# Patient Record
Sex: FEMALE | Race: WHITE | NOT HISPANIC OR LATINO | Employment: OTHER | URBAN - METROPOLITAN AREA
[De-identification: names, ages, dates, MRNs, and addresses within clinical notes are randomized per-mention and may not be internally consistent; named-entity substitution may affect disease eponyms.]

---

## 2017-06-23 ENCOUNTER — OFFICE VISIT (OUTPATIENT)
Dept: UROGYNECOLOGY | Facility: CLINIC | Age: 72
End: 2017-06-23
Payer: MEDICARE

## 2017-06-23 VITALS
HEIGHT: 66 IN | BODY MASS INDEX: 31.5 KG/M2 | WEIGHT: 196 LBS | HEART RATE: 74 BPM | TEMPERATURE: 99 F | DIASTOLIC BLOOD PRESSURE: 79 MMHG | SYSTOLIC BLOOD PRESSURE: 129 MMHG

## 2017-06-23 DIAGNOSIS — N99.3 VAGINAL VAULT PROLAPSE, POSTHYSTERECTOMY: ICD-10-CM

## 2017-06-23 DIAGNOSIS — N81.10 PROLAPSE OF ANTERIOR VAGINAL WALL: Primary | ICD-10-CM

## 2017-06-23 PROCEDURE — 99203 OFFICE O/P NEW LOW 30 MIN: CPT | Mod: PBBFAC,PO | Performed by: OBSTETRICS & GYNECOLOGY

## 2017-06-23 PROCEDURE — 99204 OFFICE O/P NEW MOD 45 MIN: CPT | Mod: S$PBB,,, | Performed by: OBSTETRICS & GYNECOLOGY

## 2017-06-23 PROCEDURE — 1159F MED LIST DOCD IN RCRD: CPT | Mod: ,,, | Performed by: OBSTETRICS & GYNECOLOGY

## 2017-06-23 PROCEDURE — 1126F AMNT PAIN NOTED NONE PRSNT: CPT | Mod: ,,, | Performed by: OBSTETRICS & GYNECOLOGY

## 2017-06-23 PROCEDURE — 99999 PR PBB SHADOW E&M-NEW PATIENT-LVL III: CPT | Mod: PBBFAC,,, | Performed by: OBSTETRICS & GYNECOLOGY

## 2017-06-23 RX ORDER — CONJUGATED ESTROGENS 0.62 MG/G
CREAM VAGINAL
COMMUNITY
Start: 2017-06-06 | End: 2019-09-05 | Stop reason: SDUPTHER

## 2017-06-23 RX ORDER — SIMVASTATIN 20 MG/1
TABLET, FILM COATED ORAL
COMMUNITY
Start: 2017-05-23 | End: 2017-07-28 | Stop reason: DRUGHIGH

## 2017-06-23 NOTE — LETTER
July 5, 2017      Woody Perrin III, MD  [address] [place] [address]  2nd Floor, [facility]  [place] [address]           [facility] - Urogynecology  [address], [address]  [facility] [address]  Phone: [phone]  Fax: [phone]          Patient: Josie Allen   MR Number: [id number]   YOB: 1945   Date of Visit: 6/23/2017       Dear Dr. Woody Perrin III:    Thank you for referring Josie Allen to me for evaluation. Attached you will find relevant portions of my assessment and plan of care.    If you have questions, please do not hesitate to call me. I look forward to following Josie Allen along with you.    Sincerely,    Bradly Perez  CC:  No Recipients    If you would like to receive this communication electronically, please contact [email or web address] or [phone] to request more information on [facility] access.    For providers and/or their staff who would like to refer a patient to [facility], please contact us through our one-stop-shop provider referral line, [facility], at [phone].    If you feel you have received this communication in error or would no longer like to receive these types of communications, please e-mail [email or web address]

## 2017-06-23 NOTE — PROGRESS NOTES
Subjective:       Patient ID: Josie Allen is a 71 y.o. female.    Chief Complaint:  Consult (Cystocele )      History of Present Illness: 71 Y O F with P referred by Dr. Perrin in [place], [place] for evaluation of cystocele.  She has a history of uterine prolpase which was repaired in the 1980's.  She reports bothersome  vaginal pressure and bulge for the past several years worsening since April.   HPI   [facility] Peq Urogyn Hpi     Question 6/23/2017  1:33 PM CDT   General Urogynecology: Are you experiencing the following?    Dysuria (painful urination) No   Nocturia:  waking up at night to empty your bladder  Yes   If you answered yes to the previous question, how many times does this happen per night? 1-2   Enuresis (urine loss during sleep) No   Dribbling urine after you urinate No   Hematuria (urine appears red) No   Type of stream Strong   Urinary Incontinence (General): Are you experiencing the following?    Past consultation for incontinence: Have you ever seen someone for the evaluation of incontinence? No   If you answered yes to the previous question, please select all the therapies you have tried.  Kegals   Please note the effectiveness of the therapies. Somewhat effective   Need to wear protection to keep clothes dry  No   If you answered yes to the previous question, please debbie the protection you use.  N/a- I answered no to the previous question    Panty liner   If you wear protection, how much wetness is typically on each pad? N/a- I do not wear protection   If you wear protection, how often do you have to change per day, if applicable?  0   Stress Symptoms: Are you experiencing the following?    Leakage of urine with cough, laugh and/or sneeze Yes   If you answered yes to the previous question, what is the frequency in days, weeks and/or months? Monthly   Leakage of urine with sex No   Leakage of urine with bending/ lifting No   Leakage of urine with briskly walking or jogging No   If you lose urine  "for any other reason not previously mentioned, please note it below, if applicable.     Urge Symptoms: Are you experiencing the following?    Urgency ("got to go" feeling) Yes   Urge: How frequently do you feel an urge to urinate (feeling like you "gotta go" to the bathroom and can't wait) Several times a day   Do you experience a leakage of urine when you have a feeling of urgency?  Yes   Leakage of urine when unaware Yes   Past use of anticholinergics (medications used to treat overactive bladder) No   If you answered yes to the previous question, please debbie the anticholinergics you have used:     Have you ever used Mirbetriq (aka Mirabegron)?  No   Prolapse Symptoms: Are you experiencing any of the following?     Falling out/ Bulging/ Heaviness in the vagina No   Vaginal/ Abdominal Pain/ Pressure No   Need to strain/ Push to void No   Need to wait on the toilet before you void Yes   Unusual position to urinate (using your hands to push back the vaginal bulge) No   Sensation of incomplete emptying Yes   Past use of pessary device No   If you answered yes to the previous question, please list the devices you have used below.     Bowel Symptoms: Are you experiencing any of the following?    Constipation Yes   Diarrhea  No   Hematochezia (bloody stool) No   Incomplete evacuation of stool No   Involuntary loss of formed stool No   Fecal smearing/urgency No   Involuntary loss of gas No   Vaginal Symptoms: Are you experiencing any of the following?     Abnormal vaginal bleeding  No   Vaginal dryness Yes   Sexually active  No   Dyspareunia (painful intercourse) No   Estrogen use  Yes             History          GYN & OB History  No LMP recorded.   Date of Last Pap: No result found    OB History   No data available     Past Medical History:   Diagnosis Date    Hyperlipidemia     IBS (irritable bowel syndrome)      Past Surgical History:   Procedure Laterality Date    HYSTERECTOMY      TONSILLECTOMY       Review of " patient's allergies indicates:   Allergen Reactions    Penicillins     Sulfasalazine        Current Outpatient Prescriptions:     azithromycin (Z-DANTE) 250 MG tablet, , Disp: , Rfl:     PREMARIN vaginal cream, , Disp: , Rfl:     simvastatin (ZOCOR) 20 MG tablet, , Disp: , Rfl:     Review of Systems  Review of Systems   Constitutional: Negative.  Negative for activity change, appetite change, chills, diaphoresis, fatigue, fever and unexpected weight change.   HENT: Negative.    Eyes: Negative.    Respiratory: Negative.  Negative for cough.    Cardiovascular: Negative.    Gastrointestinal: Positive for constipation. Negative for abdominal distention, abdominal pain, anal bleeding, blood in stool, diarrhea, nausea, rectal pain and vomiting.   Endocrine: Negative.    Genitourinary: Positive for difficulty urinating. Negative for decreased urine volume, dyspareunia, dysuria, enuresis, flank pain, frequency, genital sores, hematuria, menstrual problem, pelvic pain, urgency, vaginal bleeding, vaginal discharge and vaginal pain.        Prolapse  + vaginal bulge   +vaginal pressure    Musculoskeletal: Negative for back pain.   Skin: Negative for color change, pallor, rash and wound.   Allergic/Immunologic: Negative for environmental allergies, food allergies and immunocompromised state.   Hematological: Negative for adenopathy. Does not bruise/bleed easily.   Psychiatric/Behavioral: Negative for agitation, behavioral problems, confusion and sleep disturbance.           Objective:     Physical Exam   Constitutional: She is oriented to person, place, and time. She appears well-developed.   HENT:   Head: Normocephalic and atraumatic.   Eyes: Conjunctivae and EOM are normal.   Neck: Normal range of motion. Neck supple.   Cardiovascular: Normal rate, regular rhythm, S1 normal, S2 normal, normal heart sounds and intact distal pulses.    Pulmonary/Chest: Effort normal and breath sounds normal. She exhibits no tenderness.    Abdominal: Soft. Bowel sounds are normal. She exhibits no distension and no mass. There is no hepatosplenomegaly, splenomegaly or hepatomegaly. There is no tenderness. There is no rigidity, no rebound, no guarding and no CVA tenderness. Hernia confirmed negative in the right inguinal area and confirmed negative in the left inguinal area.   Genitourinary: Pelvic exam was performed with patient supine. Rectum normal, vagina normal, skenes normal and bartholins normal. Right labia normal and left labia normal. Urethra exhibits hypermobility. Urethra exhibits no urethral caruncle, no urethral diverticulum and no urethral mass. Right bartholin is not enlarged and not tender. Left bartholin is not enlarged and not tender. Rectal exam shows resting tone normal and active tone normal. Rectal exam shows no external hemorrhoid, no fissure, no tenderness, anal tone normal and no dovetailing. Guaiac negative stool. There is a cystocele and atrophy in the vagina. No foreign body, tenderness, bleeding, unspecified prolapse of vaginal walls, fistula, mesh exposure or lavator tenderness in the vagina. No vaginal discharge found. Right adnexum displays no tenderness. Left adnexum displays no tenderness. Cervix exhibits absence. Uterus is absent.   PVR: 175 ML  Empty cough stress test: Negative.  Kegel: 1/5    POP-Q  Aa: 2 Ba: 2 C: -3   GH: 4 PB: 2 TVL: 8   Ap: -2 Bp: -2 D:                      Musculoskeletal: Normal range of motion.   Lymphadenopathy:     She has no axillary adenopathy.        Right: No inguinal adenopathy present.        Left: No inguinal adenopathy present.   Neurological: She is alert and oriented to person, place, and time. She has normal strength and normal reflexes. Cranial nerves II through XII intact. No cranial nerve deficit.   Skin: Skin is warm, dry and intact.   Psychiatric: She has a normal mood and affect. Her speech is normal and behavior is normal. Judgment normal. Cognition and memory are normal.           Assessment:        1. Prolapse of anterior vaginal wall    2. Vaginal vault prolapse, posthysterectomy               Plan:           1. Prolapse:  Stage 2 anterior  vaginal wall prolapse   --Pessary  Discussed, will proceed with a pessary placement with NP   --Daily pelvic floor exercises as assigned, consider Pelvic floor PT in future  --Non surgical options discussed with patient    --Surgical options discussed such as anterior/ posterior colporrhaphy, SSF and  USLS. Risks/ benefits/ alternatives/ succuss and failure rates were reviewed. For now will continue with pessary placement      2.   Incomplete bladder emptying:  --Double void and Crede maneuvers discussed  --Repeat PVR at the next visit      Approximately  50 min were spent in consult, 90 % in discussion.     Torey Chavez DO  Female Pelvic Medicine and Reconstructive Surgery  [facility] [place], [place]

## 2017-06-28 ENCOUNTER — OFFICE VISIT (OUTPATIENT)
Dept: UROGYNECOLOGY | Facility: CLINIC | Age: 72
End: 2017-06-28
Payer: MEDICARE

## 2017-06-28 VITALS
WEIGHT: 199.5 LBS | RESPIRATION RATE: 18 BRPM | TEMPERATURE: 98 F | SYSTOLIC BLOOD PRESSURE: 137 MMHG | HEART RATE: 84 BPM | BODY MASS INDEX: 32.06 KG/M2 | HEIGHT: 66 IN | DIASTOLIC BLOOD PRESSURE: 84 MMHG

## 2017-06-28 DIAGNOSIS — N81.11 MIDLINE CYSTOCELE: Primary | ICD-10-CM

## 2017-06-28 DIAGNOSIS — N95.2 ATROPHIC VAGINITIS: ICD-10-CM

## 2017-06-28 DIAGNOSIS — N39.8 VOIDING DYSFUNCTION: ICD-10-CM

## 2017-06-28 PROCEDURE — 99214 OFFICE O/P EST MOD 30 MIN: CPT | Mod: S$PBB,25,, | Performed by: NURSE PRACTITIONER

## 2017-06-28 PROCEDURE — 1126F AMNT PAIN NOTED NONE PRSNT: CPT | Mod: ,,, | Performed by: NURSE PRACTITIONER

## 2017-06-28 PROCEDURE — 57160 INSERT PESSARY/OTHER DEVICE: CPT | Mod: S$PBB,,, | Performed by: NURSE PRACTITIONER

## 2017-06-28 PROCEDURE — 57160 INSERT PESSARY/OTHER DEVICE: CPT | Mod: PBBFAC,PO | Performed by: NURSE PRACTITIONER

## 2017-06-28 PROCEDURE — 99213 OFFICE O/P EST LOW 20 MIN: CPT | Mod: PBBFAC,PO,25 | Performed by: NURSE PRACTITIONER

## 2017-06-28 PROCEDURE — 99999 PR PBB SHADOW E&M-EST. PATIENT-LVL III: CPT | Mod: PBBFAC,,, | Performed by: NURSE PRACTITIONER

## 2017-06-28 PROCEDURE — 1159F MED LIST DOCD IN RCRD: CPT | Mod: ,,, | Performed by: NURSE PRACTITIONER

## 2017-06-28 RX ORDER — AZITHROMYCIN 250 MG/1
TABLET, FILM COATED ORAL
COMMUNITY
Start: 2017-06-26 | End: 2017-07-28 | Stop reason: ALTCHOICE

## 2017-06-28 NOTE — PROGRESS NOTES
Subjective:       Patient ID: Josie Allen is a 71 y.o. female.    Chief Complaint: Pessary Fitting    HPI  Josie Allen is a 71 y.o. female who presents today for pessary fitting.  She saw Dr. Jim 06/23/17 and would like to try the pessary prior to discussing surgical measures.  She denies any new changes since she was seen by Dr. Jim.  She denies any vaginal bleeding or discharge, but is bothered by the mass effect from the prolapse.  She states she has difficulty voiding at times.   She has problems with constipation.  She has started doing the exercises prescribed by Dr. Jim as well as using the premarin cream.  She denies any other complaints/concerns at this time    Review of Systems   Constitutional: Negative for activity change, fever and unexpected weight change.   HENT: Negative for hearing loss.    Eyes: Negative for visual disturbance.   Respiratory: Negative for shortness of breath and wheezing.    Cardiovascular: Negative for chest pain, palpitations and leg swelling.   Gastrointestinal: Positive for constipation. Negative for abdominal pain and diarrhea.   Genitourinary: Negative for dyspareunia, dysuria, frequency, urgency, vaginal bleeding and vaginal discharge.        Vaginal bulge   Musculoskeletal: Negative for gait problem and neck pain.   Skin: Negative for rash and wound.   Allergic/Immunologic: Negative for immunocompromised state.   Neurological: Negative for tremors, speech difficulty and weakness.   Hematological: Does not bruise/bleed easily.   Psychiatric/Behavioral: Negative for agitation and confusion.       Objective:      Physical Exam   Constitutional: She is oriented to person, place, and time. She appears well-developed and well-nourished. No distress.   HENT:   Head: Normocephalic and atraumatic.   Neck: Neck supple. No thyromegaly present.   Pulmonary/Chest: Effort normal. No respiratory distress.   Abdominal: Soft. There is no tenderness. No  hernia.   Musculoskeletal: Normal range of motion.   Neurological: She is alert and oriented to person, place, and time.   Skin: Skin is warm and dry. No rash noted.   Psychiatric: She has a normal mood and affect. Her behavior is normal.     Pelvic Exam:  V: No lesions. No palpable nodes.   Va: no discharge or bleeding.  Good length.  Dominant midline cystocele Ba=0  Meatus:No caruncle or stenosis  Urethra: Non tender. No suburethral masses. hypermobility  Cx/Cuff: Normal   Uterus: surgically absent  Ad: No mass or tenderness.  Levators :Symmetrical. Normal tone. Non tender.  BL: Non tender  RV: No hemorrhoids.      Assessment:       1. Midline cystocele    2. Atrophic vaginitis    3. Voiding dysfunction          Procedure note-  After betadine irrigation of the vagina, #4 sizer ring pessary was inserted into the vagina.  Pt ambulated in the room and was  Slightly uncomfortable.  The #4 sizer ring pessary was removed and a #3 sizer ring pessary was placed.  Pt again ambulated in the room and fel that the #3 ring pessary was more comfortable.    NP reminded pt that the first 24-48 hours are the most likely time for the pessary to fall out and to monitor the toilet before flushing.  Pt verbalized understanding.      Plan:       Midline cystocele- trial of #3 sizer ring pessary    Atrophic vaginitis- continue with premarin    Voiding dysfunction- see if the pessary helps with he voiding difficulties    RTC 1 day

## 2017-06-29 ENCOUNTER — OFFICE VISIT (OUTPATIENT)
Dept: UROGYNECOLOGY | Facility: CLINIC | Age: 72
End: 2017-06-29
Payer: MEDICARE

## 2017-06-29 VITALS
WEIGHT: 200.38 LBS | TEMPERATURE: 98 F | BODY MASS INDEX: 32.2 KG/M2 | SYSTOLIC BLOOD PRESSURE: 166 MMHG | HEIGHT: 66 IN | HEART RATE: 75 BPM | DIASTOLIC BLOOD PRESSURE: 96 MMHG | RESPIRATION RATE: 18 BRPM

## 2017-06-29 DIAGNOSIS — N95.2 ATROPHIC VAGINITIS: ICD-10-CM

## 2017-06-29 DIAGNOSIS — N39.8 VOIDING DYSFUNCTION: ICD-10-CM

## 2017-06-29 DIAGNOSIS — N81.11 MIDLINE CYSTOCELE: Primary | ICD-10-CM

## 2017-06-29 PROCEDURE — 99999 PR PBB SHADOW E&M-EST. PATIENT-LVL III: CPT | Mod: PBBFAC,,, | Performed by: NURSE PRACTITIONER

## 2017-06-29 PROCEDURE — 99499 UNLISTED E&M SERVICE: CPT | Mod: S$PBB,,, | Performed by: NURSE PRACTITIONER

## 2017-06-29 PROCEDURE — A4562 PESSARY, NON RUBBER,ANY TYPE: HCPCS | Mod: PBBFAC,PO | Performed by: NURSE PRACTITIONER

## 2017-06-29 PROCEDURE — 99213 OFFICE O/P EST LOW 20 MIN: CPT | Mod: PBBFAC,PO | Performed by: NURSE PRACTITIONER

## 2017-06-30 NOTE — PROGRESS NOTES
Subjective:       Patient ID: Josie Allen is a 71 y.o. female.    Chief Complaint: Follow-up pessary    HPI  Josie Allen is a 71 y.o. female who presents today for evaluation of her pessary.  We tried a #3 sizer ring pessary and the pt felt that it worked well for her.  She thought that is was slipping down slightly with a BM, but she was able to push it back up so that it stayed in place.  She feels that it is helping her urination slightly.  She would like to try the permanent pessary.    Review of Systems   Constitutional: Negative for activity change, fever and unexpected weight change.   HENT: Negative for hearing loss.    Eyes: Negative for visual disturbance.   Respiratory: Negative for shortness of breath and wheezing.    Cardiovascular: Negative for chest pain, palpitations and leg swelling.   Gastrointestinal: Negative for abdominal pain, constipation and diarrhea.   Genitourinary: Positive for frequency and urgency. Negative for dyspareunia, dysuria, vaginal bleeding and vaginal discharge.   Musculoskeletal: Negative for gait problem and neck pain.   Skin: Negative for rash and wound.   Allergic/Immunologic: Negative for immunocompromised state.   Neurological: Negative for tremors, speech difficulty and weakness.   Hematological: Does not bruise/bleed easily.   Psychiatric/Behavioral: Negative for agitation and confusion.       Objective:      Physical Exam   Constitutional: She is oriented to person, place, and time. She appears well-developed and well-nourished. No distress.   HENT:   Head: Normocephalic and atraumatic.   Neck: Neck supple. No thyromegaly present.   Pulmonary/Chest: Effort normal. No respiratory distress.   Abdominal: Soft. There is no tenderness. No hernia.   Musculoskeletal: Normal range of motion.   Neurological: She is alert and oriented to person, place, and time.   Skin: Skin is warm and dry. No rash noted.   Psychiatric: She has a normal mood and affect. Her  behavior is normal.     Pelvic Exam:  V: No lesions. No palpable nodes.  Va: no discharge or bleeding.  Good length.  Dominance midline cystocele Ba=0  Meatus:No caruncle or stenosis  Urethra: Non tender. No suburethral masses. hypermobility  Cx/Cuff: Normal   Uterus: surgically absent  Ad: No mass or tenderness.  Levators :Symmetrical. Normal tone. Non tender.  BL: Non tender  RV: No hemorrhoids.      Assessment:       1. Midline cystocele    2. Atrophic vaginitis    3. Voiding dysfunction          Procedure note- #3 sizer ring pessary removed and sent to sterilization.  After betadine irrigation of the vagina, #3 permanent ring pessary was reinserted into the vagina.  Pt ambulated in the room and denies any discomfort.  NP reminded pt that the first 24-48 hours are the most likely time for the pessary to fall out and to monitor the toilet before flushing.  Pt verbalized understanding.      Plan:       Midline cystocele- continue with ring pessary    Atrophic vaginitis- continue with premarin    Voiding dysfunction- we will see if the pessary impacts her urination at all    RTC 4 weeks

## 2017-07-28 ENCOUNTER — OFFICE VISIT (OUTPATIENT)
Dept: UROGYNECOLOGY | Facility: CLINIC | Age: 72
End: 2017-07-28
Payer: MEDICARE

## 2017-07-28 VITALS
BODY MASS INDEX: 32.1 KG/M2 | SYSTOLIC BLOOD PRESSURE: 144 MMHG | WEIGHT: 199.75 LBS | HEIGHT: 66 IN | DIASTOLIC BLOOD PRESSURE: 86 MMHG

## 2017-07-28 DIAGNOSIS — N81.10 PROLAPSE OF ANTERIOR VAGINAL WALL: ICD-10-CM

## 2017-07-28 DIAGNOSIS — N99.3 VAGINAL VAULT PROLAPSE, POSTHYSTERECTOMY: Primary | ICD-10-CM

## 2017-07-28 PROCEDURE — 1159F MED LIST DOCD IN RCRD: CPT | Mod: ,,, | Performed by: OBSTETRICS & GYNECOLOGY

## 2017-07-28 PROCEDURE — 99999 PR PBB SHADOW E&M-EST. PATIENT-LVL III: CPT | Mod: PBBFAC,,, | Performed by: OBSTETRICS & GYNECOLOGY

## 2017-07-28 PROCEDURE — 99213 OFFICE O/P EST LOW 20 MIN: CPT | Mod: S$PBB,,, | Performed by: OBSTETRICS & GYNECOLOGY

## 2017-07-28 PROCEDURE — 99213 OFFICE O/P EST LOW 20 MIN: CPT | Mod: PBBFAC,PO | Performed by: OBSTETRICS & GYNECOLOGY

## 2017-07-28 PROCEDURE — 1126F AMNT PAIN NOTED NONE PRSNT: CPT | Mod: ,,, | Performed by: OBSTETRICS & GYNECOLOGY

## 2017-07-28 RX ORDER — SIMVASTATIN 10 MG/1
TABLET, FILM COATED ORAL
COMMUNITY
Start: 2017-07-06 | End: 2018-04-25 | Stop reason: ALTCHOICE

## 2017-07-28 NOTE — PROGRESS NOTES
Subjective:       Patient ID: Josie Allen is a 71 y.o. female.    Chief Complaint:  Pessary Check      History of Present Illness: 71 Y O F with P referred by Dr. Perrin in [place], [place] for evaluation of cystocele.  She has a history of uterine prolpase which was repaired in the 1980's.  She reports bothersome  vaginal pressure and bulge for the past several years worsening since April.   HPI   [facility] Urogyn [facility]     Question 6/23/2017  1:33 PM CDT   General Urogynecology: Are you experiencing the following?    Dysuria (painful urination) No   Nocturia:  waking up at night to empty your bladder  Yes   If you answered yes to the previous question, how many times does this happen per night? 1-2   Enuresis (urine loss during sleep) No   Dribbling urine after you urinate No   Hematuria (urine appears red) No   Type of stream Strong   Urinary Incontinence (General): Are you experiencing the following?    Past consultation for incontinence: Have you ever seen someone for the evaluation of incontinence? No   If you answered yes to the previous question, please select all the therapies you have tried.  Kegals   Please note the effectiveness of the therapies. Somewhat effective   Need to wear protection to keep clothes dry  No   If you answered yes to the previous question, please debbie the protection you use.  N/a- I answered no to the previous question    Panty liner   If you wear protection, how much wetness is typically on each pad? N/a- I do not wear protection   If you wear protection, how often do you have to change per day, if applicable?  0   Stress Symptoms: Are you experiencing the following?    Leakage of urine with cough, laugh and/or sneeze Yes   If you answered yes to the previous question, what is the frequency in days, weeks and/or months? Monthly   Leakage of urine with sex No   Leakage of urine with bending/ lifting No   Leakage of urine with briskly walking or jogging No   If you lose urine for  "any other reason not previously mentioned, please note it below, if applicable.     Urge Symptoms: Are you experiencing the following?    Urgency ("got to go" feeling) Yes   Urge: How frequently do you feel an urge to urinate (feeling like you "gotta go" to the bathroom and can't wait) Several times a day   Do you experience a leakage of urine when you have a feeling of urgency?  Yes   Leakage of urine when unaware Yes   Past use of anticholinergics (medications used to treat overactive bladder) No   If you answered yes to the previous question, please debbie the anticholinergics you have used:     Have you ever used Mirbetriq (aka Mirabegron)?  No   Prolapse Symptoms: Are you experiencing any of the following?     Falling out/ Bulging/ Heaviness in the vagina No   Vaginal/ Abdominal Pain/ Pressure No   Need to strain/ Push to void No   Need to wait on the toilet before you void Yes   Unusual position to urinate (using your hands to push back the vaginal bulge) No   Sensation of incomplete emptying Yes   Past use of pessary device No   If you answered yes to the previous question, please list the devices you have used below.     Bowel Symptoms: Are you experiencing any of the following?    Constipation Yes   Diarrhea  No   Hematochezia (bloody stool) No   Incomplete evacuation of stool No   Involuntary loss of formed stool No   Fecal smearing/urgency No   Involuntary loss of gas No   Vaginal Symptoms: Are you experiencing any of the following?     Abnormal vaginal bleeding  No   Vaginal dryness Yes   Sexually active  No   Dyspareunia (painful intercourse) No   Estrogen use  Yes             History          GYN & OB History  No LMP recorded. Patient has had a hysterectomy.   Date of Last Pap: No result found    OB History   No data available     Past Medical History:   Diagnosis Date    Hyperlipidemia     IBS (irritable bowel syndrome)      Past Surgical History:   Procedure Laterality Date    HYSTERECTOMY      " TONSILLECTOMY       Review of patient's allergies indicates:   Allergen Reactions    Penicillins     Sulfasalazine        Current Outpatient Prescriptions:     PREMARIN vaginal cream, , Disp: , Rfl:     simvastatin (ZOCOR) 10 MG tablet, , Disp: , Rfl:     Review of Systems  Review of Systems   Constitutional: Negative.  Negative for activity change, appetite change, chills, diaphoresis, fatigue, fever and unexpected weight change.   HENT: Negative.    Eyes: Negative.    Respiratory: Negative.  Negative for cough.    Cardiovascular: Negative.    Gastrointestinal: Positive for constipation. Negative for abdominal distention, abdominal pain, anal bleeding, blood in stool, diarrhea, nausea, rectal pain and vomiting.   Endocrine: Negative.    Genitourinary: Positive for difficulty urinating. Negative for decreased urine volume, dyspareunia, dysuria, enuresis, flank pain, frequency, genital sores, hematuria, menstrual problem, pelvic pain, urgency, vaginal bleeding, vaginal discharge and vaginal pain.        Prolapse  + vaginal bulge   +vaginal pressure    Musculoskeletal: Negative for back pain.   Skin: Negative for color change, pallor, rash and wound.   Allergic/Immunologic: Negative for environmental allergies, food allergies and immunocompromised state.   Hematological: Negative for adenopathy. Does not bruise/bleed easily.   Psychiatric/Behavioral: Negative for agitation, behavioral problems, confusion and sleep disturbance.           Objective:     Physical Exam   Constitutional: She is oriented to person, place, and time. She appears well-developed.   HENT:   Head: Normocephalic and atraumatic.   Eyes: Conjunctivae and EOM are normal.   Neck: Normal range of motion. Neck supple.   Cardiovascular: Normal rate, regular rhythm, S1 normal, S2 normal, normal heart sounds and intact distal pulses.    Pulmonary/Chest: Effort normal and breath sounds normal. She exhibits no tenderness.   Abdominal: Soft. Bowel sounds  are normal. She exhibits no distension and no mass. There is no hepatosplenomegaly, splenomegaly or hepatomegaly. There is no tenderness. There is no rigidity, no rebound, no guarding and no CVA tenderness. Hernia confirmed negative in the right inguinal area and confirmed negative in the left inguinal area.   Genitourinary: Pelvic exam was performed with patient supine. Rectum normal, vagina normal, skenes normal and bartholins normal. Right labia normal and left labia normal. Urethra exhibits hypermobility. Urethra exhibits no urethral caruncle, no urethral diverticulum and no urethral mass. Right bartholin is not enlarged and not tender. Left bartholin is not enlarged and not tender. Rectal exam shows resting tone normal and active tone normal. Rectal exam shows no external hemorrhoid, no fissure, no tenderness, anal tone normal and no dovetailing. Guaiac negative stool. There is a cystocele and atrophy in the vagina. No foreign body, tenderness, bleeding, unspecified prolapse of vaginal walls, fistula, mesh exposure or lavator tenderness in the vagina. No vaginal discharge found. Right adnexum displays no tenderness. Left adnexum displays no tenderness. Cervix exhibits absence. Uterus is absent.   PVR: 100 ML  Empty cough stress test: Negative.  Kegel: 1/5    POP-Q  Aa: 2 Ba: 2 C: -3   GH: 4 PB: 2 TVL: 8   Ap: -2 Bp: -2 D:                      Musculoskeletal: Normal range of motion.   Lymphadenopathy:     She has no axillary adenopathy.        Right: No inguinal adenopathy present.        Left: No inguinal adenopathy present.   Neurological: She is alert and oriented to person, place, and time. She has normal strength and normal reflexes. Cranial nerves II through XII intact. No cranial nerve deficit.   Skin: Skin is warm, dry and intact.   Psychiatric: She has a normal mood and affect. Her speech is normal and behavior is normal. Judgment normal. Cognition and memory are normal.          Assessment:        No  diagnosis found.           Plan:           1. Prolapse:  Stage 2 anterior  vaginal wall prolapse   --Pessary  # 3 ring with support, removed and cleaned and placed again, pessary checks in the office Q 3 months   PESSARY CARE: Remove pessary as frequently as nightly and as infrequently as every 2-3 weeks.  Remove before intercourse.  May need to remove before bowel movements if straining dislodges.   Wash with soap and water (no ).  Replace using small amount of water-based lubricant (like KY jelly) at end being introduced into vagina. Patient uncomfortable with self removal for now.   --Daily pelvic floor exercises as assigned   --Non surgical options discussed with patient    --Surgical options discussed such as anterior/ posterior colporrhaphy, SSF and  USLS. Risks/ benefits/ alternatives/ succuss and failure rates were reviewed. For now will continue with pessary management.      2.   Incomplete bladder emptying: improved   -- continue Double void and Crede maneuvers     Approximately  30 min were spent in consult, 90 % in discussion.     Torey Chavez DO  Female Pelvic Medicine and Reconstructive Surgery  [facility] [place], [place]

## 2017-10-25 ENCOUNTER — OFFICE VISIT (OUTPATIENT)
Dept: UROGYNECOLOGY | Facility: CLINIC | Age: 72
End: 2017-10-25
Payer: MEDICARE

## 2017-10-25 VITALS
WEIGHT: 201.94 LBS | TEMPERATURE: 98 F | DIASTOLIC BLOOD PRESSURE: 96 MMHG | HEIGHT: 66 IN | SYSTOLIC BLOOD PRESSURE: 151 MMHG | BODY MASS INDEX: 32.45 KG/M2 | HEART RATE: 88 BPM

## 2017-10-25 DIAGNOSIS — Z46.89 PESSARY MAINTENANCE: ICD-10-CM

## 2017-10-25 DIAGNOSIS — N81.11 CYSTOCELE, MIDLINE: Primary | ICD-10-CM

## 2017-10-25 DIAGNOSIS — N81.9 VAGINAL VAULT PROLAPSE: ICD-10-CM

## 2017-10-25 DIAGNOSIS — N39.8 VOIDING DYSFUNCTION: ICD-10-CM

## 2017-10-25 LAB
BILIRUB SERPL-MCNC: NORMAL MG/DL
BLOOD URINE, POC: NORMAL
COLOR, POC UA: YELLOW
GLUCOSE UR QL STRIP: NORMAL
KETONES UR QL STRIP: NORMAL
LEUKOCYTE ESTERASE URINE, POC: NORMAL
NITRITE, POC UA: NORMAL
PH, POC UA: 5
PROTEIN, POC: NORMAL
SPECIFIC GRAVITY, POC UA: 1
UROBILINOGEN, POC UA: NORMAL

## 2017-10-25 PROCEDURE — 99213 OFFICE O/P EST LOW 20 MIN: CPT | Mod: PBBFAC,PO | Performed by: NURSE PRACTITIONER

## 2017-10-25 PROCEDURE — 99999 PR PBB SHADOW E&M-EST. PATIENT-LVL III: CPT | Mod: PBBFAC,,, | Performed by: NURSE PRACTITIONER

## 2017-10-25 PROCEDURE — 99213 OFFICE O/P EST LOW 20 MIN: CPT | Mod: S$PBB,,, | Performed by: NURSE PRACTITIONER

## 2017-10-25 PROCEDURE — 81002 URINALYSIS NONAUTO W/O SCOPE: CPT | Mod: PBBFAC,PO | Performed by: NURSE PRACTITIONER

## 2017-10-25 NOTE — PROGRESS NOTES
Subjective:       Patient ID: Josie Allen is a 72 y.o. female.    Chief Complaint: Follow-up pessary    HPI  Josie Allen is a 72 y.o. female who presents today for routine pessary maintenance.  She states that physically she is doing well, but there was a death in the family that has her feeling down.  Her daughter in-law recently passed from breast cancer.  The pt denies any vaginal discharge/ bleeding or bulging around the pessary.  She denies any vaginal complaints.  She has had a few episodes of urgency lately, but has been drinking a lot of water to make sure her system is staying flushed.  She denies any real UUI, but does have some dribbling at times at night.  She also has noticed a slight spraying pattern to her urinary stream.  It is not something that is very bothersome for her she just wanted to make note of it.      Review of Systems   Constitutional: Negative for activity change, fever and unexpected weight change.   HENT: Negative for hearing loss.    Eyes: Negative for visual disturbance.   Respiratory: Negative for shortness of breath and wheezing.    Cardiovascular: Negative for chest pain, palpitations and leg swelling.   Gastrointestinal: Negative for abdominal pain, constipation and diarrhea.   Genitourinary: Positive for frequency and urgency. Negative for dyspareunia, dysuria, vaginal bleeding and vaginal discharge.   Musculoskeletal: Negative for gait problem and neck pain.   Skin: Negative for rash and wound.   Allergic/Immunologic: Negative for immunocompromised state.   Neurological: Negative for tremors, speech difficulty and weakness.   Hematological: Does not bruise/bleed easily.   Psychiatric/Behavioral: Negative for agitation and confusion.       Objective:      Physical Exam   Constitutional: She is oriented to person, place, and time. She appears well-developed and well-nourished. No distress.   HENT:   Head: Normocephalic and atraumatic.   Neck: Neck supple. No  thyromegaly present.   Pulmonary/Chest: Effort normal. No respiratory distress.   Abdominal: Soft. There is no tenderness. No hernia.   Musculoskeletal: Normal range of motion.   Neurological: She is alert and oriented to person, place, and time.   Skin: Skin is warm and dry. No rash noted.   Psychiatric: She has a normal mood and affect. Her behavior is normal.     Pelvic Exam:  V: No lesions. No palpable nodes.   Va: no discharge or bleeding.  Good length dominant cystocele   Meatus:No caruncle or stenosis  Urethra: Non tender. No suburethral masses.  Cx/Cuff: Normal   Uterus: surgically absent  Ad: No mass or tenderness.  Levators :Symmetrical. Normal tone. Non tender.  BL: Non tender  RV: No hemorrhoids.        POPQ  Aa: 2 Ba: 2 C: -3   GH: 4 PB: 2 TVL: 8   Ap: -2 Bp: -2 D:        Assessment:       1. Cystocele, midline    2. Vaginal vault prolapse    3. Voiding dysfunction    4. Pessary maintenance          Procedure note- #3 ring pessary removed and cleaned with soap and water.  After betadine irrigation of the vagina, #3 ring pessary was reinserted into the vagina.  Pt ambulated in the room and denies any discomfort.  NP reminded pt that the first 24-48 hours are the most likely time for the pessary to fall out and to monitor the toilet before flushing.  Pt verbalized understanding.      Plan:       Cystocele, midline- continue with ring pessary    Vaginal vault prolapse- continue with ring pessary    Voiding dysfunction- monitor  -     POCT urine dipstick without microscope    Pessary maintenance- continue with ring pessary    RTC 3 months

## 2018-01-25 ENCOUNTER — OFFICE VISIT (OUTPATIENT)
Dept: UROGYNECOLOGY | Facility: CLINIC | Age: 73
End: 2018-01-25
Payer: MEDICARE

## 2018-01-25 VITALS
DIASTOLIC BLOOD PRESSURE: 99 MMHG | SYSTOLIC BLOOD PRESSURE: 162 MMHG | BODY MASS INDEX: 32.1 KG/M2 | HEART RATE: 77 BPM | TEMPERATURE: 98 F | WEIGHT: 199.75 LBS | HEIGHT: 66 IN

## 2018-01-25 DIAGNOSIS — N39.8 VOIDING DYSFUNCTION: ICD-10-CM

## 2018-01-25 DIAGNOSIS — Z46.89 PESSARY MAINTENANCE: ICD-10-CM

## 2018-01-25 DIAGNOSIS — N81.9 VAGINAL VAULT PROLAPSE: ICD-10-CM

## 2018-01-25 DIAGNOSIS — N81.11 CYSTOCELE, MIDLINE: Primary | ICD-10-CM

## 2018-01-25 LAB
BILIRUB SERPL-MCNC: NEGATIVE MG/DL
BLOOD URINE, POC: NEGATIVE
COLOR, POC UA: NORMAL
GLUCOSE UR QL STRIP: NORMAL
KETONES UR QL STRIP: NEGATIVE
LEUKOCYTE ESTERASE URINE, POC: POSITIVE
NITRITE, POC UA: NEGATIVE
PH, POC UA: 7
PROTEIN, POC: NEGATIVE
SPECIFIC GRAVITY, POC UA: 1
UROBILINOGEN, POC UA: NORMAL

## 2018-01-25 PROCEDURE — 99213 OFFICE O/P EST LOW 20 MIN: CPT | Mod: PBBFAC,PO | Performed by: NURSE PRACTITIONER

## 2018-01-25 PROCEDURE — 99999 PR PBB SHADOW E&M-EST. PATIENT-LVL III: CPT | Mod: PBBFAC,,, | Performed by: NURSE PRACTITIONER

## 2018-01-25 PROCEDURE — 81002 URINALYSIS NONAUTO W/O SCOPE: CPT | Mod: PBBFAC,PO | Performed by: NURSE PRACTITIONER

## 2018-01-25 PROCEDURE — 99213 OFFICE O/P EST LOW 20 MIN: CPT | Mod: S$PBB,,, | Performed by: NURSE PRACTITIONER

## 2018-01-25 NOTE — PROGRESS NOTES
Subjective:       Patient ID: Josie Allen is a 72 y.o. female.    Chief Complaint: Follow-up (3mo Pessary)    HPI  Josie Allen is a 72 y.o. female who presents today for routine pessary maintenance.  She has been using the ring pessary for awhile and feels that it works very well for her.  She denies any vaginal discharge, bleeding or bulging around the pessary.  Towards the end of the 3 months she does feel that she develops a slight odor and is wondering if there is anything in particular for her to do about this.  She denies any urinary complaints at this time.  Overall, she is happy with her status.    Review of Systems   Constitutional: Negative for activity change, fever and unexpected weight change.   HENT: Negative for hearing loss.    Eyes: Negative for visual disturbance.   Respiratory: Negative for shortness of breath and wheezing.    Cardiovascular: Negative for chest pain, palpitations and leg swelling.   Gastrointestinal: Negative for abdominal pain, constipation and diarrhea.   Genitourinary: Negative for dyspareunia, dysuria, frequency, urgency, vaginal bleeding and vaginal discharge.   Musculoskeletal: Negative for gait problem and neck pain.   Skin: Negative for rash and wound.   Allergic/Immunologic: Negative for immunocompromised state.   Neurological: Negative for tremors, speech difficulty and weakness.   Hematological: Does not bruise/bleed easily.   Psychiatric/Behavioral: Negative for agitation and confusion.       Objective:      Physical Exam   Constitutional: She is oriented to person, place, and time. She appears well-developed and well-nourished. No distress.   HENT:   Head: Normocephalic and atraumatic.   Neck: Neck supple. No thyromegaly present.   Pulmonary/Chest: Effort normal. No respiratory distress.   Abdominal: Soft. There is no tenderness. No hernia.   Musculoskeletal: Normal range of motion.   Neurological: She is alert and oriented to person, place, and  time.   Skin: Skin is warm and dry. No rash noted.   Psychiatric: She has a normal mood and affect. Her behavior is normal.     Pelvic Exam:  V: No lesions. No palpable nodes.   Va: no discharge or bleeding.  Good length.  Dominant midline cystocele  Meatus:No caruncle or stenosis  Urethra: Non tender. No suburethral masses.  Cx/Cuff: Normal   Uterus: surgically absent  Ad: No mass or tenderness.  Levators :Symmetrical. Normal tone. Non tender.  BL: Non tender  RV: No hemorrhoids.      POPQ  Aa: 0 Ba: 0 C: -3   GH: 4 PB: 2 TVL: 8   Ap: -2 Bp: -2 D:          Assessment:       1. Cystocele, midline    2. Vaginal vault prolapse    3. Pessary maintenance    4. Voiding dysfunction          Procedure note- #3 ring  pessary removed and cleaned with soap and water.  After betadine irrigation of the vagina, #3 ring pessary was reinserted into the vagina.  Pt ambulated in the room and denies any discomfort.  NP reminded pt that the first 24-48 hours are the most likely time for the pessary to fall out and to monitor the toilet before flushing.  Pt verbalized understanding.      Plan:       Cystocele, midline- continue with ring pessary, she can try trimosan 1 applicatorful at bedtime PRN for the odor.    Vaginal vault prolapse- continue with ring pessary    Pessary maintenance- continue with ring pessary    Voiding dysfunction- monitor  -     POCT urine dipstick without microscope    RTC 3 months

## 2018-04-25 ENCOUNTER — OFFICE VISIT (OUTPATIENT)
Dept: UROGYNECOLOGY | Facility: CLINIC | Age: 73
End: 2018-04-25
Payer: MEDICARE

## 2018-04-25 VITALS
BODY MASS INDEX: 32.17 KG/M2 | HEIGHT: 66 IN | DIASTOLIC BLOOD PRESSURE: 97 MMHG | WEIGHT: 200.19 LBS | HEART RATE: 81 BPM | SYSTOLIC BLOOD PRESSURE: 147 MMHG

## 2018-04-25 DIAGNOSIS — Z46.89 PESSARY MAINTENANCE: ICD-10-CM

## 2018-04-25 DIAGNOSIS — N76.0 VAGINOSIS: ICD-10-CM

## 2018-04-25 DIAGNOSIS — N39.8 VOIDING DYSFUNCTION: Primary | ICD-10-CM

## 2018-04-25 DIAGNOSIS — N81.9 VAGINAL VAULT PROLAPSE: ICD-10-CM

## 2018-04-25 DIAGNOSIS — N81.11 CYSTOCELE, MIDLINE: ICD-10-CM

## 2018-04-25 DIAGNOSIS — B35.4 TINEA CORPORIS: ICD-10-CM

## 2018-04-25 LAB
BILIRUB SERPL-MCNC: ABNORMAL MG/DL
BLOOD URINE, POC: ABNORMAL
COLOR, POC UA: YELLOW
GLUCOSE UR QL STRIP: ABNORMAL
KETONES UR QL STRIP: ABNORMAL
LEUKOCYTE ESTERASE URINE, POC: ABNORMAL
NITRITE, POC UA: ABNORMAL
PH, POC UA: 6
PROTEIN, POC: ABNORMAL
SPECIFIC GRAVITY, POC UA: 1.01
UROBILINOGEN, POC UA: ABNORMAL

## 2018-04-25 PROCEDURE — 87510 GARDNER VAG DNA DIR PROBE: CPT

## 2018-04-25 PROCEDURE — 99213 OFFICE O/P EST LOW 20 MIN: CPT | Mod: PBBFAC,PO | Performed by: NURSE PRACTITIONER

## 2018-04-25 PROCEDURE — 87480 CANDIDA DNA DIR PROBE: CPT

## 2018-04-25 PROCEDURE — 81002 URINALYSIS NONAUTO W/O SCOPE: CPT | Mod: PBBFAC,PO | Performed by: NURSE PRACTITIONER

## 2018-04-25 PROCEDURE — 99999 PR PBB SHADOW E&M-EST. PATIENT-LVL III: CPT | Mod: PBBFAC,,, | Performed by: NURSE PRACTITIONER

## 2018-04-25 PROCEDURE — 99213 OFFICE O/P EST LOW 20 MIN: CPT | Mod: S$PBB,,, | Performed by: NURSE PRACTITIONER

## 2018-04-25 RX ORDER — NYSTATIN 100000 [USP'U]/G
POWDER TOPICAL 4 TIMES DAILY PRN
Qty: 60 G | Refills: 2 | Status: SHIPPED | OUTPATIENT
Start: 2018-04-25 | End: 2018-05-02

## 2018-04-25 RX ORDER — SIMVASTATIN 20 MG/1
TABLET, FILM COATED ORAL
COMMUNITY
Start: 2018-04-19 | End: 2018-10-03

## 2018-04-25 NOTE — PROGRESS NOTES
Subjective:       Patient ID: Josie Allen is a 72 y.o. female.    Chief Complaint: Vaginal Pain (more of a stinging) and Urinary Frequency    HPI  Josie Allen is a 72 y.o. female who presents today for routine pessary maintenance.  She has been having some irritation in the vaginal area for about a week.  She just feels as if there is some general discomfort.  She denies any bleeding or discharge.  She has switched to soap with no scents and ivory when bathing.  She is also having some irritation under her left breast.  She has some urinary frequency/urgency at times, but it is not enough that she want to take any medication for it.  She denies any other acute complaints/concerns at this time.    Review of Systems   Constitutional: Negative for activity change, fever and unexpected weight change.   HENT: Negative for hearing loss.    Eyes: Negative for visual disturbance.   Respiratory: Negative for shortness of breath and wheezing.    Cardiovascular: Negative for chest pain, palpitations and leg swelling.   Gastrointestinal: Negative for abdominal pain, constipation and diarrhea.   Genitourinary: Positive for frequency and urgency. Negative for dyspareunia, dysuria, vaginal bleeding and vaginal discharge.        Vaginal irritation   Musculoskeletal: Negative for gait problem and neck pain.   Skin: Negative for rash and wound.   Allergic/Immunologic: Negative for immunocompromised state.   Neurological: Negative for tremors, speech difficulty and weakness.   Hematological: Does not bruise/bleed easily.   Psychiatric/Behavioral: Negative for agitation and confusion.       Objective:      Physical Exam   Constitutional: She is oriented to person, place, and time. She appears well-developed and well-nourished. No distress.   HENT:   Head: Normocephalic and atraumatic.   Neck: Neck supple. No thyromegaly present.   Pulmonary/Chest: Effort normal. No respiratory distress.   Abdominal: Soft. There is no  tenderness. No hernia.   Musculoskeletal: Normal range of motion.   Neurological: She is alert and oriented to person, place, and time.   Skin: Skin is warm and dry. No rash noted.   Red irritated petechial area under left breast   Psychiatric: She has a normal mood and affect. Her behavior is normal.     Pelvic Exam:  V: No lesions. No palpable nodes.   Va: small amount of thick clumping white discharge.  No bleeding noted.  Mild irritation of all the vaginal tissue. Dominant midline cystocele Ba=-1  Meatus:No caruncle or stenosis  Urethra: Non tender. No suburethral masses.  Cx/Cuff: Normal   Uterus: surgically absent  Ad: No mass or tenderness.  Levators :Symmetrical. Normal tone. Non tender.  BL: Non tender  RV: No hemorrhoids.      Assessment:       1. Voiding dysfunction    2. Cystocele, midline    3. Vaginal vault prolapse    4. Pessary maintenance    5. Tinea corporis    6. Vaginosis          Procedure note- #3 ring pessary removed and cleaned with soap and water.  After betadine irrigation of the vagina, #3 ring pessary was reinserted into the vagina.  Pt ambulated in the room and denies any discomfort.  NP reminded pt that the first 24-48 hours are the most likely time for the pessary to fall out and to monitor the toilet before flushing.  Pt verbalized understanding.      Plan:       Voiding dysfunction- pt is having some frequency and urgency at this time, but does not wish to try any medication  -     POCT URINE DIPSTICK WITHOUT MICROSCOPE    Cystocele, midline- continue with ring pessary    Vaginal vault prolapse- continue with ring pessary    Pessary maintenance- continue with ring pessary    Tinea corporis- as noted below  -     nystatin (MYCOSTATIN) powder; Apply topically 4 (four) times daily as needed.  Dispense: 60 g; Refill: 2    Vaginosis - as noted below.  We will wait until the swab comes back before beginning mediation  -     Vaginosis Screen by DNA Probe    RTC 3 months PRN

## 2018-04-26 LAB
CANDIDA RRNA VAG QL PROBE: POSITIVE
G VAGINALIS RRNA GENITAL QL PROBE: POSITIVE
T VAGINALIS RRNA GENITAL QL PROBE: NEGATIVE

## 2018-04-26 RX ORDER — METRONIDAZOLE 500 MG/1
500 TABLET ORAL EVERY 12 HOURS
Qty: 14 TABLET | Refills: 0 | Status: SHIPPED | OUTPATIENT
Start: 2018-04-26 | End: 2018-05-03

## 2018-04-26 RX ORDER — FLUCONAZOLE 150 MG/1
150 TABLET ORAL DAILY
Qty: 2 TABLET | Refills: 1 | Status: SHIPPED | OUTPATIENT
Start: 2018-04-26 | End: 2018-04-27

## 2018-07-25 ENCOUNTER — OFFICE VISIT (OUTPATIENT)
Dept: UROGYNECOLOGY | Facility: CLINIC | Age: 73
End: 2018-07-25
Payer: MEDICARE

## 2018-07-25 VITALS
DIASTOLIC BLOOD PRESSURE: 86 MMHG | BODY MASS INDEX: 32.17 KG/M2 | HEIGHT: 66 IN | SYSTOLIC BLOOD PRESSURE: 128 MMHG | WEIGHT: 200.19 LBS | HEART RATE: 79 BPM

## 2018-07-25 DIAGNOSIS — N81.11 CYSTOCELE, MIDLINE: Primary | ICD-10-CM

## 2018-07-25 DIAGNOSIS — N39.8 VOIDING DYSFUNCTION: ICD-10-CM

## 2018-07-25 DIAGNOSIS — Z46.89 PESSARY MAINTENANCE: ICD-10-CM

## 2018-07-25 DIAGNOSIS — N81.9 VAGINAL VAULT PROLAPSE: ICD-10-CM

## 2018-07-25 LAB
BILIRUB SERPL-MCNC: ABNORMAL MG/DL
BLOOD URINE, POC: ABNORMAL
COLOR, POC UA: ABNORMAL
GLUCOSE UR QL STRIP: ABNORMAL
KETONES UR QL STRIP: ABNORMAL
LEUKOCYTE ESTERASE URINE, POC: ABNORMAL
NITRITE, POC UA: ABNORMAL
PH, POC UA: 6
PROTEIN, POC: ABNORMAL
SPECIFIC GRAVITY, POC UA: 1.01
UROBILINOGEN, POC UA: ABNORMAL

## 2018-07-25 PROCEDURE — 99213 OFFICE O/P EST LOW 20 MIN: CPT | Mod: S$PBB,,, | Performed by: NURSE PRACTITIONER

## 2018-07-25 PROCEDURE — 99999 PR PBB SHADOW E&M-EST. PATIENT-LVL III: CPT | Mod: PBBFAC,,, | Performed by: NURSE PRACTITIONER

## 2018-07-25 PROCEDURE — 81002 URINALYSIS NONAUTO W/O SCOPE: CPT | Mod: PBBFAC,PO | Performed by: NURSE PRACTITIONER

## 2018-07-25 PROCEDURE — 99213 OFFICE O/P EST LOW 20 MIN: CPT | Mod: PBBFAC,PO | Performed by: NURSE PRACTITIONER

## 2018-07-25 RX ORDER — CYCLOBENZAPRINE HCL 5 MG
TABLET ORAL
COMMUNITY
Start: 2018-06-11 | End: 2018-10-03

## 2018-07-25 NOTE — PROGRESS NOTES
Subjective:       Patient ID: Josie Allen is a 72 y.o. female.    Chief Complaint: Pessary Check    HPI  Jsoie Allen is a 72 y.o. female who presents today for routine pessary maintenance.  She has been using the ring pessary for awhile and feels that it works well for her.  She does feel that she starts having some vaginal irritation when it gets close to time for the pessary to be cleaned.  She has been using the trimosan gel sporadically, but will attempt to use it more routinely.  She denies any other vaginal complaints/concerns.  She denies any real urinary changes.  She does have some frequency and urgency, but it is not enough that she wants to try any medication.  She denies any other acute complaints/concerns.    Review of Systems   Constitutional: Negative for activity change, fever and unexpected weight change.   HENT: Negative for hearing loss.    Eyes: Negative for visual disturbance.   Respiratory: Negative for shortness of breath and wheezing.    Cardiovascular: Negative for chest pain, palpitations and leg swelling.   Gastrointestinal: Negative for abdominal pain, constipation and diarrhea.   Genitourinary: Positive for frequency and urgency. Negative for dyspareunia, dysuria, vaginal bleeding and vaginal discharge.   Musculoskeletal: Negative for gait problem and neck pain.   Skin: Negative for rash and wound.   Allergic/Immunologic: Negative for immunocompromised state.   Neurological: Negative for tremors, speech difficulty and weakness.   Hematological: Does not bruise/bleed easily.   Psychiatric/Behavioral: Negative for agitation and confusion.       Objective:      Physical Exam   Constitutional: She is oriented to person, place, and time. She appears well-developed and well-nourished. No distress.   HENT:   Head: Normocephalic and atraumatic.   Neck: Neck supple. No thyromegaly present.   Pulmonary/Chest: Effort normal. No respiratory distress.   Abdominal: Soft. There is no  tenderness. No hernia.   Musculoskeletal: Normal range of motion.   Neurological: She is alert and oriented to person, place, and time.   Skin: Skin is warm and dry. No rash noted.   Psychiatric: She has a normal mood and affect. Her behavior is normal.     Pelvic Exam:  V: No lesions. No palpable nodes.   Va: no discharge or bleeding.  Mild generalized irritation noted.  Good length.  Dominant midline cystocele Ba=0, vaginal vault prolapse=-2  Meatus:No caruncle or stenosis  Urethra: Non tender. No suburethral masses.  Cx/Cuff: Normal   Uterus: surgically absent  Ad: No mass or tenderness.  Levators :Symmetrical. Normal tone. Non tender.  BL: Non tender  RV: No hemorrhoids.      Assessment:       1. Cystocele, midline    2. Voiding dysfunction    3. Vaginal vault prolapse    4. Pessary maintenance          Procedure note- #3 ring pessary removed and cleaned with soap and water.  After betadine irrigation of the vagina, #3 ring pessary was reinserted into the vagina.  Pt ambulated in the room and denies any discomfort.  NP reminded pt that the first 24-48 hours are the most likely time for the pessary to fall out and to monitor the toilet before flushing.  Pt verbalized understanding.      Plan:       Cystocele, midline- continue with ring pessary, consider using trimosan gel more routinely    Voiding dysfunction- monitor  -     POCT urine dipstick without microscope    Vaginal vault prolapse- continue with ring pessary    Pessary maintenance- continue with ring pessary    RTC 10 weeks

## 2018-10-03 ENCOUNTER — OFFICE VISIT (OUTPATIENT)
Dept: UROGYNECOLOGY | Facility: CLINIC | Age: 73
End: 2018-10-03
Payer: MEDICARE

## 2018-10-03 VITALS
SYSTOLIC BLOOD PRESSURE: 185 MMHG | BODY MASS INDEX: 32.38 KG/M2 | DIASTOLIC BLOOD PRESSURE: 98 MMHG | HEART RATE: 73 BPM | WEIGHT: 201.5 LBS | HEIGHT: 66 IN

## 2018-10-03 DIAGNOSIS — N81.9 VAGINAL VAULT PROLAPSE: ICD-10-CM

## 2018-10-03 DIAGNOSIS — N81.11 CYSTOCELE, MIDLINE: ICD-10-CM

## 2018-10-03 DIAGNOSIS — N39.8 VOIDING DYSFUNCTION: Primary | ICD-10-CM

## 2018-10-03 DIAGNOSIS — N95.2 ATROPHIC VAGINITIS: ICD-10-CM

## 2018-10-03 DIAGNOSIS — Z46.89 PESSARY MAINTENANCE: ICD-10-CM

## 2018-10-03 DIAGNOSIS — R51.9 RIGHT FACIAL PAIN: ICD-10-CM

## 2018-10-03 LAB
BILIRUB SERPL-MCNC: NEGATIVE MG/DL
BLOOD URINE, POC: NEGATIVE
COLOR, POC UA: ABNORMAL
GLUCOSE UR QL STRIP: NORMAL
KETONES UR QL STRIP: NEGATIVE
LEUKOCYTE ESTERASE URINE, POC: NEGATIVE
NITRITE, POC UA: NEGATIVE
PH, POC UA: 5
PROTEIN, POC: NEGATIVE
SPECIFIC GRAVITY, POC UA: 1
UROBILINOGEN, POC UA: NORMAL

## 2018-10-03 PROCEDURE — 99999 PR PBB SHADOW E&M-EST. PATIENT-LVL III: CPT | Mod: PBBFAC,,, | Performed by: NURSE PRACTITIONER

## 2018-10-03 PROCEDURE — 99213 OFFICE O/P EST LOW 20 MIN: CPT | Mod: S$PBB,,, | Performed by: NURSE PRACTITIONER

## 2018-10-03 PROCEDURE — 81002 URINALYSIS NONAUTO W/O SCOPE: CPT | Mod: PBBFAC,PO | Performed by: NURSE PRACTITIONER

## 2018-10-03 PROCEDURE — 99213 OFFICE O/P EST LOW 20 MIN: CPT | Mod: PBBFAC,PO | Performed by: NURSE PRACTITIONER

## 2018-10-03 NOTE — PROGRESS NOTES
Subjective:       Patient ID: Josie Allen is a 73 y.o. female.    Chief Complaint: pessary    HPI  Josie Allen is a 73 y.o. female who presents today for routine pessary maintenance.  She has been using the ring pessary for awhile and feels that it works well for her.  She denies any vaginal discharge or bleeding.  She has noticed some irritation on the right side lately.  She has not been using her premarin cream regularly and does feel that she is dry.  She has some mild urinary hesitancy, but denies any real frequency or urgency at this time.  She denies any incontinence or any feeling of PVF.  At this time, her biggest concern in her right cheek facial pain.  She has been having this for several months.  She has seen a dentist and had oral surgery and this did not ease the pain.  She has seen ENT and they have told her nothing is wrong with her sinuses.  She is changing pillows at night trying to get comfortable and feels that in the morning she is waking up with a slight droop on the right side.  She will go back to her PCP, but is wondering if she should see neurology.    Review of Systems   Constitutional: Negative for activity change, fever and unexpected weight change.   HENT: Negative for hearing loss.         Right facial pain radiating to the top of her scalp   Eyes: Negative for visual disturbance.   Respiratory: Negative for shortness of breath and wheezing.    Cardiovascular: Negative for chest pain, palpitations and leg swelling.   Gastrointestinal: Negative for abdominal pain, constipation and diarrhea.   Genitourinary: Negative for dyspareunia, dysuria, frequency, urgency, vaginal bleeding and vaginal discharge.        Vaginal irritation   Musculoskeletal: Negative for gait problem and neck pain.   Skin: Negative for rash and wound.   Allergic/Immunologic: Negative for immunocompromised state.   Neurological: Negative for tremors, speech difficulty and weakness.   Hematological:  Does not bruise/bleed easily.   Psychiatric/Behavioral: Negative for agitation and confusion.       Objective:      Physical Exam   Constitutional: She is oriented to person, place, and time. She appears well-developed and well-nourished. No distress.   HENT:   Head: Normocephalic and atraumatic.   Neck: Neck supple. No thyromegaly present.   Pulmonary/Chest: Effort normal. No respiratory distress.   Abdominal: Soft. There is no tenderness. No hernia.   Musculoskeletal: Normal range of motion.   Neurological: She is alert and oriented to person, place, and time.   No acute right sided droop noted at this time.   Skin: Skin is warm and dry. No rash noted.   Psychiatric: She has a normal mood and affect. Her behavior is normal.     Pelvic Exam:  V: No lesions. No palpable nodes.   Va: no discharge or bleeding.  Mild irritation noted mostly on the right side.  Good length.  Dominant midline cystocele Ba=-1  Meatus:No caruncle or stenosis  Urethra: Non tender. No suburethral masses.  Cx/Cuff: Normal   Uterus: surgically absent  Ad: No mass or tenderness.  Levators :Symmetrical. Normal tone. Non tender.  BL: Non tender  RV: No hemorrhoids.      Assessment:       1. Voiding dysfunction    2. Cystocele, midline    3. Vaginal vault prolapse    4. Pessary maintenance    5. Right facial pain    6. Atrophic vaginitis          Procedure note- #3 ring pessary removed with forceps and cleaned with soap and water.  After betadine irrigation of the vagina, #3 ring pessary was reinserted into the vagina.  Pt ambulated in the room and denies any discomfort.  NP reminded pt that the first 24-48 hours are the most likely time for the pessary to fall out and to monitor the toilet before flushing.  Pt verbalized understanding.      Plan:       Voiding dysfunction- monitor at this time.  -     POCT urine dipstick without microscope    Cystocele, midline- continue with ring pessary    Vaginal vault prolapse- continue with ring  pessary    Pessary maintenance- continue with ring pessary    Right facial pain- encouraged pt to follow up with PCP, but to consider neurology consult in regards to facial droop.    Atrophic vaginitis- continue with premarin cream but use this 3 times a week.    RTC 10 weeks for pessary

## 2018-12-13 ENCOUNTER — OFFICE VISIT (OUTPATIENT)
Dept: UROGYNECOLOGY | Facility: CLINIC | Age: 73
End: 2018-12-13
Payer: MEDICARE

## 2018-12-13 VITALS
WEIGHT: 198 LBS | HEIGHT: 66 IN | DIASTOLIC BLOOD PRESSURE: 85 MMHG | BODY MASS INDEX: 31.82 KG/M2 | TEMPERATURE: 98 F | SYSTOLIC BLOOD PRESSURE: 137 MMHG | HEART RATE: 81 BPM

## 2018-12-13 DIAGNOSIS — N81.11 CYSTOCELE, MIDLINE: ICD-10-CM

## 2018-12-13 DIAGNOSIS — N39.8 VOIDING DYSFUNCTION: Primary | ICD-10-CM

## 2018-12-13 DIAGNOSIS — Z46.89 PESSARY MAINTENANCE: ICD-10-CM

## 2018-12-13 DIAGNOSIS — N95.2 ATROPHIC VAGINITIS: ICD-10-CM

## 2018-12-13 DIAGNOSIS — N81.9 VAGINAL VAULT PROLAPSE: ICD-10-CM

## 2018-12-13 LAB
BILIRUB SERPL-MCNC: ABNORMAL MG/DL
BLOOD URINE, POC: ABNORMAL
COLOR, POC UA: ABNORMAL
GLUCOSE UR QL STRIP: ABNORMAL
KETONES UR QL STRIP: ABNORMAL
LEUKOCYTE ESTERASE URINE, POC: 5
NITRITE, POC UA: ABNORMAL
PH, POC UA: 1
PROTEIN, POC: ABNORMAL
SPECIFIC GRAVITY, POC UA: 1
UROBILINOGEN, POC UA: ABNORMAL

## 2018-12-13 PROCEDURE — 99213 OFFICE O/P EST LOW 20 MIN: CPT | Mod: PBBFAC,PO | Performed by: NURSE PRACTITIONER

## 2018-12-13 PROCEDURE — 81002 URINALYSIS NONAUTO W/O SCOPE: CPT | Mod: PBBFAC,PO | Performed by: NURSE PRACTITIONER

## 2018-12-13 PROCEDURE — 99213 OFFICE O/P EST LOW 20 MIN: CPT | Mod: S$PBB,,, | Performed by: NURSE PRACTITIONER

## 2018-12-13 PROCEDURE — 99999 PR PBB SHADOW E&M-EST. PATIENT-LVL III: CPT | Mod: PBBFAC,,, | Performed by: NURSE PRACTITIONER

## 2018-12-13 RX ORDER — SIMVASTATIN 10 MG/1
TABLET, FILM COATED ORAL
COMMUNITY
Start: 2017-07-06 | End: 2019-09-05

## 2018-12-13 RX ORDER — ROSUVASTATIN CALCIUM 5 MG/1
5 TABLET, COATED ORAL
COMMUNITY
Start: 2014-02-04 | End: 2019-03-06

## 2018-12-13 RX ORDER — NYSTATIN 100000 [USP'U]/G
POWDER TOPICAL
COMMUNITY
Start: 2018-10-19 | End: 2021-07-06 | Stop reason: SDUPTHER

## 2018-12-13 RX ORDER — CYCLOBENZAPRINE HCL 5 MG
5 TABLET ORAL
COMMUNITY
Start: 2018-10-30 | End: 2019-03-06 | Stop reason: SDUPTHER

## 2018-12-13 RX ORDER — CYCLOBENZAPRINE HCL 5 MG
TABLET ORAL
COMMUNITY
Start: 2018-12-04 | End: 2022-08-01

## 2018-12-13 NOTE — PROGRESS NOTES
Subjective:       Patient ID: Josie Allen is a 73 y.o. female.    Chief Complaint: Pessary Check    HPI  Josie Allen is a 73 y.o. female who presents today for routine pessary maintenance.  She has been using the ring pessary for quite awhile and feels that it works well for her.  She denies any vaginal discharge, bleeding or bulging.  She does feel that there is a slight odor at times, but otherwise denies any complaints.  She denies any urinary complaints at this time as well.  Overall, she is happy with her status and is ready to proceed.    Review of Systems   Constitutional: Negative for activity change, fever and unexpected weight change.   HENT: Negative for hearing loss.    Eyes: Negative for visual disturbance.   Respiratory: Negative for shortness of breath and wheezing.    Cardiovascular: Negative for chest pain, palpitations and leg swelling.   Gastrointestinal: Negative for abdominal pain, constipation and diarrhea.   Genitourinary: Negative for dyspareunia, dysuria, frequency, urgency, vaginal bleeding and vaginal discharge.   Musculoskeletal: Negative for gait problem and neck pain.   Skin: Negative for rash and wound.   Allergic/Immunologic: Negative for immunocompromised state.   Neurological: Negative for tremors, speech difficulty and weakness.   Hematological: Does not bruise/bleed easily.   Psychiatric/Behavioral: Negative for agitation and confusion.       Objective:      Physical Exam   Constitutional: She is oriented to person, place, and time. She appears well-developed and well-nourished. No distress.   HENT:   Head: Normocephalic and atraumatic.   Neck: Neck supple. No thyromegaly present.   Pulmonary/Chest: Effort normal. No respiratory distress.   Abdominal: Soft. There is no tenderness. No hernia.   Musculoskeletal: Normal range of motion.   Neurological: She is alert and oriented to person, place, and time.   Skin: Skin is warm and dry. No rash noted.   Psychiatric:  She has a normal mood and affect. Her behavior is normal.     Pelvic Exam:  V: No lesions. No palpable nodes.   Va: small amount of thin white discharge,  No bleeding noted.  Good length.  Midline cystocele Ba=-1  Meatus:No caruncle or stenosis  Urethra: Non tender. No suburethral masses.  Cx/Cuff: Normal   Uterus: surgically absent  Ad: No mass or tenderness.  Levators :Symmetrical. Normal tone. Non tender.  BL: Non tender  RV: No hemorrhoids.      Assessment:       1. Voiding dysfunction    2. Cystocele, midline    3. Vaginal vault prolapse    4. Pessary maintenance    5. Atrophic vaginitis          Procedure note- #3 ring pessary removed with forceps and cleaned with soap and water.  After betadine irrigation of the vagina, #3 ring pessary was reinserted into the vagina.  Pt ambulated in the room and denies any discomfort.  NP reminded pt that the first 24-48 hours are the most likely time for the pessary to fall out and to monitor the toilet before flushing.  Pt verbalized understanding.      Plan:       Voiding dysfunction- doing well, monitor  -     POCT URINE DIPSTICK WITHOUT MICROSCOPE    Cystocele, midline- doing well with ring pessary, monitor    Vaginal vault prolapse- as noted above    Pessary maintenance- as noted above    Atrophic vaginitis- continue with premarin    RTC 3 months

## 2019-03-06 ENCOUNTER — OFFICE VISIT (OUTPATIENT)
Dept: UROGYNECOLOGY | Facility: CLINIC | Age: 74
End: 2019-03-06
Payer: MEDICARE

## 2019-03-06 VITALS
HEART RATE: 82 BPM | SYSTOLIC BLOOD PRESSURE: 164 MMHG | WEIGHT: 199.06 LBS | DIASTOLIC BLOOD PRESSURE: 76 MMHG | BODY MASS INDEX: 31.99 KG/M2 | HEIGHT: 66 IN

## 2019-03-06 DIAGNOSIS — N95.2 ATROPHIC VAGINITIS: ICD-10-CM

## 2019-03-06 DIAGNOSIS — N39.8 VOIDING DYSFUNCTION: Primary | ICD-10-CM

## 2019-03-06 DIAGNOSIS — Z46.89 PESSARY MAINTENANCE: ICD-10-CM

## 2019-03-06 DIAGNOSIS — N81.9 VAGINAL VAULT PROLAPSE: ICD-10-CM

## 2019-03-06 DIAGNOSIS — N81.11 CYSTOCELE, MIDLINE: ICD-10-CM

## 2019-03-06 LAB
BILIRUB SERPL-MCNC: NORMAL MG/DL
BLOOD URINE, POC: NORMAL
COLOR, POC UA: NORMAL
GLUCOSE UR QL STRIP: NORMAL
KETONES UR QL STRIP: NORMAL
LEUKOCYTE ESTERASE URINE, POC: NORMAL
NITRITE, POC UA: NORMAL
PH, POC UA: 6
PROTEIN, POC: NORMAL
SPECIFIC GRAVITY, POC UA: 1
UROBILINOGEN, POC UA: NORMAL

## 2019-03-06 PROCEDURE — 81002 URINALYSIS NONAUTO W/O SCOPE: CPT | Mod: PBBFAC,PO | Performed by: NURSE PRACTITIONER

## 2019-03-06 PROCEDURE — 99213 PR OFFICE/OUTPT VISIT, EST, LEVL III, 20-29 MIN: ICD-10-PCS | Mod: S$PBB,,, | Performed by: NURSE PRACTITIONER

## 2019-03-06 PROCEDURE — 99213 OFFICE O/P EST LOW 20 MIN: CPT | Mod: PBBFAC,PO | Performed by: NURSE PRACTITIONER

## 2019-03-06 PROCEDURE — 99213 OFFICE O/P EST LOW 20 MIN: CPT | Mod: S$PBB,,, | Performed by: NURSE PRACTITIONER

## 2019-03-06 PROCEDURE — 99999 PR PBB SHADOW E&M-EST. PATIENT-LVL III: CPT | Mod: PBBFAC,,, | Performed by: NURSE PRACTITIONER

## 2019-03-06 PROCEDURE — 99999 PR PBB SHADOW E&M-EST. PATIENT-LVL III: ICD-10-PCS | Mod: PBBFAC,,, | Performed by: NURSE PRACTITIONER

## 2019-03-06 NOTE — PROGRESS NOTES
Subjective:       Patient ID: Josie Allen is a 73 y.o. female.    Chief Complaint: Pessary Check    HPI  Josie Allen is a 73 y.o. female who presents today for routine pessary maintenance.  She has been using the ring pessary for awhile and feels that it works well for her.  She denies any vaginal discharge, bleeding or bulging around the pessary.  She does feel that there is a little irritation, but otherwise is doing good.  Her urinary incontinence is sometimes bothersome, but not enough that she wants to do anything at this point.  She denies any other urinary complaints/concerns.  She is ready to proceed with the cleaning.    Review of Systems   Constitutional: Negative for activity change, fever and unexpected weight change.   HENT: Negative for hearing loss.    Eyes: Negative for visual disturbance.   Respiratory: Negative for shortness of breath and wheezing.    Cardiovascular: Negative for chest pain, palpitations and leg swelling.   Gastrointestinal: Negative for abdominal pain, constipation and diarrhea.   Genitourinary: Positive for frequency. Negative for dyspareunia, dysuria, urgency, vaginal bleeding and vaginal discharge.   Musculoskeletal: Negative for gait problem and neck pain.   Skin: Negative for rash and wound.   Allergic/Immunologic: Negative for immunocompromised state.   Neurological: Negative for tremors, speech difficulty and weakness.   Hematological: Does not bruise/bleed easily.   Psychiatric/Behavioral: Negative for agitation and confusion.       Objective:      Physical Exam   Constitutional: She is oriented to person, place, and time. She appears well-developed and well-nourished. No distress.   HENT:   Head: Normocephalic and atraumatic.   Neck: Neck supple. No thyromegaly present.   Pulmonary/Chest: Effort normal. No respiratory distress.   Abdominal: Soft. There is no tenderness. No hernia.   Musculoskeletal: Normal range of motion.   Neurological: She is alert  and oriented to person, place, and time.   Skin: Skin is warm and dry. No rash noted.   Psychiatric: She has a normal mood and affect. Her behavior is normal.     Pelvic Exam:  V: No lesions. No palpable nodes.   Va: no discharge or bleeding.  Good length.  Dominant midline cystocele Ba=-1,  Vaginal vault prolapse =-3  Meatus:No caruncle or stenosis  Urethra: Non tender. No suburethral masses.  Cx/Cuff: Normal   Uterus: surgically absent  Ad: No mass or tenderness.  Levators :Symmetrical. Normal tone. Non tender.  BL: Non tender  RV: No hemorrhoids.      Assessment:       1. Voiding dysfunction    2. Cystocele, midline    3. Vaginal vault prolapse    4. Pessary maintenance    5. Atrophic vaginitis          Procedure note- #3 ring pessary removed with forceps and cleaned with soap and water.  After betadine irrigation of the vagina, #3 ring pessary was reinserted into the vagina.  Pt ambulated in the room and denies any discomfort.  NP reminded pt that the first 24-48 hours are the most likely time for the pessary to fall out and to monitor the toilet before flushing.  Pt verbalized understanding.      Plan:       Voiding dysfunction- monitor  -     POCT URINE DIPSTICK WITHOUT MICROSCOPE    Cystocele, midline- continue with ring pessary    Vaginal vault prolapse- continue with ring pessary    Pessary maintenance- as noted above    Atrophic vaginitis- monitor    RTC 3 months

## 2019-03-11 ENCOUNTER — TELEPHONE (OUTPATIENT)
Dept: UROGYNECOLOGY | Facility: CLINIC | Age: 74
End: 2019-03-11

## 2019-03-11 NOTE — TELEPHONE ENCOUNTER
Was in last Wednesday, feeling pressure like the pessary is not there, is wanting to come in and be seen to make sure it is there.

## 2019-03-11 NOTE — TELEPHONE ENCOUNTER
----- Message from Jessi Hare sent at 3/11/2019 10:54 AM CDT -----  Contact: self [phone]  Please call her regarding the pessary.  Thank you!

## 2019-03-12 ENCOUNTER — OFFICE VISIT (OUTPATIENT)
Dept: UROGYNECOLOGY | Facility: CLINIC | Age: 74
End: 2019-03-12
Payer: MEDICARE

## 2019-03-12 VITALS
BODY MASS INDEX: 31.85 KG/M2 | WEIGHT: 198.19 LBS | DIASTOLIC BLOOD PRESSURE: 88 MMHG | HEIGHT: 66 IN | HEART RATE: 80 BPM | SYSTOLIC BLOOD PRESSURE: 155 MMHG

## 2019-03-12 DIAGNOSIS — N95.2 ATROPHIC VAGINITIS: ICD-10-CM

## 2019-03-12 DIAGNOSIS — K59.00 CONSTIPATION, UNSPECIFIED CONSTIPATION TYPE: ICD-10-CM

## 2019-03-12 DIAGNOSIS — Z46.89 PESSARY MAINTENANCE: ICD-10-CM

## 2019-03-12 DIAGNOSIS — R35.0 URINARY FREQUENCY: ICD-10-CM

## 2019-03-12 DIAGNOSIS — N81.11 CYSTOCELE, MIDLINE: ICD-10-CM

## 2019-03-12 DIAGNOSIS — N39.8 VOIDING DYSFUNCTION: Primary | ICD-10-CM

## 2019-03-12 DIAGNOSIS — N81.9 VAGINAL VAULT PROLAPSE: ICD-10-CM

## 2019-03-12 LAB
BILIRUB SERPL-MCNC: NORMAL MG/DL
BLOOD URINE, POC: NORMAL
COLOR, POC UA: CLEAR
GLUCOSE UR QL STRIP: NORMAL
KETONES UR QL STRIP: NORMAL
LEUKOCYTE ESTERASE URINE, POC: NORMAL
NITRITE, POC UA: NORMAL
PH, POC UA: 6
PROTEIN, POC: NORMAL
SPECIFIC GRAVITY, POC UA: 1
UROBILINOGEN, POC UA: NORMAL

## 2019-03-12 PROCEDURE — 99999 PR PBB SHADOW E&M-EST. PATIENT-LVL III: CPT | Mod: PBBFAC,,, | Performed by: NURSE PRACTITIONER

## 2019-03-12 PROCEDURE — 99499 NO LOS: ICD-10-PCS | Mod: S$PBB,,, | Performed by: NURSE PRACTITIONER

## 2019-03-12 PROCEDURE — 87086 URINE CULTURE/COLONY COUNT: CPT

## 2019-03-12 PROCEDURE — 99999 PR PBB SHADOW E&M-EST. PATIENT-LVL III: ICD-10-PCS | Mod: PBBFAC,,, | Performed by: NURSE PRACTITIONER

## 2019-03-12 PROCEDURE — 99499 UNLISTED E&M SERVICE: CPT | Mod: S$PBB,,, | Performed by: NURSE PRACTITIONER

## 2019-03-12 PROCEDURE — 99213 OFFICE O/P EST LOW 20 MIN: CPT | Mod: PBBFAC,PO | Performed by: NURSE PRACTITIONER

## 2019-03-12 PROCEDURE — 81002 URINALYSIS NONAUTO W/O SCOPE: CPT | Mod: PBBFAC,PO | Performed by: NURSE PRACTITIONER

## 2019-03-12 NOTE — PROGRESS NOTES
Subjective:       Patient ID: Josie Allen is a 73 y.o. female.    Chief Complaint: lost pessary    HPI  Josie Allen is a 73 y.o. female who presents today for possible lost pessary.  She was seen last week for routine pessary maintenance.  However, on Sat. evening she started to develop some pressure in the lower abdomen and feels as if the pessary might have come out.  She felt that her prolapse was more noticeable.  She also was feeling some dull ache sensation in the lower back.  She does have problems with constipation and has been trying to increase her water intake in the hopes of helping this.  She does eat a fair amount of fiber.  She takes miralax about every third day.  She is not sure if this was contributing to her problems.    Review of Systems   Constitutional: Negative for activity change, fever and unexpected weight change.   HENT: Negative for hearing loss.    Eyes: Negative for visual disturbance.   Respiratory: Negative for shortness of breath and wheezing.    Cardiovascular: Negative for chest pain, palpitations and leg swelling.   Gastrointestinal: Positive for constipation. Negative for abdominal pain and diarrhea.   Genitourinary: Positive for frequency. Negative for dyspareunia, dysuria, urgency, vaginal bleeding and vaginal discharge.        Vaginal bulge   Musculoskeletal: Negative for gait problem and neck pain.   Skin: Negative for rash and wound.   Allergic/Immunologic: Negative for immunocompromised state.   Neurological: Negative for tremors, speech difficulty and weakness.   Hematological: Does not bruise/bleed easily.   Psychiatric/Behavioral: Negative for agitation and confusion.       Objective:      Physical Exam   Constitutional: She is oriented to person, place, and time. She appears well-developed and well-nourished. No distress.   HENT:   Head: Normocephalic and atraumatic.   Neck: Neck supple. No thyromegaly present.   Pulmonary/Chest: Effort normal. No  respiratory distress.   Abdominal: Soft. There is no tenderness. There is no CVA tenderness. No hernia.   Musculoskeletal: Normal range of motion.   Neurological: She is alert and oriented to person, place, and time.   Skin: Skin is warm and dry. No rash noted.   Psychiatric: She has a normal mood and affect. Her behavior is normal.     Pelvic Exam:  V: No lesions. No palpable nodes.   Va: no discharge or bleeding.  Good length.  Pessary was still in place.  Dominant midline cystocele Ba=-1  Meatus:No caruncle or stenosis  Urethra: Non tender. No suburethral masses.  Cx/Cuff: Normal   Uterus: surgically absent  Ad: No mass or tenderness.  Levators :Symmetrical. Normal tone. Non tender.  BL: Non tender  RV: No hemorrhoids.      Assessment:       1. Voiding dysfunction    2. Vaginal vault prolapse    3. Cystocele, midline    4. Pessary maintenance    5. Atrophic vaginitis    6. Urinary frequency    7. Constipation, unspecified constipation type          Procedure note- #3 ring pessary removed and cleaned with soap and water.  After betadine irrigation of the vagina, a new #4 ring pessary was inserted into the vagina.  Pt ambulated in the room and denies any discomfort.  NP reminded pt that the first 24-48 hours are the most likely time for the pessary to fall out and to monitor the toilet before flushing.  Pt verbalized understanding.      Plan:       Voiding dysfunction- monitor  -     POCT URINE DIPSTICK WITHOUT MICROSCOPE    Vaginal vault prolapse- we will try a slight increase in the ring size to a #4 ring    Cystocele, midline- as noted above    Pessary maintenance- as noted above    Atrophic vaginitis- monitor    Urinary frequency- as noted below  -     Urine culture    Constipation, unspecified constipation type- NP discussed balance of fiber and water to help with constipation.    RTC reg. Sched. appt in 3 months

## 2019-03-14 LAB
BACTERIA UR CULT: NORMAL
BACTERIA UR CULT: NORMAL

## 2019-04-10 ENCOUNTER — OFFICE VISIT (OUTPATIENT)
Dept: UROGYNECOLOGY | Facility: CLINIC | Age: 74
End: 2019-04-10
Payer: MEDICARE

## 2019-04-10 VITALS
BODY MASS INDEX: 31.75 KG/M2 | HEIGHT: 66 IN | SYSTOLIC BLOOD PRESSURE: 151 MMHG | WEIGHT: 197.56 LBS | HEART RATE: 83 BPM | DIASTOLIC BLOOD PRESSURE: 95 MMHG

## 2019-04-10 DIAGNOSIS — N81.11 CYSTOCELE, MIDLINE: ICD-10-CM

## 2019-04-10 DIAGNOSIS — N81.9 VAGINAL VAULT PROLAPSE: ICD-10-CM

## 2019-04-10 DIAGNOSIS — L02.224 BOIL OF GROIN: ICD-10-CM

## 2019-04-10 DIAGNOSIS — R35.0 URINARY FREQUENCY: Primary | ICD-10-CM

## 2019-04-10 DIAGNOSIS — N39.8 VOIDING DYSFUNCTION: ICD-10-CM

## 2019-04-10 PROCEDURE — 99213 PR OFFICE/OUTPT VISIT, EST, LEVL III, 20-29 MIN: ICD-10-PCS | Mod: S$PBB,,, | Performed by: NURSE PRACTITIONER

## 2019-04-10 PROCEDURE — 99999 PR PBB SHADOW E&M-EST. PATIENT-LVL III: CPT | Mod: PBBFAC,,, | Performed by: NURSE PRACTITIONER

## 2019-04-10 PROCEDURE — 99213 OFFICE O/P EST LOW 20 MIN: CPT | Mod: S$PBB,,, | Performed by: NURSE PRACTITIONER

## 2019-04-10 PROCEDURE — 99213 OFFICE O/P EST LOW 20 MIN: CPT | Mod: PBBFAC,PO | Performed by: NURSE PRACTITIONER

## 2019-04-10 PROCEDURE — 99999 PR PBB SHADOW E&M-EST. PATIENT-LVL III: ICD-10-PCS | Mod: PBBFAC,,, | Performed by: NURSE PRACTITIONER

## 2019-04-10 PROCEDURE — 81002 URINALYSIS NONAUTO W/O SCOPE: CPT | Mod: PBBFAC,PO | Performed by: NURSE PRACTITIONER

## 2019-04-10 RX ORDER — MUPIROCIN 20 MG/G
OINTMENT TOPICAL 3 TIMES DAILY
Qty: 30 G | Refills: 1 | Status: SHIPPED | OUTPATIENT
Start: 2019-04-10 | End: 2021-07-06 | Stop reason: SDUPTHER

## 2019-04-10 RX ORDER — POLYETHYLENE GLYCOL 3350 17 G/17G
17 POWDER, FOR SOLUTION ORAL
COMMUNITY

## 2019-04-10 RX ORDER — CEPHALEXIN 500 MG/1
500 CAPSULE ORAL EVERY 8 HOURS
Qty: 30 CAPSULE | Refills: 0 | Status: SHIPPED | OUTPATIENT
Start: 2019-04-10 | End: 2019-04-20

## 2019-04-10 NOTE — PROGRESS NOTES
Subjective:       Patient ID: Josie Allen is a 73 y.o. female.    Chief Complaint: lump outside vagina    HPI  Josie Allen is a 73 y.o. female who presents today for a spot in the vaginal area.  She started noticing tenderness on Monday and then tues she felt that the area was very swollen and she saw a few streaks of blood in her underwear.  She was concerned it had something to do with the pessary.  She denies any other discharge besides the blood.  She denies any inner vaginal complaints and feels that the pessary is comfortable.  She is wondering what needs to be done.    Review of Systems   Constitutional: Negative for activity change, fever and unexpected weight change.   HENT: Negative for hearing loss.    Eyes: Negative for visual disturbance.   Respiratory: Negative for shortness of breath and wheezing.    Cardiovascular: Negative for chest pain, palpitations and leg swelling.   Gastrointestinal: Negative for abdominal pain, constipation and diarrhea.   Genitourinary: Negative for dyspareunia, dysuria, frequency, urgency, vaginal bleeding and vaginal discharge.        Vaginal sore   Musculoskeletal: Negative for gait problem and neck pain.   Skin: Negative for rash and wound.   Allergic/Immunologic: Negative for immunocompromised state.   Neurological: Negative for tremors, speech difficulty and weakness.   Hematological: Does not bruise/bleed easily.   Psychiatric/Behavioral: Negative for agitation and confusion.       Objective:      Physical Exam   Genitourinary:         Genitourinary Comments: Left labia and minora with 2cmx 2cm area of redness and tenderness.  Some yellow and bloody discharged expressed.  Small area of induration noted.     Pelvic Exam:  V: as noted above  Va: pessary in place and not a likely contributing factor.        Assessment:       1. Urinary frequency    2. Voiding dysfunction    3. Vaginal vault prolapse    4. Cystocele, midline    5. Boil of groin         Plan:       Urinary frequency- monitor  -     POCT urine dipstick without microscope    Voiding dysfunction- monitor    Vaginal vault prolapse- continue with pessary    Cystocele, midline- continue with pessary    Boil of groin- as noted below  -     cephALEXin (KEFLEX) 500 MG capsule; Take 1 capsule (500 mg total) by mouth every 8 (eight) hours. for 10 days  Dispense: 30 capsule; Refill: 0  -     mupirocin (BACTROBAN) 2 % ointment; Apply topically 3 (three) times daily.  Dispense: 30 g; Refill: 1    RTC 2 weeks

## 2019-04-24 ENCOUNTER — OFFICE VISIT (OUTPATIENT)
Dept: UROGYNECOLOGY | Facility: CLINIC | Age: 74
End: 2019-04-24
Payer: MEDICARE

## 2019-04-24 VITALS
DIASTOLIC BLOOD PRESSURE: 101 MMHG | HEIGHT: 66 IN | SYSTOLIC BLOOD PRESSURE: 175 MMHG | BODY MASS INDEX: 31.75 KG/M2 | WEIGHT: 197.56 LBS | HEART RATE: 79 BPM

## 2019-04-24 DIAGNOSIS — R35.0 URINARY FREQUENCY: Primary | ICD-10-CM

## 2019-04-24 DIAGNOSIS — L02.224 BOIL OF GROIN: ICD-10-CM

## 2019-04-24 LAB
BILIRUB SERPL-MCNC: NEGATIVE MG/DL
BLOOD URINE, POC: NEGATIVE
COLOR, POC UA: ABNORMAL
GLUCOSE UR QL STRIP: NORMAL
KETONES UR QL STRIP: NEGATIVE
LEUKOCYTE ESTERASE URINE, POC: ABNORMAL
NITRITE, POC UA: NEGATIVE
PH, POC UA: 7
PROTEIN, POC: NEGATIVE
SPECIFIC GRAVITY, POC UA: 1
UROBILINOGEN, POC UA: NORMAL

## 2019-04-24 PROCEDURE — 99213 OFFICE O/P EST LOW 20 MIN: CPT | Mod: PBBFAC,PO | Performed by: NURSE PRACTITIONER

## 2019-04-24 PROCEDURE — 81002 URINALYSIS NONAUTO W/O SCOPE: CPT | Mod: PBBFAC,PO | Performed by: NURSE PRACTITIONER

## 2019-04-24 PROCEDURE — 99213 PR OFFICE/OUTPT VISIT, EST, LEVL III, 20-29 MIN: ICD-10-PCS | Mod: S$PBB,,, | Performed by: NURSE PRACTITIONER

## 2019-04-24 PROCEDURE — 99999 PR PBB SHADOW E&M-EST. PATIENT-LVL III: CPT | Mod: PBBFAC,,, | Performed by: NURSE PRACTITIONER

## 2019-04-24 PROCEDURE — 99999 PR PBB SHADOW E&M-EST. PATIENT-LVL III: ICD-10-PCS | Mod: PBBFAC,,, | Performed by: NURSE PRACTITIONER

## 2019-04-24 PROCEDURE — 99213 OFFICE O/P EST LOW 20 MIN: CPT | Mod: S$PBB,,, | Performed by: NURSE PRACTITIONER

## 2019-04-24 NOTE — PROGRESS NOTES
Subjective:       Patient ID: Josie Allen is a 73 y.o. female.    Chief Complaint: 2 week f/u    HPI  Josie Allen is a 73 y.o. female who presents today for follow up in regards to a boil in the vaginal area.  She has been taking keflex and using bactroban.  She ended up only taking the keflex for 7 days, but her boil is completely resolved.  She has no more pain, irritation or drainage from the area.  She did use the bactroban for the full 10 days and denies any other acute complaints/concerns at this time.    Review of Systems   Constitutional: Negative for activity change, fever and unexpected weight change.   HENT: Negative for hearing loss.    Eyes: Negative for visual disturbance.   Respiratory: Negative for shortness of breath and wheezing.    Cardiovascular: Negative for chest pain, palpitations and leg swelling.   Gastrointestinal: Negative for abdominal pain, constipation and diarrhea.   Genitourinary: Positive for frequency. Negative for dyspareunia, dysuria, urgency, vaginal bleeding and vaginal discharge.   Musculoskeletal: Negative for gait problem and neck pain.   Skin: Negative for rash and wound.   Allergic/Immunologic: Negative for immunocompromised state.   Neurological: Negative for tremors, speech difficulty and weakness.   Hematological: Does not bruise/bleed easily.   Psychiatric/Behavioral: Negative for agitation and confusion.       Objective:      Physical Exam   Constitutional: She is oriented to person, place, and time. She appears well-developed and well-nourished. No distress.   HENT:   Head: Normocephalic and atraumatic.   Neck: Neck supple. No thyromegaly present.   Pulmonary/Chest: Effort normal. No respiratory distress.   Abdominal: Soft. There is no tenderness. No hernia.   Genitourinary:   Genitourinary Comments: Area WNL, no residual tenderness or nodules noted   Musculoskeletal: Normal range of motion.   Neurological: She is alert and oriented to person, place,  and time.   Skin: Skin is warm and dry. No rash noted.   Psychiatric: She has a normal mood and affect. Her behavior is normal.     Pelvic Exam:  V: No lesions. No palpable nodes.     Assessment:       1. Urinary frequency    2. Boil of groin        Plan:       Urinary frequency- monitor  -     POCT urine dipstick without microscope    Boil of groin- resolved    RTC reg. Sched. appt for pessary.

## 2019-06-06 ENCOUNTER — TELEPHONE (OUTPATIENT)
Dept: UROGYNECOLOGY | Facility: CLINIC | Age: 74
End: 2019-06-06

## 2019-06-06 ENCOUNTER — OFFICE VISIT (OUTPATIENT)
Dept: UROGYNECOLOGY | Facility: CLINIC | Age: 74
End: 2019-06-06
Payer: MEDICARE

## 2019-06-06 VITALS
HEIGHT: 66 IN | WEIGHT: 198.63 LBS | BODY MASS INDEX: 31.92 KG/M2 | DIASTOLIC BLOOD PRESSURE: 92 MMHG | HEART RATE: 73 BPM | SYSTOLIC BLOOD PRESSURE: 134 MMHG

## 2019-06-06 DIAGNOSIS — R35.0 URINARY FREQUENCY: Primary | ICD-10-CM

## 2019-06-06 DIAGNOSIS — N39.8 VOIDING DYSFUNCTION: ICD-10-CM

## 2019-06-06 DIAGNOSIS — N95.2 ATROPHIC VAGINITIS: ICD-10-CM

## 2019-06-06 DIAGNOSIS — N81.11 CYSTOCELE, MIDLINE: ICD-10-CM

## 2019-06-06 DIAGNOSIS — Z46.89 PESSARY MAINTENANCE: ICD-10-CM

## 2019-06-06 DIAGNOSIS — N81.9 VAGINAL VAULT PROLAPSE: ICD-10-CM

## 2019-06-06 LAB
BILIRUB SERPL-MCNC: ABNORMAL MG/DL
BLOOD URINE, POC: ABNORMAL
COLOR, POC UA: ABNORMAL
GLUCOSE UR QL STRIP: ABNORMAL
KETONES UR QL STRIP: ABNORMAL
LEUKOCYTE ESTERASE URINE, POC: ABNORMAL
NITRITE, POC UA: ABNORMAL
PH, POC UA: 7
PROTEIN, POC: ABNORMAL
SPECIFIC GRAVITY, POC UA: 1
UROBILINOGEN, POC UA: ABNORMAL

## 2019-06-06 PROCEDURE — 81002 URINALYSIS NONAUTO W/O SCOPE: CPT | Mod: PBBFAC,PO | Performed by: NURSE PRACTITIONER

## 2019-06-06 PROCEDURE — 99213 PR OFFICE/OUTPT VISIT, EST, LEVL III, 20-29 MIN: ICD-10-PCS | Mod: S$PBB,,, | Performed by: NURSE PRACTITIONER

## 2019-06-06 PROCEDURE — 99999 PR PBB SHADOW E&M-EST. PATIENT-LVL III: CPT | Mod: PBBFAC,,, | Performed by: NURSE PRACTITIONER

## 2019-06-06 PROCEDURE — 99213 OFFICE O/P EST LOW 20 MIN: CPT | Mod: S$PBB,,, | Performed by: NURSE PRACTITIONER

## 2019-06-06 PROCEDURE — 99213 OFFICE O/P EST LOW 20 MIN: CPT | Mod: PBBFAC,PO | Performed by: NURSE PRACTITIONER

## 2019-06-06 PROCEDURE — 99999 PR PBB SHADOW E&M-EST. PATIENT-LVL III: ICD-10-PCS | Mod: PBBFAC,,, | Performed by: NURSE PRACTITIONER

## 2019-06-06 RX ORDER — ESTRADIOL 0.1 MG/G
CREAM VAGINAL
Qty: 42.5 G | Refills: 3 | Status: SHIPPED | OUTPATIENT
Start: 2019-06-06 | End: 2019-10-23 | Stop reason: SDUPTHER

## 2019-06-06 NOTE — PROGRESS NOTES
Subjective:       Patient ID: Josie Allen is a 73 y.o. female.    Chief Complaint: pessary    HPI  Josie Allen is a 73 y.o. female who presents today for routine pessary maintenance.  She has been using a slightly larger #4 ring pessary and feels that it is working well for her.  She denies any vaginal discharge/bleeding or bulging around the pessary.  She denies any real urinary complaints.  Overall, she is fairly happy with her status and is ready to proceed with the pessary.    Review of Systems   Constitutional: Negative for activity change, fever and unexpected weight change.   HENT: Negative for hearing loss.    Eyes: Negative for visual disturbance.   Respiratory: Negative for shortness of breath and wheezing.    Cardiovascular: Negative for chest pain, palpitations and leg swelling.   Gastrointestinal: Negative for abdominal pain, constipation and diarrhea.   Genitourinary: Negative for dyspareunia, dysuria, frequency, urgency, vaginal bleeding and vaginal discharge.   Musculoskeletal: Negative for gait problem and neck pain.   Skin: Negative for rash and wound.   Allergic/Immunologic: Negative for immunocompromised state.   Neurological: Negative for tremors, speech difficulty and weakness.   Hematological: Does not bruise/bleed easily.   Psychiatric/Behavioral: Negative for agitation and confusion.       Objective:      Physical Exam   Constitutional: She is oriented to person, place, and time. She appears well-developed and well-nourished. No distress.   HENT:   Head: Normocephalic and atraumatic.   Neck: Neck supple. No thyromegaly present.   Pulmonary/Chest: Effort normal. No respiratory distress.   Abdominal: Soft. There is no tenderness. No hernia.   Musculoskeletal: Normal range of motion.   Neurological: She is alert and oriented to person, place, and time.   Skin: Skin is warm and dry. No rash noted.   Psychiatric: She has a normal mood and affect. Her behavior is normal.      Pelvic Exam:  V: No lesions. No palpable nodes.   Va: small amount of thin yellow discharge.  No bleeding noted. Good length.  Dominant midline cystocele Ba=0, vaginal vault prolapse to -2  Meatus:No caruncle or stenosis  Urethra: Non tender. No suburethral masses.  Cx/Cuff: Normal   Uterus: surgically absent  Ad: No mass or tenderness.  Levators :Symmetrical. Normal tone. Non tender.  BL: Non tender  RV: No hemorrhoids.      Assessment:       1. Urinary frequency    2. Voiding dysfunction    3. Cystocele, midline    4. Vaginal vault prolapse    5. Pessary maintenance    6. Atrophic vaginitis          Procedure note- #4 ring pessary removed and cleaned with soap and water.  After betadine irrigation of the vagina, #4 ring pessary was reinserted into the vagina.  Pt ambulated in the room and denies any discomfort.  NP reminded pt that the first 24-48 hours are the most likely time for the pessary to fall out and to monitor the toilet before flushing.  Pt verbalized understanding.      Plan:       Urinary frequency- monitor  -     POCT urine dipstick without microscope    Voiding dysfunction- monitor    Cystocele, midline- continue with ring pessary    Vaginal vault prolapse- continue with ring pessary    Pessary maintenance- continue with ring pessary    Atrophic vaginitis- as noted below to see if this is cheaper than the premarin  -     estradiol (ESTRACE) 0.01 % (0.1 mg/gram) vaginal cream; Apply 1 fingertipful to vaginal area nightly x 2 weeks then every other night x 2 weeks then start using two times a week  Dispense: 42.5 g; Refill: 3    RTC 3 months

## 2019-06-06 NOTE — TELEPHONE ENCOUNTER
Called patient that  The estradiol cream was not covered. Offered to call in cream to [facility] to get cream compounded and sent to home , informed it would be around 60-65 dollars. Patient wants to think about it , states when she sees salvador again she will let her know.

## 2019-09-05 ENCOUNTER — OFFICE VISIT (OUTPATIENT)
Dept: UROGYNECOLOGY | Facility: CLINIC | Age: 74
End: 2019-09-05
Payer: MEDICARE

## 2019-09-05 VITALS
DIASTOLIC BLOOD PRESSURE: 80 MMHG | HEIGHT: 66 IN | HEART RATE: 79 BPM | SYSTOLIC BLOOD PRESSURE: 126 MMHG | BODY MASS INDEX: 31.89 KG/M2 | WEIGHT: 198.44 LBS

## 2019-09-05 DIAGNOSIS — N95.2 ATROPHIC VAGINITIS: ICD-10-CM

## 2019-09-05 DIAGNOSIS — R35.0 URINARY FREQUENCY: Primary | ICD-10-CM

## 2019-09-05 DIAGNOSIS — Z46.89 PESSARY MAINTENANCE: ICD-10-CM

## 2019-09-05 DIAGNOSIS — N81.11 CYSTOCELE, MIDLINE: ICD-10-CM

## 2019-09-05 DIAGNOSIS — N81.9 VAGINAL VAULT PROLAPSE: ICD-10-CM

## 2019-09-05 DIAGNOSIS — N39.8 VOIDING DYSFUNCTION: ICD-10-CM

## 2019-09-05 LAB
BILIRUB SERPL-MCNC: NORMAL MG/DL
BLOOD URINE, POC: NORMAL
COLOR, POC UA: NORMAL
GLUCOSE UR QL STRIP: NORMAL
KETONES UR QL STRIP: NORMAL
LEUKOCYTE ESTERASE URINE, POC: NORMAL
NITRITE, POC UA: NORMAL
PH, POC UA: 7
PROTEIN, POC: NORMAL
SPECIFIC GRAVITY, POC UA: 1000
UROBILINOGEN, POC UA: NORMAL

## 2019-09-05 PROCEDURE — 99213 PR OFFICE/OUTPT VISIT, EST, LEVL III, 20-29 MIN: ICD-10-PCS | Mod: S$PBB,,, | Performed by: NURSE PRACTITIONER

## 2019-09-05 PROCEDURE — 99213 OFFICE O/P EST LOW 20 MIN: CPT | Mod: PBBFAC,PO | Performed by: NURSE PRACTITIONER

## 2019-09-05 PROCEDURE — 99213 OFFICE O/P EST LOW 20 MIN: CPT | Mod: S$PBB,,, | Performed by: NURSE PRACTITIONER

## 2019-09-05 PROCEDURE — 81002 URINALYSIS NONAUTO W/O SCOPE: CPT | Mod: PBBFAC,PO | Performed by: NURSE PRACTITIONER

## 2019-09-05 PROCEDURE — 99999 PR PBB SHADOW E&M-EST. PATIENT-LVL III: ICD-10-PCS | Mod: PBBFAC,,, | Performed by: NURSE PRACTITIONER

## 2019-09-05 PROCEDURE — 99999 PR PBB SHADOW E&M-EST. PATIENT-LVL III: CPT | Mod: PBBFAC,,, | Performed by: NURSE PRACTITIONER

## 2019-09-05 NOTE — PROGRESS NOTES
Subjective:       Patient ID: Josie Allen is a 74 y.o. female.    Chief Complaint: 3 month pessary    HPI  Josie Allen is a 74 y.o. female who presents today for routine pessary maintenance.  She has been doing well with the pessary and denies any vaginal discharge, bleeding or bulging around the pessary.  She did go on vacation and forgot her premarin cream.  She developed some dryness, but used OTC Replense and this did seem to help.  She has been having some issues with her bladder in regards to frequency and occasional incontinence, but does not particularly want to do anything.  She denies any other acute complaints/concerns at this time and is ready to proceed with the pessary.    Review of Systems   Constitutional: Negative for activity change, fever and unexpected weight change.   HENT: Negative for hearing loss.    Eyes: Negative for visual disturbance.   Respiratory: Negative for shortness of breath and wheezing.    Cardiovascular: Negative for chest pain, palpitations and leg swelling.   Gastrointestinal: Negative for abdominal pain, constipation and diarrhea.   Genitourinary: Positive for frequency and urgency. Negative for dyspareunia, dysuria, vaginal bleeding and vaginal discharge.   Musculoskeletal: Negative for gait problem and neck pain.   Skin: Negative for rash and wound.   Allergic/Immunologic: Negative for immunocompromised state.   Neurological: Negative for tremors, speech difficulty and weakness.   Hematological: Does not bruise/bleed easily.   Psychiatric/Behavioral: Negative for agitation and confusion.       Objective:      Physical Exam   Constitutional: She is oriented to person, place, and time. She appears well-developed and well-nourished. No distress.   HENT:   Head: Normocephalic and atraumatic.   Neck: Neck supple. No thyromegaly present.   Pulmonary/Chest: Effort normal. No respiratory distress.   Abdominal: Soft. There is no tenderness. No hernia.    Musculoskeletal: Normal range of motion.   Neurological: She is alert and oriented to person, place, and time.   Skin: Skin is warm and dry. No rash noted.   Psychiatric: She has a normal mood and affect. Her behavior is normal.     Pelvic Exam:  V: No lesions. No palpable nodes.   Va: no discharge or bleeding.  Tissue is atrophic.  Dominant midline cystocele Ba=0, vaginal vault prolapse to -1 position  Meatus:No caruncle or stenosis  Urethra: Non tender. No suburethral masses.  Cx/Cuff: Normal   Uterus: surgically absent  Ad: No mass or tenderness.  Levators :Symmetrical. Normal tone. Non tender.  BL: Non tender  RV: No hemorrhoids.      Assessment:       1. Urinary frequency    2. Voiding dysfunction    3. Cystocele, midline    4. Vaginal vault prolapse    5. Pessary maintenance    6. Atrophic vaginitis          Procedure note- #4 ring pessary removed and cleaned with soap and water.  After betadine irrigation of the vagina, #4 ring pessary was reinserted into the vagina.  Pt ambulated in the room and denies any discomfort.  NP reminded pt that the first 24-48 hours are the most likely time for the pessary to fall out and to monitor the toilet before flushing.  Pt verbalized understanding.      Plan:       Urinary frequency- monitor at this time.  -     POCT URINE DIPSTICK WITHOUT MICROSCOPE    Voiding dysfunction- as noted above    Cystocele, midline- continue with ring pessary    Vaginal vault prolapse- as noted above    Pessary maintenance- as noted above    Atrophic vaginitis- continue premarin    RTC 3 months

## 2019-10-21 ENCOUNTER — TELEPHONE (OUTPATIENT)
Dept: UROGYNECOLOGY | Facility: CLINIC | Age: 74
End: 2019-10-21

## 2019-10-21 NOTE — TELEPHONE ENCOUNTER
----- Message from Remberto Stover sent at 10/21/2019 10:09 AM CDT -----  Contact: patient  Type: Needs Medical Advice    Who Called:  patient  Symptoms (please be specific):    How long has patient had these symptoms:    Pharmacy name and phone #:    Best Call Back Number: [phone]  Additional Information: requesting  A call back stated experiencing some spotting,patient has a pessary.

## 2019-10-21 NOTE — TELEPHONE ENCOUNTER
Called and spoke to pt  And she states she noticed some blood on her panties and was concerned, states that  She may have a scratch in there from the pessary. States that she was doing a lot of lifting on Saturday and noticed so discomfort then but it went away. Is feeling okay right now but scheduled appt for Wednesday with Deandre.

## 2019-10-23 ENCOUNTER — OFFICE VISIT (OUTPATIENT)
Dept: UROGYNECOLOGY | Facility: CLINIC | Age: 74
End: 2019-10-23
Payer: MEDICARE

## 2019-10-23 VITALS
HEART RATE: 82 BPM | DIASTOLIC BLOOD PRESSURE: 91 MMHG | SYSTOLIC BLOOD PRESSURE: 139 MMHG | BODY MASS INDEX: 31.71 KG/M2 | HEIGHT: 66 IN | WEIGHT: 197.31 LBS

## 2019-10-23 DIAGNOSIS — N89.8 VAGINAL IRRITATION FROM PESSARY: ICD-10-CM

## 2019-10-23 DIAGNOSIS — T83.89XA VAGINAL IRRITATION FROM PESSARY: ICD-10-CM

## 2019-10-23 DIAGNOSIS — N81.11 CYSTOCELE, MIDLINE: ICD-10-CM

## 2019-10-23 DIAGNOSIS — Z46.89 PESSARY MAINTENANCE: ICD-10-CM

## 2019-10-23 DIAGNOSIS — N81.9 VAGINAL VAULT PROLAPSE: ICD-10-CM

## 2019-10-23 DIAGNOSIS — N95.2 ATROPHIC VAGINITIS: ICD-10-CM

## 2019-10-23 DIAGNOSIS — R35.0 URINARY FREQUENCY: Primary | ICD-10-CM

## 2019-10-23 LAB
BILIRUB SERPL-MCNC: ABNORMAL MG/DL
BLOOD URINE, POC: ABNORMAL
COLOR, POC UA: ABNORMAL
GLUCOSE UR QL STRIP: ABNORMAL
KETONES UR QL STRIP: ABNORMAL
LEUKOCYTE ESTERASE URINE, POC: ABNORMAL
NITRITE, POC UA: ABNORMAL
PH, POC UA: 5
PROTEIN, POC: ABNORMAL
SPECIFIC GRAVITY, POC UA: 1000
UROBILINOGEN, POC UA: ABNORMAL

## 2019-10-23 PROCEDURE — 99999 PR PBB SHADOW E&M-EST. PATIENT-LVL III: CPT | Mod: PBBFAC,,, | Performed by: NURSE PRACTITIONER

## 2019-10-23 PROCEDURE — 87086 URINE CULTURE/COLONY COUNT: CPT

## 2019-10-23 PROCEDURE — 81002 URINALYSIS NONAUTO W/O SCOPE: CPT | Mod: PBBFAC,PO | Performed by: NURSE PRACTITIONER

## 2019-10-23 PROCEDURE — 99213 OFFICE O/P EST LOW 20 MIN: CPT | Mod: S$PBB,,, | Performed by: NURSE PRACTITIONER

## 2019-10-23 PROCEDURE — 99213 PR OFFICE/OUTPT VISIT, EST, LEVL III, 20-29 MIN: ICD-10-PCS | Mod: S$PBB,,, | Performed by: NURSE PRACTITIONER

## 2019-10-23 PROCEDURE — 99213 OFFICE O/P EST LOW 20 MIN: CPT | Mod: PBBFAC,PO | Performed by: NURSE PRACTITIONER

## 2019-10-23 PROCEDURE — 99999 PR PBB SHADOW E&M-EST. PATIENT-LVL III: ICD-10-PCS | Mod: PBBFAC,,, | Performed by: NURSE PRACTITIONER

## 2019-10-23 NOTE — PROGRESS NOTES
Subjective:       Patient ID: Josie Allen is a 74 y.o. female.    Chief Complaint: vaginal spotting from pessary    HPI  Josie Allen is a 74 y.o. female who presents today for problems with her pessary.  About 2 weeks ago, she was straining and doing some heavy lifting cleaning the outside of her house.  A few days after that she saw some streaks of blood in her underwear.  She has not seen any blood since, but does occasionally have a pinching sensation as if the pessary is moving.  She is concerned that with all the activity she might have caused a sore spot in the vagina.  She has also been having a lot of urinary frequency at this time.  She denies any real dysuria, but does have some hesitancy and some post void fullness at times.  She is not sure what the cause of this could be.      Review of Systems   Constitutional: Negative for activity change, fever and unexpected weight change.   HENT: Negative for hearing loss.    Eyes: Negative for visual disturbance.   Respiratory: Negative for shortness of breath and wheezing.    Cardiovascular: Negative for chest pain, palpitations and leg swelling.   Gastrointestinal: Negative for abdominal pain, constipation and diarrhea.   Genitourinary: Positive for difficulty urinating and frequency. Negative for dyspareunia, dysuria, urgency, vaginal bleeding and vaginal discharge.   Musculoskeletal: Negative for gait problem and neck pain.   Skin: Negative for rash and wound.   Allergic/Immunologic: Negative for immunocompromised state.   Neurological: Negative for tremors, speech difficulty and weakness.   Hematological: Does not bruise/bleed easily.   Psychiatric/Behavioral: Negative for agitation and confusion.       Objective:      Physical Exam   Constitutional: She is oriented to person, place, and time. She appears well-developed and well-nourished. No distress.   HENT:   Head: Normocephalic and atraumatic.   Neck: Neck supple. No thyromegaly present.    Pulmonary/Chest: Effort normal. No respiratory distress.   Abdominal: Soft. There is tenderness in the right lower quadrant. No hernia.   Very mild RLQ tenderness only with palpation.   Musculoskeletal: Normal range of motion.   Neurological: She is alert and oriented to person, place, and time.   Skin: Skin is warm and dry. No rash noted.   Psychiatric: She has a normal mood and affect. Her behavior is normal.     Pelvic Exam:  V: No lesions. No palpable nodes.   Va: no discharge or bleeding.  No areas of irritation noted.  Good length.  Dominant midline cystocele Ba=0  Meatus:No caruncle or stenosis  Urethra: Non tender. No suburethral masses.  Cx/Cuff: Normal   Uterus: surgically absent  Ad: No mass or tenderness.  Levators :Symmetrical. Normal tone. Non tender.  BL: Non tender  RV: No hemorrhoids.      Assessment:       1. Urinary frequency    2. Cystocele, midline    3. Vaginal vault prolapse    4. Pessary maintenance    5. Vaginal irritation from pessary    6. Atrophic vaginitis          Procedure note- #4 ring pessary removed with forceps and cleaned with soap and water.  After betadine irrigation of the vagina, #4 ring pessary was reinserted into the vagina.  Pt ambulated in the room and denies any discomfort.  NP reminded pt that the first 24-48 hours are the most likely time for the pessary to fall out and to monitor the toilet before flushing.  Pt verbalized understanding.      Plan:       Urinary frequency- we will send her urine for culture as noted below  -     POCT URINE DIPSTICK WITHOUT MICROSCOPE  -     Urine culture    Cystocele, midline- continue with ring pessary    Vaginal vault prolapse- as noted above    Pessary maintenance- as noted above    Vaginal irritation from pessary- no areas of irritation or bleeding noted in the vagina.  Monitor.  If pt sees any more bleeding/spotting she will return to the clinic hopefully the same day.    Atrophic vaginitis- as noted below  -     conjugated  estrogens (PREMARIN) vaginal cream; Place 1 g vaginally twice a week.  Dispense: 1 applicator; Refill: 0    RTC 2 months

## 2019-10-25 LAB
BACTERIA UR CULT: NORMAL
BACTERIA UR CULT: NORMAL

## 2019-12-12 ENCOUNTER — OFFICE VISIT (OUTPATIENT)
Dept: UROGYNECOLOGY | Facility: CLINIC | Age: 74
End: 2019-12-12
Payer: MEDICARE

## 2019-12-12 VITALS
SYSTOLIC BLOOD PRESSURE: 142 MMHG | HEIGHT: 66 IN | BODY MASS INDEX: 31.6 KG/M2 | HEART RATE: 74 BPM | DIASTOLIC BLOOD PRESSURE: 93 MMHG | WEIGHT: 196.63 LBS

## 2019-12-12 DIAGNOSIS — N81.11 CYSTOCELE, MIDLINE: ICD-10-CM

## 2019-12-12 DIAGNOSIS — N81.9 VAGINAL VAULT PROLAPSE: ICD-10-CM

## 2019-12-12 DIAGNOSIS — R35.0 URINARY FREQUENCY: Primary | ICD-10-CM

## 2019-12-12 DIAGNOSIS — N95.2 ATROPHIC VAGINITIS: ICD-10-CM

## 2019-12-12 DIAGNOSIS — Z46.89 PESSARY MAINTENANCE: ICD-10-CM

## 2019-12-12 LAB
BILIRUB SERPL-MCNC: ABNORMAL MG/DL
BLOOD URINE, POC: ABNORMAL
COLOR, POC UA: ABNORMAL
GLUCOSE UR QL STRIP: ABNORMAL
KETONES UR QL STRIP: ABNORMAL
LEUKOCYTE ESTERASE URINE, POC: ABNORMAL
NITRITE, POC UA: ABNORMAL
PH, POC UA: 6
PROTEIN, POC: ABNORMAL
SPECIFIC GRAVITY, POC UA: 1
UROBILINOGEN, POC UA: ABNORMAL

## 2019-12-12 PROCEDURE — 99213 OFFICE O/P EST LOW 20 MIN: CPT | Mod: S$PBB,,, | Performed by: NURSE PRACTITIONER

## 2019-12-12 PROCEDURE — 99999 PR PBB SHADOW E&M-EST. PATIENT-LVL III: CPT | Mod: PBBFAC,,, | Performed by: NURSE PRACTITIONER

## 2019-12-12 PROCEDURE — 1159F PR MEDICATION LIST DOCUMENTED IN MEDICAL RECORD: ICD-10-PCS | Mod: ,,, | Performed by: NURSE PRACTITIONER

## 2019-12-12 PROCEDURE — 99213 PR OFFICE/OUTPT VISIT, EST, LEVL III, 20-29 MIN: ICD-10-PCS | Mod: S$PBB,,, | Performed by: NURSE PRACTITIONER

## 2019-12-12 PROCEDURE — 1159F MED LIST DOCD IN RCRD: CPT | Mod: ,,, | Performed by: NURSE PRACTITIONER

## 2019-12-12 PROCEDURE — 1126F PR PAIN SEVERITY QUANTIFIED, NO PAIN PRESENT: ICD-10-PCS | Mod: ,,, | Performed by: NURSE PRACTITIONER

## 2019-12-12 PROCEDURE — 99999 PR PBB SHADOW E&M-EST. PATIENT-LVL III: ICD-10-PCS | Mod: PBBFAC,,, | Performed by: NURSE PRACTITIONER

## 2019-12-12 PROCEDURE — 1126F AMNT PAIN NOTED NONE PRSNT: CPT | Mod: ,,, | Performed by: NURSE PRACTITIONER

## 2019-12-12 PROCEDURE — 99213 OFFICE O/P EST LOW 20 MIN: CPT | Mod: PBBFAC,PO | Performed by: NURSE PRACTITIONER

## 2019-12-12 PROCEDURE — 81002 URINALYSIS NONAUTO W/O SCOPE: CPT | Mod: PBBFAC,PO | Performed by: NURSE PRACTITIONER

## 2019-12-12 NOTE — PROGRESS NOTES
Subjective:       Patient ID: Josie Allen is a 74 y.o. female.    Chief Complaint: 3 month pessary    HPI  Josie Allen is a 74 y.o. female who presents today for routine pessary maintenance.  She has been using the ring for awhile and feels that she does well with it.  She has been using the premarin cream, which was very expensive for her, twice a week.  She also uses trimosan PRN.  She feels that this combination has been helpful decreasing her discharge and odor.  She did have some streaks of blood on the toilet paper the other day, but had been straining with BM so is not sure if this is the cause of the blood.  She continues to have some urinary frequency and urgency and some incontinence at times.  She is not overly happy with her bladder and her prolapse, but at the same time she isn't sure it is bad enough to get very aggressive with it.  She denies any other acute complaints/concerns at this time and is ready to proceed with the pessary.    Review of Systems   Constitutional: Negative for activity change, fever and unexpected weight change.   HENT: Negative for hearing loss.    Eyes: Negative for visual disturbance.   Respiratory: Negative for shortness of breath and wheezing.    Cardiovascular: Negative for chest pain, palpitations and leg swelling.   Gastrointestinal: Negative for abdominal pain, constipation and diarrhea.   Genitourinary: Positive for frequency and urgency. Negative for dyspareunia, dysuria, vaginal bleeding and vaginal discharge.   Musculoskeletal: Negative for gait problem and neck pain.   Skin: Negative for rash and wound.   Allergic/Immunologic: Negative for immunocompromised state.   Neurological: Negative for tremors, speech difficulty and weakness.   Hematological: Does not bruise/bleed easily.   Psychiatric/Behavioral: Negative for agitation and confusion.       Objective:      Physical Exam   Constitutional: She is oriented to person, place, and time. She  appears well-developed and well-nourished. No distress.   HENT:   Head: Normocephalic and atraumatic.   Neck: Neck supple. No thyromegaly present.   Pulmonary/Chest: Effort normal. No respiratory distress.   Abdominal: Soft. There is no tenderness. No hernia.   Musculoskeletal: Normal range of motion.   Neurological: She is alert and oriented to person, place, and time.   Skin: Skin is warm and dry. No rash noted.   Psychiatric: She has a normal mood and affect. Her behavior is normal.     Pelvic Exam:  V: No lesions. No palpable nodes.   Va: no discharge or bleeding.  Good length.  Very mild irritation noted on the anterior vaginal wall near the 11 o'clock position.  Dominant midline cystocele Ba=0, vaginal vault prolapse  Meatus:No caruncle or stenosis  Urethra: Non tender. No suburethral masses.  Cx/Cuff: Normal   Uterus: surgically absent  Ad: No mass or tenderness.  Levators :Symmetrical. Normal tone. Non tender.  BL: Non tender  RV: No hemorrhoids.      Assessment:       1. Urinary frequency    2. Cystocele, midline    3. Vaginal vault prolapse    4. Pessary maintenance    5. Atrophic vaginitis          Procedure note- #4 ring pessary removed with forceps and cleaned with soap and water.  After betadine irrigation of the vagina, #4 ring pessary was reinserted into the vagina.  Pt ambulated in the room and denies any discomfort.  NP reminded pt that the first 24-48 hours are the most likely time for the pessary to fall out and to monitor the toilet before flushing.  Pt verbalized understanding.      Plan:       Urinary frequency- pt will consider pelvic floor PT.  Information sheet given to pt.  -     POCT urine dipstick without microscope    Cystocele, midline- continue with ring pessary    Vaginal vault prolapse- as noted above    Pessary maintenance- as noted above    Atrophic vaginitis- as noted above    RTC 2 months or PRN

## 2020-02-11 ENCOUNTER — OFFICE VISIT (OUTPATIENT)
Dept: UROGYNECOLOGY | Facility: CLINIC | Age: 75
End: 2020-02-11
Payer: MEDICARE

## 2020-02-11 VITALS
DIASTOLIC BLOOD PRESSURE: 93 MMHG | HEIGHT: 66 IN | SYSTOLIC BLOOD PRESSURE: 157 MMHG | BODY MASS INDEX: 31.5 KG/M2 | HEART RATE: 79 BPM | WEIGHT: 196 LBS

## 2020-02-11 DIAGNOSIS — N81.9 VAGINAL VAULT PROLAPSE: ICD-10-CM

## 2020-02-11 DIAGNOSIS — N95.2 ATROPHIC VAGINITIS: ICD-10-CM

## 2020-02-11 DIAGNOSIS — N81.11 CYSTOCELE, MIDLINE: ICD-10-CM

## 2020-02-11 DIAGNOSIS — Z46.89 PESSARY MAINTENANCE: ICD-10-CM

## 2020-02-11 DIAGNOSIS — R35.0 URINARY FREQUENCY: Primary | ICD-10-CM

## 2020-02-11 DIAGNOSIS — N39.41 URGE INCONTINENCE OF URINE: ICD-10-CM

## 2020-02-11 LAB
BILIRUB SERPL-MCNC: ABNORMAL MG/DL
BLOOD URINE, POC: ABNORMAL
COLOR, POC UA: YELLOW
GLUCOSE UR QL STRIP: ABNORMAL
KETONES UR QL STRIP: ABNORMAL
LEUKOCYTE ESTERASE URINE, POC: ABNORMAL
NITRITE, POC UA: ABNORMAL
PH, POC UA: 6
PROTEIN, POC: ABNORMAL
SPECIFIC GRAVITY, POC UA: 1
UROBILINOGEN, POC UA: ABNORMAL

## 2020-02-11 PROCEDURE — 99213 PR OFFICE/OUTPT VISIT, EST, LEVL III, 20-29 MIN: ICD-10-PCS | Mod: S$PBB,,, | Performed by: NURSE PRACTITIONER

## 2020-02-11 PROCEDURE — 99999 PR PBB SHADOW E&M-EST. PATIENT-LVL III: ICD-10-PCS | Mod: PBBFAC,,, | Performed by: NURSE PRACTITIONER

## 2020-02-11 PROCEDURE — 99999 PR PBB SHADOW E&M-EST. PATIENT-LVL III: CPT | Mod: PBBFAC,,, | Performed by: NURSE PRACTITIONER

## 2020-02-11 PROCEDURE — 99213 OFFICE O/P EST LOW 20 MIN: CPT | Mod: S$PBB,,, | Performed by: NURSE PRACTITIONER

## 2020-02-11 PROCEDURE — 81002 URINALYSIS NONAUTO W/O SCOPE: CPT | Mod: PBBFAC,PO | Performed by: NURSE PRACTITIONER

## 2020-02-11 PROCEDURE — 99213 OFFICE O/P EST LOW 20 MIN: CPT | Mod: PBBFAC,PO | Performed by: NURSE PRACTITIONER

## 2020-02-11 NOTE — PROGRESS NOTES
Subjective:       Patient ID: Josie Allen is a 74 y.o. female.    Chief Complaint: Pessary Check    HPI  Josie Allen is a 74 y.o. female who presents today for routine pessary maintenance.  She feels that the pessary works well for her.  She continues to have some vaginal discharge at times, but the trimosan does seem to help.  She is using this about 2 times a day.  She has also been using her premarin about 2 times a week.  She continues to have some problems with urinary frequency and incontinence.  We discussed pelvic floor PT in the past to see if this would help.  She is still thinking about this.  She denies any other acute complaints/concerns and is ready to proceed with the pessary.    Review of Systems   Constitutional: Negative for activity change, fever and unexpected weight change.   HENT: Negative for hearing loss.    Eyes: Negative for visual disturbance.   Respiratory: Negative for shortness of breath and wheezing.    Cardiovascular: Negative for chest pain, palpitations and leg swelling.   Gastrointestinal: Negative for abdominal pain, constipation and diarrhea.   Genitourinary: Positive for frequency and urgency. Negative for dyspareunia, dysuria, vaginal bleeding and vaginal discharge.   Musculoskeletal: Negative for gait problem and neck pain.   Skin: Negative for rash and wound.   Allergic/Immunologic: Negative for immunocompromised state.   Neurological: Negative for tremors, speech difficulty and weakness.   Hematological: Does not bruise/bleed easily.   Psychiatric/Behavioral: Negative for agitation and confusion.       Objective:      Physical Exam   Constitutional: She is oriented to person, place, and time. She appears well-developed and well-nourished. No distress.   HENT:   Head: Normocephalic and atraumatic.   Neck: Neck supple. No thyromegaly present.   Pulmonary/Chest: Effort normal. No respiratory distress.   Abdominal: Soft. There is no tenderness. No hernia.    Musculoskeletal: Normal range of motion.   Neurological: She is alert and oriented to person, place, and time.   Skin: Skin is warm and dry. No rash noted.   Psychiatric: She has a normal mood and affect. Her behavior is normal.     Pelvic Exam:  V: No lesions. No palpable nodes.   Va: small amount of thin yellow discharge.  No bleeding noted.  Good length.  Dominant cystocele Ba=+1, vaginal vault prolapse to 0 position.  Meatus:No caruncle or stenosis  Urethra: Non tender. No suburethral masses.  Cx/Cuff: Normal   Uterus: surgically absent  Ad: No mass or tenderness.  Levators :Symmetrical. Normal tone. Non tender.  BL: Non tender  RV: No hemorrhoids.      Assessment:       1. Urinary frequency    2. Urge incontinence of urine    3. Cystocele, midline    4. Vaginal vault prolapse    5. Pessary maintenance    6. Atrophic vaginitis          Procedure note- #4 ring pessary removed with forceps and cleaned with soap and water.  After betadine irrigation of the vagina, #4 ring pessary was reinserted into the vagina.  Pt ambulated in the room and denies any discomfort.  NP reminded pt that the first 24-48 hours are the most likely time for the pessary to fall out and to monitor the toilet before flushing.  Pt verbalized understanding.      Plan:       Urinary frequency- consider pelvic floor PT  -     POCT urine dipstick without microscope    Urge incontinence of urine- as noted above    Cystocele, midline- continue with ring pessary    Vaginal vault prolapse- as noted above    Pessary maintenance- as noted above    Atrophic vaginitis- continue premarin    RTC 10 weeks

## 2020-04-21 ENCOUNTER — OFFICE VISIT (OUTPATIENT)
Dept: UROGYNECOLOGY | Facility: CLINIC | Age: 75
End: 2020-04-21
Payer: MEDICARE

## 2020-04-21 VITALS
HEIGHT: 66 IN | SYSTOLIC BLOOD PRESSURE: 177 MMHG | BODY MASS INDEX: 31.18 KG/M2 | WEIGHT: 194 LBS | HEART RATE: 75 BPM | DIASTOLIC BLOOD PRESSURE: 94 MMHG

## 2020-04-21 DIAGNOSIS — N81.11 CYSTOCELE, MIDLINE: ICD-10-CM

## 2020-04-21 DIAGNOSIS — N81.9 VAGINAL VAULT PROLAPSE: ICD-10-CM

## 2020-04-21 DIAGNOSIS — N39.41 URGE INCONTINENCE OF URINE: ICD-10-CM

## 2020-04-21 DIAGNOSIS — N95.2 ATROPHIC VAGINITIS: ICD-10-CM

## 2020-04-21 DIAGNOSIS — Z46.89 PESSARY MAINTENANCE: ICD-10-CM

## 2020-04-21 DIAGNOSIS — R35.0 URINARY FREQUENCY: Primary | ICD-10-CM

## 2020-04-21 LAB
BILIRUB SERPL-MCNC: ABNORMAL MG/DL
BLOOD URINE, POC: ABNORMAL
COLOR, POC UA: ABNORMAL
GLUCOSE UR QL STRIP: ABNORMAL
KETONES UR QL STRIP: ABNORMAL
LEUKOCYTE ESTERASE URINE, POC: ABNORMAL
NITRITE, POC UA: ABNORMAL
PH, POC UA: 6
PROTEIN, POC: ABNORMAL
SPECIFIC GRAVITY, POC UA: 1000
UROBILINOGEN, POC UA: ABNORMAL

## 2020-04-21 PROCEDURE — 99213 OFFICE O/P EST LOW 20 MIN: CPT | Mod: S$PBB,,, | Performed by: NURSE PRACTITIONER

## 2020-04-21 PROCEDURE — 99999 PR PBB SHADOW E&M-EST. PATIENT-LVL III: ICD-10-PCS | Mod: PBBFAC,,, | Performed by: NURSE PRACTITIONER

## 2020-04-21 PROCEDURE — 99213 OFFICE O/P EST LOW 20 MIN: CPT | Mod: PBBFAC,PO | Performed by: NURSE PRACTITIONER

## 2020-04-21 PROCEDURE — 99999 PR PBB SHADOW E&M-EST. PATIENT-LVL III: CPT | Mod: PBBFAC,,, | Performed by: NURSE PRACTITIONER

## 2020-04-21 PROCEDURE — 81002 URINALYSIS NONAUTO W/O SCOPE: CPT | Mod: PBBFAC,PO | Performed by: NURSE PRACTITIONER

## 2020-04-21 PROCEDURE — 99213 PR OFFICE/OUTPT VISIT, EST, LEVL III, 20-29 MIN: ICD-10-PCS | Mod: S$PBB,,, | Performed by: NURSE PRACTITIONER

## 2020-04-21 RX ORDER — ESCITALOPRAM OXALATE 10 MG/1
10 TABLET ORAL
COMMUNITY
Start: 2020-02-18 | End: 2020-05-18

## 2020-04-21 RX ORDER — ESCITALOPRAM OXALATE 10 MG/1
TABLET ORAL
COMMUNITY
Start: 2020-04-20 | End: 2023-02-02 | Stop reason: SDUPTHER

## 2020-04-21 NOTE — PROGRESS NOTES
Subjective:       Patient ID: Josie Allen is a 74 y.o. female.    Chief Complaint: Pessary Check    HPI  Josie Allen is a 74 y.o. female who presents today for routine pessary maintenance.  She was last seen on 02/11/20.  Since that time, she feels that she has been doing fairly well.  She has not had any problems with vaginal discharge, bleeding or bulging around the pessary.  She continues to have some urgency and some UUI, but it is not overly bothersome at this time.  She overall feels that she has been doing well and is ready to proceed with the pessary.    Review of Systems   Constitutional: Negative for activity change, fever and unexpected weight change.   HENT: Negative for hearing loss.    Eyes: Negative for visual disturbance.   Respiratory: Negative for shortness of breath and wheezing.    Cardiovascular: Negative for chest pain, palpitations and leg swelling.   Gastrointestinal: Negative for abdominal pain, constipation and diarrhea.   Genitourinary: Positive for frequency and urgency. Negative for dyspareunia, dysuria, vaginal bleeding and vaginal discharge.   Musculoskeletal: Negative for gait problem and neck pain.   Skin: Negative for rash and wound.   Allergic/Immunologic: Negative for immunocompromised state.   Neurological: Negative for tremors, speech difficulty and weakness.   Hematological: Does not bruise/bleed easily.   Psychiatric/Behavioral: Negative for agitation and confusion.       Objective:      Physical Exam   Constitutional: She is oriented to person, place, and time. She appears well-developed and well-nourished. No distress.   HENT:   Head: Normocephalic and atraumatic.   Neck: Neck supple. No thyromegaly present.   Pulmonary/Chest: Effort normal. No respiratory distress.   Abdominal: Soft. There is no tenderness. No hernia.   Musculoskeletal: Normal range of motion.   Neurological: She is alert and oriented to person, place, and time.   Skin: Skin is warm and  dry. No rash noted.   Psychiatric: She has a normal mood and affect. Her behavior is normal.     Pelvic Exam:  V: No lesions. No palpable nodes.   Va: small amount of thin yellow discharge.  No bleeding noted.  Good length.  Dominant midline cystocele Ba=0  Meatus:No caruncle or stenosis  Urethra: Non tender. No suburethral masses.  Cx/Cuff: Normal   Uterus: surgically absent  Ad: No mass or tenderness.  Levators :Symmetrical. Normal tone. Non tender.  BL: Non tender  RV: No hemorrhoids.      Assessment:       1. Urinary frequency    2. Urge incontinence of urine    3. Cystocele, midline    4. Vaginal vault prolapse    5. Pessary maintenance    6. Atrophic vaginitis          Procedure note- #4 ring pessary removed and cleaned with soap and water.  After betadine irrigation of the vagina, #4 ring pessary was reinserted into the vagina.  Pt ambulated in the room and denies any discomfort.  NP reminded pt that the first 24-48 hours are the most likely time for the pessary to fall out and to monitor the toilet before flushing.  Pt verbalized understanding.      Plan:       Urinary frequency- monitor  -     POCT urine dipstick without microscope    Urge incontinence of urine- monitor    Cystocele, midline- continue with ring pessary    Vaginal vault prolapse- continue with ring pessary    Pessary maintenance- continue with ring pessary    Atrophic vaginitis- continue with premarin    RTC 10 weeks

## 2020-06-30 ENCOUNTER — OFFICE VISIT (OUTPATIENT)
Dept: UROGYNECOLOGY | Facility: CLINIC | Age: 75
End: 2020-06-30
Payer: MEDICARE

## 2020-06-30 VITALS
HEART RATE: 76 BPM | SYSTOLIC BLOOD PRESSURE: 145 MMHG | HEIGHT: 66 IN | BODY MASS INDEX: 31.36 KG/M2 | DIASTOLIC BLOOD PRESSURE: 79 MMHG | WEIGHT: 195.13 LBS

## 2020-06-30 DIAGNOSIS — R35.0 URINARY FREQUENCY: Primary | ICD-10-CM

## 2020-06-30 DIAGNOSIS — N81.11 CYSTOCELE, MIDLINE: ICD-10-CM

## 2020-06-30 DIAGNOSIS — N95.2 ATROPHIC VAGINITIS: ICD-10-CM

## 2020-06-30 DIAGNOSIS — Z46.89 PESSARY MAINTENANCE: ICD-10-CM

## 2020-06-30 DIAGNOSIS — N81.9 VAGINAL VAULT PROLAPSE: ICD-10-CM

## 2020-06-30 DIAGNOSIS — N39.41 URGE INCONTINENCE OF URINE: ICD-10-CM

## 2020-06-30 LAB
BILIRUB SERPL-MCNC: ABNORMAL MG/DL
BLOOD URINE, POC: ABNORMAL
CLARITY, POC UA: CLEAR
COLOR, POC UA: YELLOW
GLUCOSE UR QL STRIP: ABNORMAL
KETONES UR QL STRIP: ABNORMAL
LEUKOCYTE ESTERASE URINE, POC: ABNORMAL
NITRITE, POC UA: ABNORMAL
PH, POC UA: 6
PROTEIN, POC: ABNORMAL
SPECIFIC GRAVITY, POC UA: 1
UROBILINOGEN, POC UA: ABNORMAL

## 2020-06-30 PROCEDURE — 99999 PR PBB SHADOW E&M-EST. PATIENT-LVL III: CPT | Mod: PBBFAC,,, | Performed by: NURSE PRACTITIONER

## 2020-06-30 PROCEDURE — 99213 OFFICE O/P EST LOW 20 MIN: CPT | Mod: S$PBB,,, | Performed by: NURSE PRACTITIONER

## 2020-06-30 PROCEDURE — 99999 PR PBB SHADOW E&M-EST. PATIENT-LVL III: ICD-10-PCS | Mod: PBBFAC,,, | Performed by: NURSE PRACTITIONER

## 2020-06-30 PROCEDURE — 81002 URINALYSIS NONAUTO W/O SCOPE: CPT | Mod: PBBFAC,PO | Performed by: NURSE PRACTITIONER

## 2020-06-30 PROCEDURE — 99213 OFFICE O/P EST LOW 20 MIN: CPT | Mod: PBBFAC,PO | Performed by: NURSE PRACTITIONER

## 2020-06-30 PROCEDURE — 99213 PR OFFICE/OUTPT VISIT, EST, LEVL III, 20-29 MIN: ICD-10-PCS | Mod: S$PBB,,, | Performed by: NURSE PRACTITIONER

## 2020-06-30 NOTE — PROGRESS NOTES
Subjective:       Patient ID: Josie Allen is a 74 y.o. female.    Chief Complaint: pessary check    HPI  Josie Allen is a 74 y.o. female who presents today for routine pessary maintenance.  She has been using the ring pessary for awhile and feels that it works well for her.  She denies any vaginal discharge/bleeding or bulging around the pessary.  She has the premarin cream, but it was very expensive for her, so she uses it sparingly.  She denies any real urinary complaints/concerns.  She is ready to proceed with the pessary.    Review of Systems   Constitutional: Negative for activity change, fever and unexpected weight change.   HENT: Negative for hearing loss.    Eyes: Negative for visual disturbance.   Respiratory: Negative for shortness of breath and wheezing.    Cardiovascular: Negative for chest pain, palpitations and leg swelling.   Gastrointestinal: Negative for abdominal pain, constipation and diarrhea.   Genitourinary: Positive for frequency. Negative for dyspareunia, dysuria, urgency, vaginal bleeding and vaginal discharge.   Musculoskeletal: Negative for gait problem and neck pain.   Skin: Negative for rash and wound.   Allergic/Immunologic: Negative for immunocompromised state.   Neurological: Negative for tremors, speech difficulty and weakness.   Hematological: Does not bruise/bleed easily.   Psychiatric/Behavioral: Negative for agitation and confusion.       Objective:      Physical Exam  Constitutional:       General: She is not in acute distress.     Appearance: She is well-developed.   HENT:      Head: Normocephalic and atraumatic.   Neck:      Musculoskeletal: Neck supple.      Thyroid: No thyromegaly.   Pulmonary:      Effort: Pulmonary effort is normal. No respiratory distress.   Abdominal:      Palpations: Abdomen is soft.      Tenderness: There is no abdominal tenderness.      Hernia: No hernia is present.   Musculoskeletal: Normal range of motion.   Skin:     General: Skin  is warm and dry.      Findings: No rash.   Neurological:      Mental Status: She is alert and oriented to person, place, and time.   Psychiatric:         Behavior: Behavior normal.       Pelvic Exam:  V: No lesions. No palpable nodes.   Va: no discharge or bleeding.  Good length.  Dominant midline cystocele Ba=0, vaginal vault prolapse to -2 position.  Meatus:No caruncle or stenosis  Urethra: Non tender. No suburethral masses.  Cx/Cuff: Normal   Uterus: surgically absent  Ad: No mass or tenderness.  Levators :Symmetrical. Normal tone. Non tender.  BL: Non tender  RV: No hemorrhoids.      Assessment:       1. Urinary frequency    2. Urge incontinence of urine    3. Cystocele, midline    4. Vaginal vault prolapse    5. Pessary maintenance    6. Atrophic vaginitis          Procedure note- #4 ring pessary removed and cleaned with soap and water.  After betadine irrigation of the vagina, #4 ring pessary was reinserted into the vagina.  Pt ambulated in the room and denies any discomfort.  NP reminded pt that the first 24-48 hours are the most likely time for the pessary to fall out and to monitor the toilet before flushing.  Pt verbalized understanding.      Plan:       Urinary frequency- monitor  -     POCT urine dipstick without microscope    Urge incontinence of urine- monitor    Cystocele, midline- continue with ring pessary    Vaginal vault prolapse- as noted above    Pessary maintenance- as noted above    Atrophic vaginitis- continue with premarin.  Consider compounding estrace cream in the future.    RTC 3 months

## 2020-09-29 ENCOUNTER — OFFICE VISIT (OUTPATIENT)
Dept: UROGYNECOLOGY | Facility: CLINIC | Age: 75
End: 2020-09-29
Payer: MEDICARE

## 2020-09-29 VITALS
HEART RATE: 75 BPM | BODY MASS INDEX: 31.96 KG/M2 | HEIGHT: 66 IN | SYSTOLIC BLOOD PRESSURE: 154 MMHG | DIASTOLIC BLOOD PRESSURE: 98 MMHG | WEIGHT: 198.88 LBS

## 2020-09-29 DIAGNOSIS — N95.2 ATROPHIC VAGINITIS: ICD-10-CM

## 2020-09-29 DIAGNOSIS — N81.9 VAGINAL VAULT PROLAPSE: ICD-10-CM

## 2020-09-29 DIAGNOSIS — N81.11 CYSTOCELE, MIDLINE: ICD-10-CM

## 2020-09-29 DIAGNOSIS — Z46.89 PESSARY MAINTENANCE: ICD-10-CM

## 2020-09-29 DIAGNOSIS — R35.0 URINARY FREQUENCY: Primary | ICD-10-CM

## 2020-09-29 DIAGNOSIS — N39.41 URGE INCONTINENCE OF URINE: ICD-10-CM

## 2020-09-29 LAB
BILIRUB SERPL-MCNC: ABNORMAL MG/DL
BLOOD URINE, POC: ABNORMAL
CLARITY, POC UA: CLEAR
COLOR, POC UA: YELLOW
GLUCOSE UR QL STRIP: ABNORMAL
KETONES UR QL STRIP: ABNORMAL
LEUKOCYTE ESTERASE URINE, POC: ABNORMAL
NITRITE, POC UA: ABNORMAL
PH, POC UA: 7
PROTEIN, POC: ABNORMAL
SPECIFIC GRAVITY, POC UA: 1
UROBILINOGEN, POC UA: ABNORMAL

## 2020-09-29 PROCEDURE — 81002 URINALYSIS NONAUTO W/O SCOPE: CPT | Mod: PBBFAC,PO | Performed by: NURSE PRACTITIONER

## 2020-09-29 PROCEDURE — 99213 OFFICE O/P EST LOW 20 MIN: CPT | Mod: PBBFAC,PO | Performed by: NURSE PRACTITIONER

## 2020-09-29 PROCEDURE — 99213 OFFICE O/P EST LOW 20 MIN: CPT | Mod: S$PBB,,, | Performed by: NURSE PRACTITIONER

## 2020-09-29 PROCEDURE — 99999 PR PBB SHADOW E&M-EST. PATIENT-LVL III: ICD-10-PCS | Mod: PBBFAC,,, | Performed by: NURSE PRACTITIONER

## 2020-09-29 PROCEDURE — 99999 PR PBB SHADOW E&M-EST. PATIENT-LVL III: CPT | Mod: PBBFAC,,, | Performed by: NURSE PRACTITIONER

## 2020-09-29 PROCEDURE — 99213 PR OFFICE/OUTPT VISIT, EST, LEVL III, 20-29 MIN: ICD-10-PCS | Mod: S$PBB,,, | Performed by: NURSE PRACTITIONER

## 2020-09-29 RX ORDER — CONJUGATED ESTROGENS 0.62 MG/G
1 CREAM VAGINAL
Qty: 1 APPLICATOR | Refills: 5 | Status: SHIPPED | OUTPATIENT
Start: 2020-10-01 | End: 2022-01-18 | Stop reason: SDUPTHER

## 2020-09-29 NOTE — PROGRESS NOTES
Subjective:       Patient ID: Josie Allen is a 75 y.o. female.    Chief Complaint: Pessary Check    HPI  Josie Allen is a 75 y.o. female who presents today for routine pessary maintenance.  She feels that the pessary has been doing well for her.  She has noticed that at night sometimes when her bladder is very full, she will leak on the way to the bathroom.  This is not happening enough that she wants to pursue any medication at this time, but she is trying to make note of everything.  She denies any vaginal discharge or bleeding.  She does need a refill on her premarin, but it costs her $300 at her pharmacy.  We will send medication to a compounding pharmacy to see it this is more cost effective for her.  She denies any other acute complaints/concerns and is ready to proceed with the pessary.    Review of Systems   Constitutional: Negative for activity change, fever and unexpected weight change.   HENT: Negative for hearing loss.    Eyes: Negative for visual disturbance.   Respiratory: Negative for shortness of breath and wheezing.    Cardiovascular: Negative for chest pain, palpitations and leg swelling.   Gastrointestinal: Negative for abdominal pain, constipation and diarrhea.   Genitourinary: Positive for frequency and urgency. Negative for dyspareunia, dysuria, vaginal bleeding and vaginal discharge.   Musculoskeletal: Negative for gait problem and neck pain.   Skin: Negative for rash and wound.   Allergic/Immunologic: Negative for immunocompromised state.   Neurological: Negative for tremors, speech difficulty and weakness.   Hematological: Does not bruise/bleed easily.   Psychiatric/Behavioral: Negative for agitation and confusion.       Objective:      Physical Exam  Constitutional:       General: She is not in acute distress.     Appearance: She is well-developed.   HENT:      Head: Normocephalic and atraumatic.   Neck:      Musculoskeletal: Neck supple.      Thyroid: No thyromegaly.    Pulmonary:      Effort: Pulmonary effort is normal. No respiratory distress.   Abdominal:      Palpations: Abdomen is soft.      Tenderness: There is no abdominal tenderness.      Hernia: No hernia is present.   Musculoskeletal: Normal range of motion.   Skin:     General: Skin is warm and dry.      Findings: No rash.   Neurological:      Mental Status: She is alert and oriented to person, place, and time.   Psychiatric:         Behavior: Behavior normal.       Pelvic Exam:  V: No lesions. No palpable nodes.   Va: no discharge or bleeding.  Good length.  Dominant midline cystocele Ba=0, mild diffuse relaxation = -1  Meatus:No caruncle or stenosis  Urethra: Non tender. No suburethral masses.  Cx/Cuff: Normal   Uterus: surgically absent  Ad: No mass or tenderness.  Levators :Symmetrical. Normal tone. Non tender.  BL: Non tender  RV: No hemorrhoids.      Assessment:       1. Urinary frequency    2. Urge incontinence of urine    3. Cystocele, midline    4. Vaginal vault prolapse    5. Pessary maintenance    6. Atrophic vaginitis          Procedure note- #4 ring pessary removed and cleaned with soap and water.  After betadine irrigation of the vagina, #4 ring pessary was reinserted into the vagina.  Pt ambulated in the room and denies any discomfort.  NP reminded pt that the first 24-48 hours are the most likely time for the pessary to fall out and to monitor the toilet before flushing.  Pt verbalized understanding.      Plan:       Urinary frequency- monitor at this time.  Pt doesn't wish to try anything for the incontinence.    Urge incontinence of urine- as noted above    Cystocele, midline- continue with ring pessary    Vaginal vault prolapse- as noted above    Pessary maintenance- as noted above    Atrophic vaginitis- continue premarin.    RTC 3 months

## 2021-01-05 ENCOUNTER — OFFICE VISIT (OUTPATIENT)
Dept: UROGYNECOLOGY | Facility: CLINIC | Age: 76
End: 2021-01-05
Payer: MEDICARE

## 2021-01-05 VITALS
DIASTOLIC BLOOD PRESSURE: 79 MMHG | SYSTOLIC BLOOD PRESSURE: 147 MMHG | HEIGHT: 66 IN | HEART RATE: 76 BPM | BODY MASS INDEX: 31.96 KG/M2 | WEIGHT: 198.88 LBS

## 2021-01-05 DIAGNOSIS — N81.11 CYSTOCELE, MIDLINE: ICD-10-CM

## 2021-01-05 DIAGNOSIS — N81.9 VAGINAL VAULT PROLAPSE: ICD-10-CM

## 2021-01-05 DIAGNOSIS — N95.2 ATROPHIC VAGINITIS: ICD-10-CM

## 2021-01-05 DIAGNOSIS — Z46.89 PESSARY MAINTENANCE: ICD-10-CM

## 2021-01-05 DIAGNOSIS — N39.41 URGE INCONTINENCE OF URINE: ICD-10-CM

## 2021-01-05 DIAGNOSIS — R35.0 URINARY FREQUENCY: Primary | ICD-10-CM

## 2021-01-05 LAB
BILIRUB SERPL-MCNC: ABNORMAL MG/DL
BLOOD URINE, POC: ABNORMAL
CLARITY, POC UA: ABNORMAL
COLOR, POC UA: COLORLESS
GLUCOSE UR QL STRIP: ABNORMAL
KETONES UR QL STRIP: ABNORMAL
LEUKOCYTE ESTERASE URINE, POC: ABNORMAL
NITRITE, POC UA: ABNORMAL
PH, POC UA: 6
PROTEIN, POC: ABNORMAL
SPECIFIC GRAVITY, POC UA: 1
UROBILINOGEN, POC UA: ABNORMAL

## 2021-01-05 PROCEDURE — 99213 OFFICE O/P EST LOW 20 MIN: CPT | Mod: S$PBB,,, | Performed by: NURSE PRACTITIONER

## 2021-01-05 PROCEDURE — 99213 OFFICE O/P EST LOW 20 MIN: CPT | Mod: PBBFAC,PO | Performed by: NURSE PRACTITIONER

## 2021-01-05 PROCEDURE — 99213 PR OFFICE/OUTPT VISIT, EST, LEVL III, 20-29 MIN: ICD-10-PCS | Mod: S$PBB,,, | Performed by: NURSE PRACTITIONER

## 2021-01-05 PROCEDURE — 99999 PR PBB SHADOW E&M-EST. PATIENT-LVL III: ICD-10-PCS | Mod: PBBFAC,,, | Performed by: NURSE PRACTITIONER

## 2021-01-05 PROCEDURE — 99999 PR PBB SHADOW E&M-EST. PATIENT-LVL III: CPT | Mod: PBBFAC,,, | Performed by: NURSE PRACTITIONER

## 2021-01-05 PROCEDURE — 81002 URINALYSIS NONAUTO W/O SCOPE: CPT | Mod: PBBFAC,PO | Performed by: NURSE PRACTITIONER

## 2021-01-05 RX ORDER — CETIRIZINE HYDROCHLORIDE 5 MG/1
5 TABLET ORAL
COMMUNITY
Start: 2020-10-06 | End: 2022-08-01 | Stop reason: SDUPTHER

## 2021-02-11 ENCOUNTER — IMMUNIZATION (OUTPATIENT)
Dept: PRIMARY CARE CLINIC | Facility: CLINIC | Age: 76
End: 2021-02-11
Payer: MEDICARE

## 2021-02-11 DIAGNOSIS — Z23 NEED FOR VACCINATION: Primary | ICD-10-CM

## 2021-02-11 PROCEDURE — 91300 COVID-19, MRNA, LNP-S, PF, 30 MCG/0.3 ML DOSE VACCINE: CPT | Mod: S$GLB,,, | Performed by: FAMILY MEDICINE

## 2021-02-11 PROCEDURE — 91300 COVID-19, MRNA, LNP-S, PF, 30 MCG/0.3 ML DOSE VACCINE: ICD-10-PCS | Mod: S$GLB,,, | Performed by: FAMILY MEDICINE

## 2021-02-11 PROCEDURE — 0001A COVID-19, MRNA, LNP-S, PF, 30 MCG/0.3 ML DOSE VACCINE: ICD-10-PCS | Mod: S$GLB,,, | Performed by: FAMILY MEDICINE

## 2021-02-11 PROCEDURE — 0001A COVID-19, MRNA, LNP-S, PF, 30 MCG/0.3 ML DOSE VACCINE: CPT | Mod: S$GLB,,, | Performed by: FAMILY MEDICINE

## 2021-03-04 ENCOUNTER — IMMUNIZATION (OUTPATIENT)
Dept: PRIMARY CARE CLINIC | Facility: CLINIC | Age: 76
End: 2021-03-04
Payer: MEDICARE

## 2021-03-04 DIAGNOSIS — Z23 NEED FOR VACCINATION: Primary | ICD-10-CM

## 2021-03-04 PROCEDURE — 0002A COVID-19, MRNA, LNP-S, PF, 30 MCG/0.3 ML DOSE VACCINE: ICD-10-PCS | Mod: CV19,S$GLB,, | Performed by: FAMILY MEDICINE

## 2021-03-04 PROCEDURE — 0002A COVID-19, MRNA, LNP-S, PF, 30 MCG/0.3 ML DOSE VACCINE: CPT | Mod: CV19,S$GLB,, | Performed by: FAMILY MEDICINE

## 2021-03-04 PROCEDURE — 91300 COVID-19, MRNA, LNP-S, PF, 30 MCG/0.3 ML DOSE VACCINE: ICD-10-PCS | Mod: S$GLB,,, | Performed by: FAMILY MEDICINE

## 2021-03-04 PROCEDURE — 91300 COVID-19, MRNA, LNP-S, PF, 30 MCG/0.3 ML DOSE VACCINE: CPT | Mod: S$GLB,,, | Performed by: FAMILY MEDICINE

## 2021-04-06 ENCOUNTER — OFFICE VISIT (OUTPATIENT)
Dept: UROGYNECOLOGY | Facility: CLINIC | Age: 76
End: 2021-04-06
Payer: MEDICARE

## 2021-04-06 VITALS
BODY MASS INDEX: 31.96 KG/M2 | HEIGHT: 66 IN | DIASTOLIC BLOOD PRESSURE: 87 MMHG | HEART RATE: 72 BPM | SYSTOLIC BLOOD PRESSURE: 148 MMHG | WEIGHT: 198.88 LBS

## 2021-04-06 DIAGNOSIS — Z46.89 PESSARY MAINTENANCE: ICD-10-CM

## 2021-04-06 DIAGNOSIS — N81.11 CYSTOCELE, MIDLINE: ICD-10-CM

## 2021-04-06 DIAGNOSIS — N81.9 VAGINAL VAULT PROLAPSE: ICD-10-CM

## 2021-04-06 DIAGNOSIS — R35.0 URINARY FREQUENCY: Primary | ICD-10-CM

## 2021-04-06 DIAGNOSIS — N95.2 ATROPHIC VAGINITIS: ICD-10-CM

## 2021-04-06 DIAGNOSIS — N39.41 URGE INCONTINENCE OF URINE: ICD-10-CM

## 2021-04-06 LAB
BILIRUB SERPL-MCNC: NORMAL MG/DL
BLOOD URINE, POC: NORMAL
CLARITY, POC UA: NORMAL
COLOR, POC UA: NORMAL
GLUCOSE UR QL STRIP: NORMAL
KETONES UR QL STRIP: NORMAL
LEUKOCYTE ESTERASE URINE, POC: NORMAL
NITRITE, POC UA: NORMAL
PH, POC UA: 6
PROTEIN, POC: NORMAL
SPECIFIC GRAVITY, POC UA: 1
UROBILINOGEN, POC UA: NORMAL

## 2021-04-06 PROCEDURE — 99213 OFFICE O/P EST LOW 20 MIN: CPT | Mod: PBBFAC,PO | Performed by: NURSE PRACTITIONER

## 2021-04-06 PROCEDURE — 99999 PR PBB SHADOW E&M-EST. PATIENT-LVL III: ICD-10-PCS | Mod: PBBFAC,,, | Performed by: NURSE PRACTITIONER

## 2021-04-06 PROCEDURE — 99999 PR PBB SHADOW E&M-EST. PATIENT-LVL III: CPT | Mod: PBBFAC,,, | Performed by: NURSE PRACTITIONER

## 2021-04-06 PROCEDURE — 99213 OFFICE O/P EST LOW 20 MIN: CPT | Mod: S$PBB,,, | Performed by: NURSE PRACTITIONER

## 2021-04-06 PROCEDURE — 81002 URINALYSIS NONAUTO W/O SCOPE: CPT | Mod: PBBFAC,PO | Performed by: NURSE PRACTITIONER

## 2021-04-06 PROCEDURE — 99213 PR OFFICE/OUTPT VISIT, EST, LEVL III, 20-29 MIN: ICD-10-PCS | Mod: S$PBB,,, | Performed by: NURSE PRACTITIONER

## 2021-04-06 RX ORDER — OMEPRAZOLE 20 MG/1
CAPSULE, DELAYED RELEASE ORAL
COMMUNITY
End: 2023-08-08

## 2021-07-06 ENCOUNTER — OFFICE VISIT (OUTPATIENT)
Dept: UROGYNECOLOGY | Facility: CLINIC | Age: 76
End: 2021-07-06
Payer: MEDICARE

## 2021-07-06 VITALS
DIASTOLIC BLOOD PRESSURE: 80 MMHG | SYSTOLIC BLOOD PRESSURE: 155 MMHG | HEIGHT: 66 IN | BODY MASS INDEX: 31.96 KG/M2 | WEIGHT: 198.88 LBS | HEART RATE: 73 BPM

## 2021-07-06 DIAGNOSIS — Z46.89 PESSARY MAINTENANCE: ICD-10-CM

## 2021-07-06 DIAGNOSIS — L30.9 DERMATITIS: ICD-10-CM

## 2021-07-06 DIAGNOSIS — R35.0 URINARY FREQUENCY: Primary | ICD-10-CM

## 2021-07-06 DIAGNOSIS — N81.9 VAGINAL VAULT PROLAPSE: ICD-10-CM

## 2021-07-06 DIAGNOSIS — N39.41 URGE INCONTINENCE OF URINE: ICD-10-CM

## 2021-07-06 DIAGNOSIS — L02.224 BOIL OF GROIN: ICD-10-CM

## 2021-07-06 DIAGNOSIS — N81.11 CYSTOCELE, MIDLINE: ICD-10-CM

## 2021-07-06 DIAGNOSIS — N95.2 ATROPHIC VAGINITIS: ICD-10-CM

## 2021-07-06 LAB
BILIRUB SERPL-MCNC: NORMAL MG/DL
BLOOD URINE, POC: NORMAL
CLARITY, POC UA: CLEAR
COLOR, POC UA: YELLOW
GLUCOSE UR QL STRIP: NORMAL
KETONES UR QL STRIP: NORMAL
LEUKOCYTE ESTERASE URINE, POC: NORMAL
NITRITE, POC UA: NORMAL
PH, POC UA: 7
PROTEIN, POC: NORMAL
SPECIFIC GRAVITY, POC UA: 1
UROBILINOGEN, POC UA: NORMAL

## 2021-07-06 PROCEDURE — 99999 PR PBB SHADOW E&M-EST. PATIENT-LVL III: CPT | Mod: PBBFAC,,, | Performed by: NURSE PRACTITIONER

## 2021-07-06 PROCEDURE — 99213 PR OFFICE/OUTPT VISIT, EST, LEVL III, 20-29 MIN: ICD-10-PCS | Mod: S$PBB,,, | Performed by: NURSE PRACTITIONER

## 2021-07-06 PROCEDURE — 99999 PR PBB SHADOW E&M-EST. PATIENT-LVL III: ICD-10-PCS | Mod: PBBFAC,,, | Performed by: NURSE PRACTITIONER

## 2021-07-06 PROCEDURE — 99213 OFFICE O/P EST LOW 20 MIN: CPT | Mod: PBBFAC,PO | Performed by: NURSE PRACTITIONER

## 2021-07-06 PROCEDURE — 99213 OFFICE O/P EST LOW 20 MIN: CPT | Mod: S$PBB,,, | Performed by: NURSE PRACTITIONER

## 2021-07-06 PROCEDURE — 81002 URINALYSIS NONAUTO W/O SCOPE: CPT | Mod: PBBFAC,PO | Performed by: NURSE PRACTITIONER

## 2021-07-06 RX ORDER — MUPIROCIN 20 MG/G
OINTMENT TOPICAL 3 TIMES DAILY
Qty: 30 G | Refills: 1 | Status: SHIPPED | OUTPATIENT
Start: 2021-07-06 | End: 2022-08-01

## 2021-07-06 RX ORDER — EZETIMIBE 10 MG/1
10 TABLET ORAL DAILY
COMMUNITY
Start: 2021-06-04 | End: 2023-02-02 | Stop reason: SDUPTHER

## 2021-07-06 RX ORDER — NYSTATIN 100000 [USP'U]/G
POWDER TOPICAL 3 TIMES DAILY
Qty: 60 G | Refills: 3 | Status: SHIPPED | OUTPATIENT
Start: 2021-07-06 | End: 2023-01-12

## 2021-10-05 ENCOUNTER — OFFICE VISIT (OUTPATIENT)
Dept: UROGYNECOLOGY | Facility: CLINIC | Age: 76
End: 2021-10-05
Payer: MEDICARE

## 2021-10-05 VITALS
SYSTOLIC BLOOD PRESSURE: 156 MMHG | HEART RATE: 72 BPM | HEIGHT: 66 IN | BODY MASS INDEX: 34.98 KG/M2 | DIASTOLIC BLOOD PRESSURE: 88 MMHG | WEIGHT: 217.63 LBS

## 2021-10-05 DIAGNOSIS — N81.9 VAGINAL VAULT PROLAPSE: ICD-10-CM

## 2021-10-05 DIAGNOSIS — Z46.89 PESSARY MAINTENANCE: ICD-10-CM

## 2021-10-05 DIAGNOSIS — N39.41 URGE INCONTINENCE OF URINE: ICD-10-CM

## 2021-10-05 DIAGNOSIS — N81.11 CYSTOCELE, MIDLINE: ICD-10-CM

## 2021-10-05 DIAGNOSIS — R35.0 URINARY FREQUENCY: Primary | ICD-10-CM

## 2021-10-05 PROCEDURE — 99213 PR OFFICE/OUTPT VISIT, EST, LEVL III, 20-29 MIN: ICD-10-PCS | Mod: S$PBB,,, | Performed by: NURSE PRACTITIONER

## 2021-10-05 PROCEDURE — 99213 OFFICE O/P EST LOW 20 MIN: CPT | Mod: S$PBB,,, | Performed by: NURSE PRACTITIONER

## 2021-10-05 PROCEDURE — 99999 PR PBB SHADOW E&M-EST. PATIENT-LVL III: ICD-10-PCS | Mod: PBBFAC,,, | Performed by: NURSE PRACTITIONER

## 2021-10-05 PROCEDURE — 99213 OFFICE O/P EST LOW 20 MIN: CPT | Mod: PBBFAC,PO | Performed by: NURSE PRACTITIONER

## 2021-10-05 PROCEDURE — 99999 PR PBB SHADOW E&M-EST. PATIENT-LVL III: CPT | Mod: PBBFAC,,, | Performed by: NURSE PRACTITIONER

## 2022-01-06 ENCOUNTER — OFFICE VISIT (OUTPATIENT)
Dept: UROGYNECOLOGY | Facility: CLINIC | Age: 77
End: 2022-01-06
Payer: MEDICARE

## 2022-01-06 VITALS
SYSTOLIC BLOOD PRESSURE: 163 MMHG | HEIGHT: 66 IN | BODY MASS INDEX: 34.98 KG/M2 | DIASTOLIC BLOOD PRESSURE: 83 MMHG | HEART RATE: 75 BPM | WEIGHT: 217.63 LBS

## 2022-01-06 DIAGNOSIS — R35.0 URINARY FREQUENCY: Primary | ICD-10-CM

## 2022-01-06 DIAGNOSIS — N81.9 VAGINAL VAULT PROLAPSE: ICD-10-CM

## 2022-01-06 DIAGNOSIS — Z46.89 PESSARY MAINTENANCE: ICD-10-CM

## 2022-01-06 DIAGNOSIS — N81.11 CYSTOCELE, MIDLINE: ICD-10-CM

## 2022-01-06 DIAGNOSIS — N39.41 URGE INCONTINENCE OF URINE: ICD-10-CM

## 2022-01-06 PROCEDURE — 99999 PR PBB SHADOW E&M-EST. PATIENT-LVL III: ICD-10-PCS | Mod: PBBFAC,,, | Performed by: NURSE PRACTITIONER

## 2022-01-06 PROCEDURE — 99213 OFFICE O/P EST LOW 20 MIN: CPT | Mod: PBBFAC,PO | Performed by: NURSE PRACTITIONER

## 2022-01-06 PROCEDURE — 99213 OFFICE O/P EST LOW 20 MIN: CPT | Mod: S$PBB,,, | Performed by: NURSE PRACTITIONER

## 2022-01-06 PROCEDURE — 81002 URINALYSIS NONAUTO W/O SCOPE: CPT | Mod: PBBFAC,PO | Performed by: NURSE PRACTITIONER

## 2022-01-06 PROCEDURE — 99213 PR OFFICE/OUTPT VISIT, EST, LEVL III, 20-29 MIN: ICD-10-PCS | Mod: S$PBB,,, | Performed by: NURSE PRACTITIONER

## 2022-01-06 PROCEDURE — 99999 PR PBB SHADOW E&M-EST. PATIENT-LVL III: CPT | Mod: PBBFAC,,, | Performed by: NURSE PRACTITIONER

## 2022-01-06 NOTE — PROGRESS NOTES
Subjective:       Patient ID: Josie Allen is a 76 y.o. female.    Chief Complaint: pessary maintenance    HPI  Josie Allen is a 76 y.o. female who presents today for routine pessary maintenance.  She feels that she has been doing well with the pessary.  She denies any vaginal discharge/bleeding.  She denies any bulging around the pessary.  She denies any urinary complaints/concerns.  She is ready to proceed with the pessary.    Review of Systems   Constitutional: Negative for activity change, fever and unexpected weight change.   HENT: Negative for hearing loss.    Eyes: Negative for visual disturbance.   Respiratory: Negative for shortness of breath and wheezing.    Cardiovascular: Negative for chest pain, palpitations and leg swelling.   Gastrointestinal: Negative for abdominal pain, constipation and diarrhea.   Genitourinary: Positive for frequency. Negative for dyspareunia, dysuria, urgency, vaginal bleeding and vaginal discharge.   Musculoskeletal: Negative for gait problem and neck pain.   Skin: Negative for rash and wound.   Allergic/Immunologic: Negative for immunocompromised state.   Neurological: Negative for tremors, speech difficulty and weakness.   Hematological: Does not bruise/bleed easily.   Psychiatric/Behavioral: Negative for agitation and confusion.       Objective:      Physical Exam  Constitutional:       General: She is not in acute distress.     Appearance: She is well-developed and well-nourished.   HENT:      Head: Normocephalic and atraumatic.   Neck:      Thyroid: No thyromegaly.   Pulmonary:      Effort: Pulmonary effort is normal. No respiratory distress.   Abdominal:      Palpations: Abdomen is soft.      Tenderness: There is no abdominal tenderness.      Hernia: No hernia is present.   Musculoskeletal:         General: Normal range of motion.      Cervical back: Neck supple.   Skin:     General: Skin is warm and dry.      Findings: No rash.   Neurological:      Mental  Status: She is alert and oriented to person, place, and time.   Psychiatric:         Mood and Affect: Mood and affect normal.         Behavior: Behavior normal.       Pelvic Exam:  V: No lesions. No palpable nodes.   Va: no discharge or bleeding.  Mild irritation noted near the 12 o'clock position.  Dominant midline cystocele Ba=0, vaginal vault prolapse to -3 position  Meatus:No caruncle or stenosis  Urethra: Non tender. No suburethral masses.  Cx/Cuff: Normal   Uterus: surgically absent  Ad: No mass or tenderness.  Levators :Symmetrical. Normal tone. Non tender.  BL: Non tender  RV: No hemorrhoids.      Assessment:       1. Urinary frequency    2. Urge incontinence of urine    3. Cystocele, midline    4. Vaginal vault prolapse    5. Pessary maintenance          Procedure note- #4 ring pessary removed with forceps and cleaned with soap and water.  After betadine irrigation of the vagina, #4 ring pessary was reinserted into the vagina.  Pt ambulated in the room and denies any discomfort.  NP reminded pt that the first 24-48 hours are the most likely time for the pessary to fall out and to monitor the toilet before flushing.  Pt verbalized understanding.      Plan:       Urinary frequency- monitor  -     POCT URINE DIPSTICK WITHOUT MICROSCOPE    Urge incontinence of urine- monitor    Cystocele, midline- continue with ring pessary    Vaginal vault prolapse- continue with ring pessary    Pessary maintenance- continue with ring pessary    RTC 3 months

## 2022-01-18 DIAGNOSIS — N95.2 ATROPHIC VAGINITIS: ICD-10-CM

## 2022-01-18 RX ORDER — CONJUGATED ESTROGENS 0.62 MG/G
1 CREAM VAGINAL
Qty: 1 APPLICATOR | Refills: 5 | Status: SHIPPED | OUTPATIENT
Start: 2022-01-20 | End: 2022-01-20 | Stop reason: SDUPTHER

## 2022-01-18 NOTE — TELEPHONE ENCOUNTER
----- Message from Annabel Panda sent at 1/18/2022 10:24 AM CST -----  Type:  Pharmacy Calling to Clarify an RX    Name of Caller:  Gretta  Pharmacy Name:  [facility]   Prescription Name: Estriol Vaginal Cream  What do they need to clarify?:  Refill request verbal  Best Call Back Number:  [phone]  Additional Information:  Please call and advise

## 2022-01-20 DIAGNOSIS — N95.2 ATROPHIC VAGINITIS: ICD-10-CM

## 2022-01-20 RX ORDER — CONJUGATED ESTROGENS 0.62 MG/G
1 CREAM VAGINAL
Qty: 1 APPLICATOR | Refills: 5 | Status: SHIPPED | OUTPATIENT
Start: 2022-01-20 | End: 2022-08-01

## 2022-01-20 NOTE — TELEPHONE ENCOUNTER
Does not want  rx filled at [facility] send to [facility]

## 2022-01-20 NOTE — TELEPHONE ENCOUNTER
----- Message from Leah Crystal sent at 1/20/2022 10:38 AM CST -----  Type: Needs Medical Advice  Who Called:  Patient  Pharmacy name and phone #:   [facility] - [place], [place] - [address]  [address] [address]  Phone: [phone] Fax: [phone]  Best Call Back Number: [phone]  Additional Information: Calling to request that her prescription be sent to the above listed pharmacy instead of [facility]

## 2022-04-07 ENCOUNTER — OFFICE VISIT (OUTPATIENT)
Dept: UROGYNECOLOGY | Facility: CLINIC | Age: 77
End: 2022-04-07
Payer: MEDICARE

## 2022-04-07 VITALS
DIASTOLIC BLOOD PRESSURE: 78 MMHG | HEART RATE: 81 BPM | HEIGHT: 66 IN | BODY MASS INDEX: 34.98 KG/M2 | SYSTOLIC BLOOD PRESSURE: 143 MMHG | WEIGHT: 217.63 LBS

## 2022-04-07 DIAGNOSIS — N81.11 CYSTOCELE, MIDLINE: ICD-10-CM

## 2022-04-07 DIAGNOSIS — Z46.89 PESSARY MAINTENANCE: ICD-10-CM

## 2022-04-07 DIAGNOSIS — R35.0 URINARY FREQUENCY: Primary | ICD-10-CM

## 2022-04-07 DIAGNOSIS — N39.41 URGE INCONTINENCE OF URINE: ICD-10-CM

## 2022-04-07 DIAGNOSIS — N81.9 VAGINAL VAULT PROLAPSE: ICD-10-CM

## 2022-04-07 LAB
BILIRUB SERPL-MCNC: NEGATIVE MG/DL
BLOOD URINE, POC: NEGATIVE
CLARITY, POC UA: CLEAR
COLOR, POC UA: YELLOW
GLUCOSE UR QL STRIP: NEGATIVE
KETONES UR QL STRIP: NEGATIVE
LEUKOCYTE ESTERASE URINE, POC: ABNORMAL
NITRITE, POC UA: NEGATIVE
PH, POC UA: 7
PROTEIN, POC: NEGATIVE
SPECIFIC GRAVITY, POC UA: 1
UROBILINOGEN, POC UA: NEGATIVE

## 2022-04-07 PROCEDURE — 99213 PR OFFICE/OUTPT VISIT, EST, LEVL III, 20-29 MIN: ICD-10-PCS | Mod: S$PBB,,, | Performed by: NURSE PRACTITIONER

## 2022-04-07 PROCEDURE — 99999 PR PBB SHADOW E&M-EST. PATIENT-LVL III: ICD-10-PCS | Mod: PBBFAC,,, | Performed by: NURSE PRACTITIONER

## 2022-04-07 PROCEDURE — 99213 OFFICE O/P EST LOW 20 MIN: CPT | Mod: PBBFAC,PO | Performed by: NURSE PRACTITIONER

## 2022-04-07 PROCEDURE — 99999 PR PBB SHADOW E&M-EST. PATIENT-LVL III: CPT | Mod: PBBFAC,,, | Performed by: NURSE PRACTITIONER

## 2022-04-07 PROCEDURE — 81002 URINALYSIS NONAUTO W/O SCOPE: CPT | Mod: PBBFAC,PO | Performed by: NURSE PRACTITIONER

## 2022-04-07 PROCEDURE — 99213 OFFICE O/P EST LOW 20 MIN: CPT | Mod: S$PBB,,, | Performed by: NURSE PRACTITIONER

## 2022-04-07 NOTE — PROGRESS NOTES
Subjective:       Patient ID: Josie Allen is a 76 y.o. female.    Chief Complaint: pessary maintenance    HPI  Josie Allen is a 76 y.o. female who presents today for routine pessary maintenance.  She has been using the ring pessary for awhile and feels that it works well for her.  She has some vaginal discharge at times, but she uses the trimosan gel and this helps.  She does have some incontinence at night, but she does also drink throughout the night.  Overall, she feels that she is doing OK and is ready to proceed with the pessary.    Review of Systems   Constitutional: Negative for activity change, fever and unexpected weight change.   HENT: Negative for hearing loss.    Eyes: Negative for visual disturbance.   Respiratory: Negative for shortness of breath and wheezing.    Cardiovascular: Negative for chest pain, palpitations and leg swelling.   Gastrointestinal: Negative for abdominal pain, constipation and diarrhea.   Genitourinary: Positive for frequency. Negative for dyspareunia, dysuria, urgency, vaginal bleeding and vaginal discharge.   Musculoskeletal: Negative for gait problem and neck pain.   Skin: Negative for rash and wound.   Allergic/Immunologic: Negative for immunocompromised state.   Neurological: Negative for tremors, speech difficulty and weakness.   Hematological: Does not bruise/bleed easily.   Psychiatric/Behavioral: Negative for agitation and confusion.       Objective:      Physical Exam  Constitutional:       General: She is not in acute distress.     Appearance: She is well-developed.   HENT:      Head: Normocephalic and atraumatic.   Neck:      Thyroid: No thyromegaly.   Pulmonary:      Effort: Pulmonary effort is normal. No respiratory distress.   Abdominal:      Palpations: Abdomen is soft.      Tenderness: There is no abdominal tenderness.      Hernia: No hernia is present.   Musculoskeletal:         General: Normal range of motion.      Cervical back: Neck supple.    Skin:     General: Skin is warm and dry.      Findings: No rash.   Neurological:      Mental Status: She is alert and oriented to person, place, and time.   Psychiatric:         Behavior: Behavior normal.       Pelvic Exam:  V: No lesions. No palpable nodes.   Va: no discharge or bleeding.  Minor irritation noted near the 12 o'clock position.  Dominant midline cystocele Ba=0, vaginal vault prolapse to -2 position  Meatus:No caruncle or stenosis  Urethra: Non tender. No suburethral masses.  Cx/Cuff: Normal   Uterus: surgically absent  Ad: No mass or tenderness.  Levators :Symmetrical. Normal tone. Non tender.  BL: Non tender  RV: No hemorrhoids.      Assessment:       1. Urinary frequency    2. Urge incontinence of urine    3. Cystocele, midline    4. Vaginal vault prolapse    5. Pessary maintenance          Procedure note- #4 ring pessary removed with forceps and cleaned with soap and water.  After betadine irrigation of the vagina, #4 ring pessary was reinserted into the vagina.  Pt ambulated in the room and denies any discomfort.  NP reminded pt that the first 24-48 hours are the most likely time for the pessary to fall out and to monitor the toilet before flushing.  Pt verbalized understanding.      Plan:       Urinary frequency- monitor  -     POCT urine dipstick without microscope    Urge incontinence of urine- monitor    Cystocele, midline- continue with ring pessary    Vaginal vault prolapse- continue with ring pessary    Pessary maintenance- as noted above    RTC 3 months

## 2022-07-07 ENCOUNTER — OFFICE VISIT (OUTPATIENT)
Dept: UROGYNECOLOGY | Facility: CLINIC | Age: 77
End: 2022-07-07
Payer: MEDICARE

## 2022-07-07 VITALS
SYSTOLIC BLOOD PRESSURE: 133 MMHG | HEIGHT: 66 IN | HEART RATE: 79 BPM | WEIGHT: 217.63 LBS | DIASTOLIC BLOOD PRESSURE: 81 MMHG | BODY MASS INDEX: 34.98 KG/M2

## 2022-07-07 DIAGNOSIS — Z46.89 PESSARY MAINTENANCE: ICD-10-CM

## 2022-07-07 DIAGNOSIS — N81.11 CYSTOCELE, MIDLINE: ICD-10-CM

## 2022-07-07 DIAGNOSIS — N81.9 VAGINAL VAULT PROLAPSE: ICD-10-CM

## 2022-07-07 DIAGNOSIS — N39.41 URGE INCONTINENCE OF URINE: ICD-10-CM

## 2022-07-07 DIAGNOSIS — R35.0 URINARY FREQUENCY: Primary | ICD-10-CM

## 2022-07-07 LAB
BILIRUB SERPL-MCNC: ABNORMAL MG/DL
BLOOD URINE, POC: ABNORMAL
CLARITY, POC UA: CLEAR
COLOR, POC UA: YELLOW
GLUCOSE UR QL STRIP: ABNORMAL
KETONES UR QL STRIP: ABNORMAL
LEUKOCYTE ESTERASE URINE, POC: ABNORMAL
NITRITE, POC UA: ABNORMAL
PH, POC UA: 5
PROTEIN, POC: ABNORMAL
SPECIFIC GRAVITY, POC UA: 1
UROBILINOGEN, POC UA: ABNORMAL

## 2022-07-07 PROCEDURE — 99999 PR PBB SHADOW E&M-EST. PATIENT-LVL III: ICD-10-PCS | Mod: PBBFAC,,, | Performed by: NURSE PRACTITIONER

## 2022-07-07 PROCEDURE — 99213 PR OFFICE/OUTPT VISIT, EST, LEVL III, 20-29 MIN: ICD-10-PCS | Mod: S$PBB,,, | Performed by: NURSE PRACTITIONER

## 2022-07-07 PROCEDURE — 99213 OFFICE O/P EST LOW 20 MIN: CPT | Mod: PBBFAC,PO | Performed by: NURSE PRACTITIONER

## 2022-07-07 PROCEDURE — 81002 URINALYSIS NONAUTO W/O SCOPE: CPT | Mod: PBBFAC,PO | Performed by: NURSE PRACTITIONER

## 2022-07-07 PROCEDURE — 99213 OFFICE O/P EST LOW 20 MIN: CPT | Mod: S$PBB,,, | Performed by: NURSE PRACTITIONER

## 2022-07-07 PROCEDURE — 99999 PR PBB SHADOW E&M-EST. PATIENT-LVL III: CPT | Mod: PBBFAC,,, | Performed by: NURSE PRACTITIONER

## 2022-07-07 NOTE — PROGRESS NOTES
Subjective:       Patient ID: Josie Allen is a 76 y.o. female.    Chief Complaint: pessary maintenance.    HPI  Josie Allen is a 76 y.o. female who presents today for routine pessary maintenance.  She has been using the ring pessary for awhile and feels that she is doing well in regards to the pessary.  She does have some occasional discharge, but she uses the trimosan and estrace cream and feels that between the two, this is minimal.  She denies any vaginal bleeding or bulging around the pessary.  She has been having more problems with incontinence at night.  She is getting up about 3 times at night and is leaking every time she is getting up.  She does not drink fluids throughout the night, but she has not tried stopping fluids 2 hours prior to laying down.  She doesn't really want to do any medication for her bladder, but is a little frustrated at her nocturia.  She denies any other acute complaints/concerns at this time and is ready to proceed with the pessary.    Review of Systems   Constitutional: Negative for activity change, fever and unexpected weight change.   HENT: Negative for hearing loss.    Eyes: Negative for visual disturbance.   Respiratory: Negative for shortness of breath and wheezing.    Cardiovascular: Negative for chest pain, palpitations and leg swelling.   Gastrointestinal: Negative for abdominal pain, constipation and diarrhea.   Genitourinary: Positive for frequency and urgency. Negative for dyspareunia, dysuria, vaginal bleeding and vaginal discharge.   Musculoskeletal: Negative for gait problem and neck pain.   Skin: Negative for rash and wound.   Allergic/Immunologic: Negative for immunocompromised state.   Neurological: Negative for tremors, speech difficulty and weakness.   Hematological: Does not bruise/bleed easily.   Psychiatric/Behavioral: Negative for agitation and confusion.       Objective:      Physical Exam  Constitutional:       General: She is not in acute  distress.     Appearance: She is well-developed.   HENT:      Head: Normocephalic and atraumatic.   Neck:      Thyroid: No thyromegaly.   Pulmonary:      Effort: Pulmonary effort is normal. No respiratory distress.   Abdominal:      Palpations: Abdomen is soft.      Tenderness: There is no abdominal tenderness.      Hernia: No hernia is present.   Musculoskeletal:         General: Normal range of motion.      Cervical back: Neck supple.   Skin:     General: Skin is warm and dry.      Findings: No rash.   Neurological:      Mental Status: She is alert and oriented to person, place, and time.   Psychiatric:         Behavior: Behavior normal.       Pelvic Exam:  V: No lesions. No palpable nodes.   Va: small amount of thin white discharge.  No bleeding noted.  Good length.  Dominant midline cystocele Ba=0, vaginal vault prolapse to -3 position  Meatus:No caruncle or stenosis  Urethra: Non tender. No suburethral masses.  Cx/Cuff: Normal   Uterus: surgically absent  Ad: No mass or tenderness.  Levators :Symmetrical. Normal tone. Non tender.  BL: Non tender  RV: No hemorrhoids.      Assessment:       1. Urinary frequency    2. Urge incontinence of urine    3. Cystocele, midline    4. Vaginal vault prolapse    5. Pessary maintenance          Procedure note- #4 ring pessary removed with forceps and cleaned with soap and water.  After betadine irrigation of the vagina, #4 ring pessary was reinserted into the vagina.  Pt ambulated in the room and denies any discomfort.  NP reminded pt that the first 24-48 hours are the most likely time for the pessary to fall out and to monitor the toilet before flushing.  Pt verbalized understanding.      Plan:       Urinary frequency- monitor  -     POCT URINE DIPSTICK WITHOUT MICROSCOPE    Urge incontinence of urine- discussed lifestyle modifications like stopping fluids 2 hours prior to bedtime.  Pt does not wish to take any medication at this time.    Cystocele, midline- continue with  ring pessary    Vaginal vault prolapse- as noted above    Pessary maintenance- as noted above    RTC 3 months

## 2022-08-01 ENCOUNTER — OFFICE VISIT (OUTPATIENT)
Dept: FAMILY MEDICINE | Facility: CLINIC | Age: 77
End: 2022-08-01
Payer: MEDICARE

## 2022-08-01 VITALS
BODY MASS INDEX: 31.99 KG/M2 | HEIGHT: 66 IN | DIASTOLIC BLOOD PRESSURE: 92 MMHG | TEMPERATURE: 98 F | WEIGHT: 199.06 LBS | SYSTOLIC BLOOD PRESSURE: 144 MMHG | OXYGEN SATURATION: 98 % | RESPIRATION RATE: 16 BRPM | HEART RATE: 79 BPM

## 2022-08-01 DIAGNOSIS — H93.13 TINNITUS OF BOTH EARS: ICD-10-CM

## 2022-08-01 DIAGNOSIS — F41.9 ANXIETY: ICD-10-CM

## 2022-08-01 DIAGNOSIS — N99.3 VAGINAL VAULT PROLAPSE, POSTHYSTERECTOMY: ICD-10-CM

## 2022-08-01 DIAGNOSIS — K21.9 GASTROESOPHAGEAL REFLUX DISEASE WITHOUT ESOPHAGITIS: ICD-10-CM

## 2022-08-01 DIAGNOSIS — G89.29 CHRONIC PAIN OF RIGHT KNEE: ICD-10-CM

## 2022-08-01 DIAGNOSIS — F33.1 MODERATE EPISODE OF RECURRENT MAJOR DEPRESSIVE DISORDER: Primary | ICD-10-CM

## 2022-08-01 DIAGNOSIS — I34.1 MVP (MITRAL VALVE PROLAPSE): ICD-10-CM

## 2022-08-01 DIAGNOSIS — E78.00 HYPERCHOLESTEROLEMIA: ICD-10-CM

## 2022-08-01 DIAGNOSIS — M25.561 CHRONIC PAIN OF RIGHT KNEE: ICD-10-CM

## 2022-08-01 DIAGNOSIS — K59.09 CHRONIC CONSTIPATION: ICD-10-CM

## 2022-08-01 DIAGNOSIS — J30.1 SEASONAL ALLERGIC RHINITIS DUE TO POLLEN: ICD-10-CM

## 2022-08-01 DIAGNOSIS — N81.10 PROLAPSE OF ANTERIOR VAGINAL WALL: ICD-10-CM

## 2022-08-01 DIAGNOSIS — B37.2 CANDIDAL INTERTRIGO: ICD-10-CM

## 2022-08-01 PROCEDURE — 99999 PR PBB SHADOW E&M-EST. PATIENT-LVL IV: CPT | Mod: PBBFAC,,, | Performed by: STUDENT IN AN ORGANIZED HEALTH CARE EDUCATION/TRAINING PROGRAM

## 2022-08-01 PROCEDURE — 99204 OFFICE O/P NEW MOD 45 MIN: CPT | Mod: S$PBB,,, | Performed by: STUDENT IN AN ORGANIZED HEALTH CARE EDUCATION/TRAINING PROGRAM

## 2022-08-01 PROCEDURE — 99204 PR OFFICE/OUTPT VISIT, NEW, LEVL IV, 45-59 MIN: ICD-10-PCS | Mod: S$PBB,,, | Performed by: STUDENT IN AN ORGANIZED HEALTH CARE EDUCATION/TRAINING PROGRAM

## 2022-08-01 PROCEDURE — 99214 OFFICE O/P EST MOD 30 MIN: CPT | Mod: PBBFAC,PN | Performed by: STUDENT IN AN ORGANIZED HEALTH CARE EDUCATION/TRAINING PROGRAM

## 2022-08-01 PROCEDURE — 99999 PR PBB SHADOW E&M-EST. PATIENT-LVL IV: ICD-10-PCS | Mod: PBBFAC,,, | Performed by: STUDENT IN AN ORGANIZED HEALTH CARE EDUCATION/TRAINING PROGRAM

## 2022-08-01 RX ORDER — CETIRIZINE HYDROCHLORIDE 5 MG/1
5 TABLET ORAL DAILY
Qty: 90 TABLET | Refills: 3 | Status: SHIPPED | OUTPATIENT
Start: 2022-08-01 | End: 2023-02-02 | Stop reason: SDUPTHER

## 2022-08-01 RX ORDER — DICLOFENAC SODIUM 10 MG/G
2 GEL TOPICAL 3 TIMES DAILY
Qty: 450 G | Refills: 3 | Status: SHIPPED | OUTPATIENT
Start: 2022-08-01 | End: 2023-08-08 | Stop reason: SDUPTHER

## 2022-08-01 RX ORDER — FLUTICASONE PROPIONATE 50 MCG
SPRAY, SUSPENSION (ML) NASAL
COMMUNITY
Start: 2022-05-31 | End: 2023-02-02 | Stop reason: SDUPTHER

## 2022-08-01 RX ORDER — DICLOFENAC SODIUM 10 MG/G
2 GEL TOPICAL 3 TIMES DAILY
COMMUNITY
End: 2022-08-01 | Stop reason: SDUPTHER

## 2022-08-01 NOTE — ASSESSMENT & PLAN NOTE
Stable. Continue current medications and regular followup.  Hyperlipidemia Medications             ezetimibe (ZETIA) 10 mg tablet Take 10 mg by mouth once daily.

## 2022-08-01 NOTE — PATIENT INSTRUCTIONS

## 2022-08-01 NOTE — PROGRESS NOTES
Subjective:       Patient ID: Josie Allen is a 76 y.o. female.    Chief Complaint: Establish Care    Establishing Care  Patient Active Problem List:     Vaginal vault prolapse, posthysterectomy     Prolapse of anterior vaginal wall     Tinnitus     MVP (mitral valve prolapse)     Hypercholesterolemia     Gastroesophageal reflux disease     Depression     Anxiety     Allergic rhinitis    Current Outpatient Medications:  cetirizine (ZYRTEC) 5 MG tablet, Take 5 mg by mouth.  conjugated estrogens (PREMARIN) vaginal cream, Place 1 g vaginally twice a week.  cyclobenzaprine (FLEXERIL) 5 MG tablet,   escitalopram oxalate (LEXAPRO) 10 MG tablet,   ezetimibe (ZETIA) 10 mg tablet, Take 10 mg by mouth once daily.  mupirocin (BACTROBAN) 2 % ointment, Apply topically 3 (three) times daily.  nystatin (NYSTOP) powder, Apply topically 3 (three) times daily. for 7 days  omeprazole (PRILOSEC) 20 MG capsule, omeprazole 20 mg capsule,delayed release  oxyquinoline-sod.lauryl sulfat 0.025-0.01 % Gel, Place 1 applicatorful to the vaginal area nightly as needed  polyethylene glycol (GLYCOLAX) 17 gram PwPk, Take 17 g by mouth.    No current facility-administered medications for this visit.      Seeing obgyn for pessary and treatment for this    Knee injury/pain on right.   She had her feet up on the deck and had pain when getting down. She also pulled when putting her foot up on the sink when cutting her toenails.  This was 3 weeks ago.  Pain int he right knee posteriorly.  Using ice and heat and using nsaids and prn flexeril.  Seems to be getting better.     Review of Systems   Constitutional: Negative for activity change, appetite change, fatigue and unexpected weight change.   HENT: Negative for trouble swallowing.    Eyes: Negative for visual disturbance.   Respiratory: Negative for shortness of breath.    Cardiovascular: Negative for chest pain and leg swelling.   Gastrointestinal: Negative for abdominal pain, blood in stool,  change in bowel habit and change in bowel habit.   Endocrine: Negative for cold intolerance, heat intolerance, polydipsia and polyuria.   Genitourinary: Negative for dysuria and hematuria.   Musculoskeletal: Positive for arthralgias (right knee). Negative for back pain and gait problem.   Integumentary:  Negative for rash and wound.   Neurological: Negative for dizziness, weakness and headaches.   Psychiatric/Behavioral: Negative for dysphoric mood and sleep disturbance. The patient is not nervous/anxious.          Objective:      Physical Exam  Constitutional:       General: She is not in acute distress.     Appearance: Normal appearance. She is not ill-appearing.   Eyes:      Conjunctiva/sclera: Conjunctivae normal.   Neck:      Vascular: No carotid bruit.   Cardiovascular:      Rate and Rhythm: Normal rate and regular rhythm.      Pulses: Normal pulses.      Heart sounds: Normal heart sounds. No murmur heard.  Pulmonary:      Effort: Pulmonary effort is normal. No respiratory distress.      Breath sounds: Normal breath sounds. No wheezing.   Abdominal:      Palpations: Abdomen is soft.      Tenderness: There is no abdominal tenderness.   Musculoskeletal:      Right lower leg: No edema.      Left lower leg: No edema.   Lymphadenopathy:      Cervical: No cervical adenopathy.   Skin:     General: Skin is warm and dry.      Findings: No rash.   Neurological:      Mental Status: She is alert. Mental status is at baseline.      Gait: Gait normal.   Psychiatric:         Mood and Affect: Mood normal.         Behavior: Behavior normal.         Thought Content: Thought content normal.         Judgment: Judgment normal.         Assessment:       1. Moderate episode of recurrent major depressive disorder    2. Anxiety    3. Tinnitus of both ears    4. Seasonal allergic rhinitis due to pollen    5. MVP (mitral valve prolapse)    6. Hypercholesterolemia    7. Vaginal vault prolapse, posthysterectomy    8. Prolapse of anterior  vaginal wall    9. Gastroesophageal reflux disease without esophagitis    10. Chronic pain of right knee    11. Candidal intertrigo    12. Chronic constipation        Plan:       Problem List Items Addressed This Visit        Psychiatric    Depression - Primary     Stable on lexapro           Anxiety     Stable on lexapro              ENT    Tinnitus     Stable. Continue current medications and regular followup.             Allergic rhinitis     On zyrtec and flonase           Relevant Medications    cetirizine (ZYRTEC) 5 MG tablet       Cardiac/Vascular    MVP (mitral valve prolapse)    Hypercholesterolemia     Stable. Continue current medications and regular followup.  Hyperlipidemia Medications             ezetimibe (ZETIA) 10 mg tablet Take 10 mg by mouth once daily.                   Relevant Orders    Lipid Panel       Renal/    Vaginal vault prolapse, posthysterectomy     Seeing obgyn and has a pessary           Prolapse of anterior vaginal wall     Seeing obgyn              ID    Candidal intertrigo     Uses Nystatin powder              GI    Gastroesophageal reflux disease     Stable on prilosec. No dysphagia           Chronic constipation     Uses prn miralax              Orthopedic    Chronic pain of right knee    Relevant Medications    diclofenac sodium (VOLTAREN) 1 % Gel    Other Relevant Orders    Ambulatory referral/consult to Physical/Occupational Therapy

## 2022-08-02 ENCOUNTER — LAB VISIT (OUTPATIENT)
Dept: LAB | Facility: CLINIC | Age: 77
End: 2022-08-02
Payer: MEDICARE

## 2022-08-02 DIAGNOSIS — E78.00 HYPERCHOLESTEROLEMIA: ICD-10-CM

## 2022-08-02 LAB
CHOLEST SERPL-MCNC: 269 MG/DL (ref 120–199)
CHOLEST/HDLC SERPL: 4.2 {RATIO} (ref 2–5)
HDLC SERPL-MCNC: 64 MG/DL (ref 40–75)
HDLC SERPL: 23.8 % (ref 20–50)
LDLC SERPL CALC-MCNC: 185.2 MG/DL (ref 63–159)
NONHDLC SERPL-MCNC: 205 MG/DL
TRIGL SERPL-MCNC: 99 MG/DL (ref 30–150)

## 2022-08-02 PROCEDURE — 80061 LIPID PANEL: CPT | Performed by: STUDENT IN AN ORGANIZED HEALTH CARE EDUCATION/TRAINING PROGRAM

## 2022-08-16 ENCOUNTER — CLINICAL SUPPORT (OUTPATIENT)
Dept: REHABILITATION | Facility: HOSPITAL | Age: 77
End: 2022-08-16
Payer: MEDICARE

## 2022-08-16 DIAGNOSIS — G89.29 CHRONIC PAIN OF RIGHT KNEE: ICD-10-CM

## 2022-08-16 DIAGNOSIS — M62.89 MUSCLE TIGHTNESS: ICD-10-CM

## 2022-08-16 DIAGNOSIS — R26.89 DECREASED FUNCTIONAL MOBILITY: ICD-10-CM

## 2022-08-16 DIAGNOSIS — M25.561 CHRONIC PAIN OF RIGHT KNEE: ICD-10-CM

## 2022-08-16 DIAGNOSIS — M25.60 DECREASED RANGE OF MOTION: ICD-10-CM

## 2022-08-16 DIAGNOSIS — R53.1 DECREASED STRENGTH: ICD-10-CM

## 2022-08-16 PROCEDURE — 97110 THERAPEUTIC EXERCISES: CPT | Mod: PN

## 2022-08-16 PROCEDURE — 97161 PT EVAL LOW COMPLEX 20 MIN: CPT | Mod: PN

## 2022-08-16 NOTE — PLAN OF CARE
[facility]  Physical Therapy Initial Evaluation    Name: Josie Maxwell [facility] Number: [id number]    Therapy Diagnosis:   Encounter Diagnoses   Name Primary?    Chronic pain of right knee     Decreased strength     Muscle tightness     Decreased functional mobility     Decreased range of motion      Physician: Charito Paiz MD  Physician Orders: PT Eval and Treat  Medical Diagnosis from Referral: M25.561,G89.29 (ICD-10-CM) - Chronic pain of right knee   Evaluation Date: 8/16/2022  Authorization Period Expiration: 08/01/2023   Plan of Care Expiration: 11/30/2022    Progress Update: 9/16/2022  FOTO: 1 / 3   Visit # / Visits authorized: 1 / 1       PRECAUTIONS: Standard Precautions     Time In: 1030  Time Out: 1120  Total Appointment Time (timed & untimed codes): 50 minutes    SUBJECTIVE     Chief complaint:   B knee pain   (R>L)    History of current condition:  Pain started about 1 month ago.  Denies specific incident.  States that her knees pop and click daily but is not painful.  States that her R knee gives way 2-3x per day but has not fallen.   Denies swelling recently.  States that she has the majority of her pain first thing in the morning and it loosens up throughout the day.   Pain intensity and frequency has improved since onset.      Previous episode:  none    Aggravating Factors:  Getting out of the bed in the morning, stairs, sit to stand, squatting  Easing Factors:   Rest, icing, tyelenol    Imaging:  See epic.    Prior Therapy: N/A  Social History: Pt lives alone  Occupation: Pt is retired.  Prior Level of Function: Independent with all ADLs  Current Level of Function: 65% of PLOF    Pain:  Current 3 /10, worst 6 /10, best 3 /10   Description: Aching and Dull    Pts goals: Pt reported goal is to be able walk without pain.    _______________________________________________________  Medical History:   Past Medical History:   Diagnosis Date    Hyperlipidemia      IBS (irritable bowel syndrome)        Surgical History:   Josie Allen  has a past surgical history that includes Hysterectomy and Tonsillectomy.    Medications:   Josie has a current medication list which includes the following prescription(s): cetirizine, diclofenac sodium, escitalopram oxalate, ezetimibe, fluticasone propionate, nystatin, omeprazole, oxyquinoline-sod.lauryl sulfat, and polyethylene glycol.    Allergies:   Review of patient's allergies indicates:   Allergen Reactions    Penicillins Nausea And Vomiting    Sulfasalazine Nausea And Vomiting        OBJECTIVE   (x = not tested due to pain and/or inability to obtain test position)    RANGE OF MOTION:      Hip AROM/PROM Right  8/16/2022 Left  8/16/2022 Goal   Hip Flexion (120) wfl wfl 115     Hip IR (45) wfl wfl 40     Hip ER (45) wfl wfl 40         Knee AROM/PROM Right  8/16/2022 Left  8/16/2022 Goal   Hyper - Zero - Flexion 0-2-128 0-1-129 0-0-135        Ankle/Foot AROM/PROM Right  8/16/2022 Left  8/16/2022 Goal   Dorsiflexion (20) wfl wfl 15     Plantarflexion (50) wfl wfl 45     Great Toe Extension (35) wfl wfl 30         STRENGTH:    L/E MMT Right  8/16/2022 Goal   Hip Flexion  3+/5 5/5 B    Hip Extension  3+/5 5/5 B   Hip Abduction  3+/5 5/5 B   Hip IR 3+/5 5/5 B   Hip ER 3+/5 5/5 B   Knee Flexion 3+/5 5/5 B   Knee Extension 4-/5 5/5 B   Ankle DF 4-/5 5/5 B   Ankle PF 4-/5 5/5 B   Ankle Inversion 4-/5 5/5 B   Ankle Eversion 4-/5 5/5 B   Big Toe Extension 4-/5 5/5 B     L/E MMT Left  8/16/2022 Goal   Hip Flexion  4-/5 5/5 B    Hip Extension  3+/5 5/5 B   Hip Abduction  3+/5 5/5 B   Hip IR 3+/5 5/5 B   Hip ER 3+/5 5/5 B   Knee Flexion 4-/5 5/5 B   Knee Extension 4-/5 5/5 B   Ankle DF 4-/5 5/5 B   Ankle PF 4-/5 5/5 B   Ankle Inversion 4-/5 5/5 B   Ankle Eversion 4-/5 5/5 B   Big Toe Extension 4-/5 5/5 B       MUSCLE LENGTH:     Muscle Tested  Right  8/16/2022 Left   8/16/2022 Goal   Hip Flexors  decreased decreased Normal B   Hamstrings   decreased decreased Normal B   Piriformis  normal normal Normal B   Gastrocnemius  decreased decreased Normal B   Soleus  decreased decreased Normal B       SPECIAL TESTS:     Right  (spine)  8/16/2022 Left     8/16/2022 Goal   Anterior Drawer Negative Negative Negative B    Posterior Drawer Negative Negative Negative B    Valgus stress test Negative Negative Negative B    Varus stress test Negative Negative Negative B   Thessaly Negative Negative Negative B   Shaw Negative Negative Negative B   Bounce Home Negative Negative Negative B   Patellar grind Positive Positive Negative B       Observation:  No edema, ecchymosis noted.  Sensation:  Sensation is intact to light touch  Palpation:  TTP along B medial patellar facets.  Posture:  Pt presents with postural abnormalities which include: forward head and rounded shoulders  Gait Analysis: The patient ambulated with the following assistive device: none; the pt presents with the following gait abnormalities: decreased step length, ayesha, and arm swing; increased forward flexed posture, trunk sway     Movement Analysis:   Functional Test  Outcome   Double Leg Squat 1/4 squat limited by pain   Single Leg Step Down NT           TREATMENT   Total Treatment time separate from Evaluation: (10) minutes    Josie received therapeutic exercises to develop strength, endurance, ROM, flexibility, and posture for (10) minutes including: x = exercises performed     TherEx 8/16/2022    Seated hamstring stretch x 3x30 secs   LAQs x 3x10   Seated marches x 3x10   Seated heel raises x 3x10     Plan for Next Visit:     Recumbent bike x 5 mins  Bridges 2x10  Hip adduction with ball 2x10  BKFO with red tb 2x10  SLRs  2x10 B  LAQs  2x10 B  Seated hamstring stretch 2x30 secs B  Seated marches 2x10 B  Seated heel raises 2x10 B  Standing hip abduction x10 B  Standing hip extension x 10 B  Standing gastroc stretch 2x30 secs B       Home Exercises and Patient Education  Provided    Education/Self-Care provided:    Patient educated on the impairments noted above and the effects of physical therapy intervention to improve overall condition and quality of life.    Patient was educated on all the above exercise prior/during/after for proper posture, positioning, and execution for safe performance with home exercise program.     Written Home Exercises Provided: yes. Prefers: Electronic Copies  Exercises were reviewed and Josie was able to demonstrate them prior to the end of the session.  Josie demonstrated good understanding of the education provided.     See EMR under Patient Instructions for exercises provided during therapy sessions.    ASSESSMENT   Josie is a 76 y.o. female referred to outpatient Physical Therapy with a medical diagnosis of M25.561,G89.29 (ICD-10-CM) - Chronic pain of right knee   . Josie presents with clinical signs and symptoms that support this diagnosis with decreased knee and hip ROM, decreased hip girdle, quad, and hamstring Strength, patellar joint hypomobility, increased joint and soft tissue edema, and impaired functional mobility.    The above impairments will be addressed through manual therapy techniques, therapeutic exercises, functional training, and modalities as necessary. Patient was treated and educated on exercises for home program, progression of therapy, and benefits of therapy to achieve full functional mobility. Patient will benefit from skilled physical therapy to meet short and long term goals and return to prior level of function.    Pt prognosis is Good.   Pt will benefit from skilled outpatient Physical Therapy to address the deficits stated above and in the chart below, provide pt/family education, and to maximize pt's level of independence.     Plan of care discussed with patient: Yes  Pt's spiritual, cultural and educational needs considered and patient is agreeable to the plan of care and goals as stated below:     Anticipated  "Barriers for therapy: none    Medical Necessity is demonstrated by the following:   History  Co-morbidities and personal factors that may impact the plan of care Co-morbidities:   advanced age    Personal Factors:   no deficits     low   Examination  Body Structures and Functions, activity limitations and participation restrictions that may impact the plan of care Body Regions:   lower extremities    Body Systems:    gross symmetry  ROM  strength  gross coordinated movement  balance  motor control  motor learning    Participation Restrictions:   See above in "Current Level of Function"     Activity limitations:   Learning and applying knowledge  no deficits    General Tasks and Commands  no deficits    Communication  no deficits    Mobility  no deficits    Self care  no deficits    Domestic Life  no deficits    Interactions/Relationships  no deficits    Life Areas  no deficits    Community and Social Life  community life  recreation and leisure  no deficits         low   Clinical Presentation stable and uncomplicated low   Decision Making/ Complexity Score: low       GOALS:  SHORT TERM GOALS: 4 weeks 8/16/2022   1. Recent signs and systems trend is improving in order to progress towards LTG's. ongoing   2. Patient will be independent with HEP in order to further progress and return to maximal function. ongoing   3. Pain rating at Worst: 5/10 in order to progress towards increased independence with activity. ongoing   4. Patient will be able to correct postural deviations in sitting and standing, to decrease pain and promote postural awareness for injury prevention.  ongoing     LONG TERM GOALS: 8 weeks 8/16/2022   1. Patient will return to normal ADL, recreational, and work related activities with less pain and limitation.  ongoing   2. Patient will improve AROM to stated goals in order to return to maximal functional potential.  ongoing   3. Patient will improve Strength to stated goals of appropriate musculature " in order to improve functional independence.  ongoing   4. Pain Rating at Best: 1/10 to improve Quality of Life.  ongoing   5. Patient will meet predicted functional outcome (FOTO) score: 65% to increase self-worth & perceived functional ability. ongoing   6. Patient will have met/partially met personal goal of being able to walk with no pain. ongoing         PLAN   Plan of care Certification: 8/16/2022 to 11/30/2022    Outpatient Physical Therapy 1 times weekly for 6 weeks to include any combination of the following interventions: virtual visits, dry needling, modalities, electrical stimulation (IFC, Pre-Mod, Attended with Functional Dry Needling), Manual Therapy, Moist Heat/ Ice, Neuromuscular Re-ed, Patient Education, Self Care, Therapeutic Exercise, Functional Training and Therapeutic Activites     Thank you for this referral.    These services are reasonable and necessary for the conditions set forth above while under my care.    Pritesh Leos, PT, DPT

## 2022-08-23 ENCOUNTER — CLINICAL SUPPORT (OUTPATIENT)
Dept: REHABILITATION | Facility: HOSPITAL | Age: 77
End: 2022-08-23
Payer: MEDICARE

## 2022-08-23 DIAGNOSIS — M62.89 MUSCLE TIGHTNESS: ICD-10-CM

## 2022-08-23 DIAGNOSIS — R53.1 DECREASED STRENGTH: Primary | ICD-10-CM

## 2022-08-23 DIAGNOSIS — R26.89 DECREASED FUNCTIONAL MOBILITY: ICD-10-CM

## 2022-08-23 DIAGNOSIS — M25.60 DECREASED RANGE OF MOTION: ICD-10-CM

## 2022-08-23 PROCEDURE — 97110 THERAPEUTIC EXERCISES: CPT | Mod: PN

## 2022-08-23 NOTE — PROGRESS NOTES
[facility] THERAPY & WELLNESS  Physical Therapy Daily Treatment Note     Name: Josie Maxwell [facility]  Clinic Number: [id number]    Therapy Diagnosis:   Encounter Diagnoses   Name Primary?    Decreased strength Yes    Muscle tightness     Decreased functional mobility     Decreased range of motion      Physician: Charito Paiz MD    Visit Date: 8/23/2022    Physician: Charito Paiz MD  Physician Orders: PT Eval and Treat  Medical Diagnosis from Referral: M25.561,G89.29 (ICD-10-CM) - Chronic pain of right knee   Evaluation Date: 8/16/2022  Authorization Period Expiration: 08/01/2023   Plan of Care Expiration: 11/30/2022                          Progress Update: 9/16/2022             FOTO: 1 / 3   Visit # / Visits authorized: 1 / 10       Time In: 1130  Time Out: 1215  Total Billable Time: 45 minutes    Precautions: Standard  Insurance: Payor: MEDICARE / Plan: MEDICARE PART A & B / Product Type: Government /     Subjective     Pt reports: that she is doing ok today.  States that she is a little achy today due to weather.  She was compliant with home exercise program.  Response to previous treatment: n/a  Functional change: n/a    Pain: 4/10  Location: bilateral knee      Objective     Josie received therapeutic exercises to develop strength, endurance, ROM, flexibility and core stabilization for 45 minutes including:    Recumbent bike x 5 mins  Bridges 2x10  Hip adduction with ball 2x10  BKFO with red tb 2x10  SLRs  2x10 B  LAQs  2x10 B  Seated hamstring stretch 2x30 secs B  Seated marches 2x10 B  Seated heel raises 2x10 B  Standing hip abduction x10 B  Standing hip extension x 10 B  Standing gastroc stretch 2x30 secs B    Home Exercises Provided and Patient Education Provided     Education provided:   - low impact cardio.    Written Home Exercises Provided: yes.  Exercises were reviewed and Josie was able to demonstrate them prior to the end of the session.  Josie demonstrated good   understanding of the education provided.     See EMR under Patient Instructions for exercises provided prior visit.    Assessment       Josie is progressing well towards her goals.   Pt prognosis is Good.     Pt will continue to benefit from skilled outpatient physical therapy to address the deficits listed in the problem list box on initial evaluation, provide pt/family education and to maximize pt's level of independence in the home and community environment.     Pt's spiritual, cultural and educational needs considered and pt agreeable to plan of care and goals.    Anticipated barriers to physical therapy: None    Goals:     SHORT TERM GOALS: 4 weeks 8/23/2022     1. Recent signs and systems trend is improving in order to progress towards LTG's. ongoing   2. Patient will be independent with HEP in order to further progress and return to maximal function. ongoing   3. Pain rating at Worst: 5/10 in order to progress towards increased independence with activity. ongoing   4. Patient will be able to correct postural deviations in sitting and standing, to decrease pain and promote postural awareness for injury prevention.  ongoing      LONG TERM GOALS: 8 weeks 8/23/2022     1. Patient will return to normal ADL, recreational, and work related activities with less pain and limitation.  ongoing   2. Patient will improve AROM to stated goals in order to return to maximal functional potential.  ongoing   3. Patient will improve Strength to stated goals of appropriate musculature in order to improve functional independence.  ongoing   4. Pain Rating at Best: 1/10 to improve Quality of Life.  ongoing   5. Patient will meet predicted functional outcome (FOTO) score: 65% to increase self-worth & perceived functional ability. ongoing   6. Patient will have met/partially met personal goal of being able to walk with no pain. ongoing         Plan     Continue POC as previously stated.    Pritesh Leos, PT

## 2022-08-30 ENCOUNTER — CLINICAL SUPPORT (OUTPATIENT)
Dept: REHABILITATION | Facility: HOSPITAL | Age: 77
End: 2022-08-30
Payer: MEDICARE

## 2022-08-30 DIAGNOSIS — M25.60 DECREASED RANGE OF MOTION: ICD-10-CM

## 2022-08-30 DIAGNOSIS — R26.89 DECREASED FUNCTIONAL MOBILITY: ICD-10-CM

## 2022-08-30 DIAGNOSIS — R53.1 DECREASED STRENGTH: Primary | ICD-10-CM

## 2022-08-30 DIAGNOSIS — M62.89 MUSCLE TIGHTNESS: ICD-10-CM

## 2022-08-30 PROCEDURE — 97110 THERAPEUTIC EXERCISES: CPT | Mod: PN

## 2022-08-30 NOTE — PROGRESS NOTES
[facility]  Physical Therapy Daily Treatment Note     Name: Josie Maxwell [facility]  Clinic Number: [id number]    Therapy Diagnosis:   Encounter Diagnoses   Name Primary?    Decreased strength Yes    Muscle tightness     Decreased functional mobility     Decreased range of motion      Physician: Charito Paiz MD    Visit Date: 8/30/2022    Physician: Charito Paiz MD  Physician Orders: PT Eval and Treat  Medical Diagnosis from Referral: M25.561,G89.29 (ICD-10-CM) - Chronic pain of right knee   Evaluation Date: 8/16/2022  Authorization Period Expiration: 08/01/2023   Plan of Care Expiration: 11/30/2022                          Progress Update: 9/16/2022             FOTO: 1 / 3   Visit # / Visits authorized: 2 / 10       Time In: 1023   (Pnt arrived early)  Time Out: 1116  Total Billable Time: 53 minutes    Precautions: Standard  Insurance: Payor: MEDICARE / Plan: MEDICARE PART A & B / Product Type: Government /     Subjective     Pt reports: that she is doing ok today.  States that she is sore from the exercises.  She was compliant with home exercise program.  Response to previous treatment: n/a  Functional change: n/a    Pain: 3/10  Location: bilateral knee      Objective     Josie received therapeutic exercises to develop strength, endurance, ROM, flexibility and core stabilization for 53 minutes including:    Recumbent bike x 10 mins  Bridges 2x10  Hip adduction with ball 2x10  BKFO with red tb 2x10  SLRs  2x10 B  LAQs  2x10 B  Seated hamstring stretch 2x30 secs B  Seated marches 2x10 B  Seated heel raises 2x10 B  Standing hip abduction x10 B  Standing hip extension x 10 B  Standing gastroc stretch 2x30 secs B    Home Exercises Provided and Patient Education Provided     Education provided:   - low impact cardio.    Written Home Exercises Provided: yes.  Exercises were reviewed and Josie was able to demonstrate them prior to the end of the session.  Josie demonstrated  good  understanding of the education provided.     See EMR under Patient Instructions for exercises provided prior visit.    Assessment     Decreased endurance requiring multiple breaks.  Requires cueing with most exercises.  Will continue to progress with therapy.  Josie is progressing well towards her goals.   Pt prognosis is Good.     Pt will continue to benefit from skilled outpatient physical therapy to address the deficits listed in the problem list box on initial evaluation, provide pt/family education and to maximize pt's level of independence in the home and community environment.     Pt's spiritual, cultural and educational needs considered and pt agreeable to plan of care and goals.    Anticipated barriers to physical therapy: None    Goals:     SHORT TERM GOALS: 4 weeks 8/30/2022     Recent signs and systems trend is improving in order to progress towards LTG's. ongoing   Patient will be independent with HEP in order to further progress and return to maximal function. ongoing   Pain rating at Worst: 5/10 in order to progress towards increased independence with activity. ongoing   Patient will be able to correct postural deviations in sitting and standing, to decrease pain and promote postural awareness for injury prevention.  ongoing      LONG TERM GOALS: 8 weeks 8/30/2022     Patient will return to normal ADL, recreational, and work related activities with less pain and limitation.  ongoing   Patient will improve AROM to stated goals in order to return to maximal functional potential.  ongoing   Patient will improve Strength to stated goals of appropriate musculature in order to improve functional independence.  ongoing   Pain Rating at Best: 1/10 to improve Quality of Life.  ongoing   Patient will meet predicted functional outcome (FOTO) score: 65% to increase self-worth & perceived functional ability. ongoing   Patient will have met/partially met personal goal of being able to walk with no pain.  ongoing         Plan     Continue POC as previously stated.    Pritesh Leos, PT

## 2022-08-31 DIAGNOSIS — Z11.59 NEED FOR HEPATITIS C SCREENING TEST: ICD-10-CM

## 2022-08-31 DIAGNOSIS — Z78.0 MENOPAUSE: ICD-10-CM

## 2022-09-07 ENCOUNTER — CLINICAL SUPPORT (OUTPATIENT)
Dept: REHABILITATION | Facility: HOSPITAL | Age: 77
End: 2022-09-07
Payer: MEDICARE

## 2022-09-07 DIAGNOSIS — R53.1 DECREASED STRENGTH: Primary | ICD-10-CM

## 2022-09-07 DIAGNOSIS — M62.89 MUSCLE TIGHTNESS: ICD-10-CM

## 2022-09-07 DIAGNOSIS — R26.89 DECREASED FUNCTIONAL MOBILITY: ICD-10-CM

## 2022-09-07 DIAGNOSIS — M25.60 DECREASED RANGE OF MOTION: ICD-10-CM

## 2022-09-07 PROCEDURE — 97110 THERAPEUTIC EXERCISES: CPT | Mod: PN

## 2022-09-07 NOTE — PROGRESS NOTES
[facility] / [facility]  Physical Therapy Daily Treatment Note     Name: Josie Maxwell [facility]  Clinic Number: [id number]    Therapy Diagnosis:   Encounter Diagnoses   Name Primary?    Decreased strength Yes    Muscle tightness     Decreased functional mobility     Decreased range of motion      Physician: Charito Paiz MD    Visit Date: 9/7/2022    Physician: Charito Paiz MD  Physician Orders: PT Eval and Treat  Medical Diagnosis from Referral: M25.561,G89.29 (ICD-10-CM) - Chronic pain of right knee   Evaluation Date: 8/16/2022  Authorization Period Expiration: 08/01/2023   Plan of Care Expiration: 11/30/2022                          Progress Update: 9/16/2022             FOTO: 1 / 3   Visit # / Visits authorized: 3 / 10       Time In: 0853   (Pnt arrived early)  Time Out: 0948  Total Billable Time: 55 minutes    Precautions: Standard  Insurance: Payor: MEDICARE / Plan: MEDICARE PART A & B / Product Type: Government /     Subjective     Pt reports: that she is doing ok today.  States that she is sore from the exercises.  She was compliant with home exercise program.  Response to previous treatment: n/a  Functional change: n/a    Pain: 2/10  Location: bilateral knee      Objective     Josie received therapeutic exercises to develop strength, endurance, ROM, flexibility and core stabilization for 55 minutes including:    Recumbent bike x 10 mins  Bridges 2x10  Hip adduction with ball 2x10  BKFO with red tb 2x10  SLRs  2x10 B  LAQs  2x10 B  Seated hamstring stretch 2x30 secs B  Seated marches 2x10 B  Seated heel raises 2x10 B  Standing hip abduction x10 B  Standing hip extension x 10 B  Standing gastroc stretch 2x30 secs B    Home Exercises Provided and Patient Education Provided     Education provided:   - low impact cardio.    Written Home Exercises Provided: yes.  Exercises were reviewed and Josie was able to demonstrate them prior to the end of the session.  Josie demonstrated  good  understanding of the education provided.     See EMR under Patient Instructions for exercises provided prior visit.    Assessment     Decreased endurance requiring multiple breaks.  Requires cueing with most exercises.  Will continue to progress with therapy.  Josie is progressing well towards her goals.   Pt prognosis is Good.     Pt will continue to benefit from skilled outpatient physical therapy to address the deficits listed in the problem list box on initial evaluation, provide pt/family education and to maximize pt's level of independence in the home and community environment.     Pt's spiritual, cultural and educational needs considered and pt agreeable to plan of care and goals.    Anticipated barriers to physical therapy: None    Goals:     SHORT TERM GOALS: 4 weeks 9/7/2022     Recent signs and systems trend is improving in order to progress towards LTG's. ongoing   Patient will be independent with HEP in order to further progress and return to maximal function. ongoing   Pain rating at Worst: 5/10 in order to progress towards increased independence with activity. ongoing   Patient will be able to correct postural deviations in sitting and standing, to decrease pain and promote postural awareness for injury prevention.  ongoing      LONG TERM GOALS: 8 weeks 9/7/2022     Patient will return to normal ADL, recreational, and work related activities with less pain and limitation.  ongoing   Patient will improve AROM to stated goals in order to return to maximal functional potential.  ongoing   Patient will improve Strength to stated goals of appropriate musculature in order to improve functional independence.  ongoing   Pain Rating at Best: 1/10 to improve Quality of Life.  ongoing   Patient will meet predicted functional outcome (FOTO) score: 65% to increase self-worth & perceived functional ability. ongoing   Patient will have met/partially met personal goal of being able to walk with no pain. ongoing          Plan     Continue POC as previously stated.    Pritesh Leos, PT

## 2022-09-13 ENCOUNTER — CLINICAL SUPPORT (OUTPATIENT)
Dept: REHABILITATION | Facility: HOSPITAL | Age: 77
End: 2022-09-13
Payer: MEDICARE

## 2022-09-13 DIAGNOSIS — R26.89 DECREASED FUNCTIONAL MOBILITY: ICD-10-CM

## 2022-09-13 DIAGNOSIS — M25.60 DECREASED RANGE OF MOTION: ICD-10-CM

## 2022-09-13 DIAGNOSIS — M62.89 MUSCLE TIGHTNESS: ICD-10-CM

## 2022-09-13 DIAGNOSIS — R53.1 DECREASED STRENGTH: Primary | ICD-10-CM

## 2022-09-13 PROCEDURE — 97110 THERAPEUTIC EXERCISES: CPT | Mod: PN

## 2022-09-13 NOTE — PROGRESS NOTES
[facility] / [facility]  Physical Therapy Daily Treatment Note     Name: Josie Maxwell [facility]  Clinic Number: [id number]    Therapy Diagnosis:   Encounter Diagnoses   Name Primary?    Decreased strength Yes    Muscle tightness     Decreased functional mobility     Decreased range of motion      Physician: Charito Paiz MD    Visit Date: 9/13/2022    Physician: Charito Paiz MD  Physician Orders: PT Eval and Treat  Medical Diagnosis from Referral: M25.561,G89.29 (ICD-10-CM) - Chronic pain of right knee   Evaluation Date: 8/16/2022  Authorization Period Expiration: 08/01/2023   Plan of Care Expiration: 11/30/2022                          Progress Update: 9/16/2022             FOTO: 1 / 3   Visit # / Visits authorized: 4 / 10       Time In: 1023   (Pnt arrived early)  Time Out: 1118  Total Billable Time: 55 minutes    Precautions: Standard  Insurance: Payor: MEDICARE / Plan: MEDICARE PART A & B / Product Type: Government /     Subjective     Pt reports: that she walked for about 20 minutes yesterday and is sore today.  She was compliant with home exercise program.  Response to previous treatment: n/a  Functional change: n/a    Pain: 2/10  Location: bilateral knee      Objective     Josie received therapeutic exercises to develop strength, endurance, ROM, flexibility and core stabilization for 55 minutes including:    Recumbent bike x 10 mins  Bridges 2x10  Hip adduction with ball 2x10  BKFO with red tb 2x10  SLRs  2x10 B  LAQs  2x10 B  Seated hamstring stretch 2x30 secs B  Seated marches 2x10 B  Seated heel raises 2x10 B  Standing hip abduction x10 B  Standing hip extension x 10 B  Standing gastroc stretch 2x30 secs B    Home Exercises Provided and Patient Education Provided     Education provided:   - low impact cardio.    Written Home Exercises Provided: yes.  Exercises were reviewed and Josie was able to demonstrate them prior to the end of the session.  Josie demonstrated good   understanding of the education provided.     See EMR under Patient Instructions for exercises provided prior visit.    Assessment     Presents with improving lower extremity strength and endurance.  Requires cueing with most exercises.  Will continue to progress with therapy.  Josie is progressing well towards her goals.   Pt prognosis is Good.     Pt will continue to benefit from skilled outpatient physical therapy to address the deficits listed in the problem list box on initial evaluation, provide pt/family education and to maximize pt's level of independence in the home and community environment.     Pt's spiritual, cultural and educational needs considered and pt agreeable to plan of care and goals.    Anticipated barriers to physical therapy: None    Goals:     SHORT TERM GOALS: 4 weeks 9/13/2022     Recent signs and systems trend is improving in order to progress towards LTG's. ongoing   Patient will be independent with HEP in order to further progress and return to maximal function. ongoing   Pain rating at Worst: 5/10 in order to progress towards increased independence with activity. ongoing   Patient will be able to correct postural deviations in sitting and standing, to decrease pain and promote postural awareness for injury prevention.  ongoing      LONG TERM GOALS: 8 weeks 9/13/2022     Patient will return to normal ADL, recreational, and work related activities with less pain and limitation.  ongoing   Patient will improve AROM to stated goals in order to return to maximal functional potential.  ongoing   Patient will improve Strength to stated goals of appropriate musculature in order to improve functional independence.  ongoing   Pain Rating at Best: 1/10 to improve Quality of Life.  ongoing   Patient will meet predicted functional outcome (FOTO) score: 65% to increase self-worth & perceived functional ability. ongoing   Patient will have met/partially met personal goal of being able to walk with no  pain. ongoing         Plan     Continue POC as previously stated.    Pritesh Leos, PT

## 2022-09-20 ENCOUNTER — CLINICAL SUPPORT (OUTPATIENT)
Dept: REHABILITATION | Facility: HOSPITAL | Age: 77
End: 2022-09-20
Payer: MEDICARE

## 2022-09-20 DIAGNOSIS — M25.60 DECREASED RANGE OF MOTION: ICD-10-CM

## 2022-09-20 DIAGNOSIS — R53.1 DECREASED STRENGTH: Primary | ICD-10-CM

## 2022-09-20 DIAGNOSIS — R26.89 DECREASED FUNCTIONAL MOBILITY: ICD-10-CM

## 2022-09-20 DIAGNOSIS — M62.89 MUSCLE TIGHTNESS: ICD-10-CM

## 2022-09-20 PROCEDURE — 97110 THERAPEUTIC EXERCISES: CPT | Mod: PN

## 2022-09-20 NOTE — PROGRESS NOTES
[facility] / [facility]  Physical Therapy Daily Treatment Note     Name: Josie Maxwell [facility]  Clinic Number: [id number]    Therapy Diagnosis:   Encounter Diagnoses   Name Primary?    Decreased strength Yes    Muscle tightness     Decreased functional mobility     Decreased range of motion      Physician: Charito Paiz MD    Visit Date: 9/20/2022    Physician: Chraito Paiz MD  Physician Orders: PT Eval and Treat  Medical Diagnosis from Referral: M25.561,G89.29 (ICD-10-CM) - Chronic pain of right knee   Evaluation Date: 8/16/2022  Authorization Period Expiration: 08/01/2023   Plan of Care Expiration: 11/30/2022                          Progress Update: 9/16/2022             FOTO: 1 / 3   Visit # / Visits authorized: 4 / 10       Time In: 1015   (Pnt arrived early)  Time Out: 1110  Total Billable Time: 55 minutes    Precautions: Standard  Insurance: Payor: MEDICARE / Plan: MEDICARE PART A & B / Product Type: Government /     Subjective     Pt reports: that she feels great today, just a little stiff.  She was compliant with home exercise program.  Response to previous treatment: n/a  Functional change: n/a    Pain: 2/10  Location: bilateral knee      Objective     Josie received therapeutic exercises to develop strength, endurance, ROM, flexibility and core stabilization for 55 minutes including:    Recumbent bike x 10 mins  Bridges 2x10  Hip adduction with ball 2x10  BKFO with red tb 2x10  SLRs  2x10 B  LAQs  2x10 B  Seated hamstring stretch 2x30 secs B  Seated marches 2x10 B  Seated heel raises 2x10 B  Standing hip abduction x10 B  Standing hip extension x 10 B  Standing gastroc stretch 2x30 secs B    Home Exercises Provided and Patient Education Provided     Education provided:   - low impact cardio.    Written Home Exercises Provided: yes.  Exercises were reviewed and Josie was able to demonstrate them prior to the end of the session.  Josie demonstrated good  understanding of  the education provided.     See EMR under Patient Instructions for exercises provided prior visit.    Assessment     Presents with improving lower extremity strength and endurance.  Requires cueing with most exercises.  Will continue to progress with therapy.  Josie is progressing well towards her goals.   Pt prognosis is Good.     Pt will continue to benefit from skilled outpatient physical therapy to address the deficits listed in the problem list box on initial evaluation, provide pt/family education and to maximize pt's level of independence in the home and community environment.     Pt's spiritual, cultural and educational needs considered and pt agreeable to plan of care and goals.    Anticipated barriers to physical therapy: None    Goals:     SHORT TERM GOALS: 4 weeks 9/20/2022     Recent signs and systems trend is improving in order to progress towards LTG's. ongoing   Patient will be independent with HEP in order to further progress and return to maximal function. ongoing   Pain rating at Worst: 5/10 in order to progress towards increased independence with activity. ongoing   Patient will be able to correct postural deviations in sitting and standing, to decrease pain and promote postural awareness for injury prevention.  ongoing      LONG TERM GOALS: 8 weeks 9/20/2022     Patient will return to normal ADL, recreational, and work related activities with less pain and limitation.  ongoing   Patient will improve AROM to stated goals in order to return to maximal functional potential.  ongoing   Patient will improve Strength to stated goals of appropriate musculature in order to improve functional independence.  ongoing   Pain Rating at Best: 1/10 to improve Quality of Life.  ongoing   Patient will meet predicted functional outcome (FOTO) score: 65% to increase self-worth & perceived functional ability. ongoing   Patient will have met/partially met personal goal of being able to walk with no pain. ongoing          Plan     Continue POC as previously stated.    Pritesh Leos, PT

## 2022-09-27 ENCOUNTER — CLINICAL SUPPORT (OUTPATIENT)
Dept: REHABILITATION | Facility: HOSPITAL | Age: 77
End: 2022-09-27
Payer: MEDICARE

## 2022-09-27 DIAGNOSIS — M25.60 DECREASED RANGE OF MOTION: ICD-10-CM

## 2022-09-27 DIAGNOSIS — M62.89 MUSCLE TIGHTNESS: ICD-10-CM

## 2022-09-27 DIAGNOSIS — R26.89 DECREASED FUNCTIONAL MOBILITY: ICD-10-CM

## 2022-09-27 DIAGNOSIS — R53.1 DECREASED STRENGTH: Primary | ICD-10-CM

## 2022-09-27 PROCEDURE — 97110 THERAPEUTIC EXERCISES: CPT | Mod: PN,CQ

## 2022-09-27 NOTE — PROGRESS NOTES
"[facility] / [facility]  Physical Therapy Daily Treatment Note     Name: Josie Maxwell [facility]  Clinic Number: [id number]    Therapy Diagnosis:   Encounter Diagnoses   Name Primary?    Decreased strength Yes    Muscle tightness     Decreased functional mobility     Decreased range of motion      Physician: Charito Paiz MD    Visit Date: 9/27/2022    Physician: Charito Paiz MD  Physician Orders: PT Eval and Treat  Medical Diagnosis from Referral: M25.561,G89.29 (ICD-10-CM) - Chronic pain of right knee   Evaluation Date: 8/16/2022  Authorization Period Expiration: 08/01/2023   Plan of Care Expiration: 11/30/2022                          Progress Update: 9/16/2022             FOTO: 1 / 3   Visit # / Visits authorized: 7/10     PTA visit #     FOTO  Eval - 52/100  Visit # 7 - 57/100 (9/27/2022)    Time In: 1000  Time Out: 1058  Total Billable Time: 30 minutes  Total unbillable time: 28 min     Precautions: Standard  Insurance: Payor: MEDICARE / Plan: MEDICARE PART A & B / Product Type: Government /     Subjective     Pt reports: doing good, "A little achy"  She was compliant with home exercise program.  Response to previous treatment: n/a  Functional change: n/a    Pain: 2/10  Location: Right  knee      Objective     Josie received therapeutic exercises to develop strength, endurance, ROM, flexibility and core stabilization for 58 minutes including:    Recumbent bike x 10' level 3    Supine exercises:  Bridges 2 x 10 reps   Hip adduction with ball 2 x 10 reps   Bent knee fall out  with red tb 2 x 10 reps   Straight leg raise  2 x 10 bilateral     Seated exercises:  Long arc quad 2#  2 x 10 reps bilateral  hamstring stretch 3 x 30 seconds  bilateral   marches 2# 2 x 10 reps bilateral   heel raises  2 x 10 reps bilateral     Standing exercises:   hip abduction x 10 reps bilateral   hip extension x 10 reps  bilateral   gastroc stretch 3 x 30 seconds  bilateral     Home Exercises " Provided and Patient Education Provided     Education provided:   - low impact cardio.    Written Home Exercises Provided: yes.  Exercises were reviewed and Josie was able to demonstrate them prior to the end of the session.  Josie demonstrated good  understanding of the education provided.     See EMR under Patient Instructions for exercises provided prior visit.    Assessment     Josie provided good effort and participation toward therapeutic interventions today with focus on  lower extremity strengthening and flexibility.  Pt did well today without complaints.    Josie is progressing well towards her goals.   Pt prognosis is Good.     Pt will continue to benefit from skilled outpatient physical therapy to address the deficits listed in the problem list box on initial evaluation, provide pt/family education and to maximize pt's level of independence in the home and community environment.     Pt's spiritual, cultural and educational needs considered and pt agreeable to plan of care and goals.    Anticipated barriers to physical therapy: None    Goals:     SHORT TERM GOALS: 4 weeks 9/27/2022     Recent signs and systems trend is improving in order to progress towards LTG's. ongoing   Patient will be independent with HEP in order to further progress and return to maximal function. ongoing   Pain rating at Worst: 5/10 in order to progress towards increased independence with activity. ongoing   Patient will be able to correct postural deviations in sitting and standing, to decrease pain and promote postural awareness for injury prevention.  ongoing      LONG TERM GOALS: 8 weeks 9/27/2022     Patient will return to normal ADL, recreational, and work related activities with less pain and limitation.  ongoing   Patient will improve AROM to stated goals in order to return to maximal functional potential.  ongoing   Patient will improve Strength to stated goals of appropriate musculature in order to improve functional  independence.  ongoing   Pain Rating at Best: 1/10 to improve Quality of Life.  ongoing   Patient will meet predicted functional outcome (FOTO) score: 65% to increase self-worth & perceived functional ability. ongoing   Patient will have met/partially met personal goal of being able to walk with no pain. ongoing         Plan   Plan of care Certification: 8/16/2022 to 11/30/2022     Outpatient Physical Therapy 1 times weekly for 6 weeks to include any combination of the following interventions: virtual visits, dry needling, modalities, electrical stimulation (IFC, Pre-Mod, Attended with Functional Dry Needling), Manual Therapy, Moist Heat/ Ice, Neuromuscular Re-ed, Patient Education, Self Care, Therapeutic Exercise, Functional Training and Therapeutic Activites     Continue POC as previously stated.    Katie Lowe, PTA

## 2022-09-27 NOTE — PROGRESS NOTES
PT/PTA met face to face to discuss pt's treatment plan and progress towards established goals. Pt will be seen by a physical therapist minimally every 6th visit or every 30 days.        Katie Lowe PTA

## 2022-10-13 ENCOUNTER — OFFICE VISIT (OUTPATIENT)
Dept: UROGYNECOLOGY | Facility: CLINIC | Age: 77
End: 2022-10-13
Payer: MEDICARE

## 2022-10-13 VITALS
WEIGHT: 199 LBS | SYSTOLIC BLOOD PRESSURE: 141 MMHG | HEIGHT: 66 IN | HEART RATE: 80 BPM | DIASTOLIC BLOOD PRESSURE: 87 MMHG | RESPIRATION RATE: 18 BRPM | BODY MASS INDEX: 31.98 KG/M2

## 2022-10-13 DIAGNOSIS — N39.41 URGE INCONTINENCE OF URINE: ICD-10-CM

## 2022-10-13 DIAGNOSIS — N81.11 CYSTOCELE, MIDLINE: ICD-10-CM

## 2022-10-13 DIAGNOSIS — N81.9 VAGINAL VAULT PROLAPSE: ICD-10-CM

## 2022-10-13 DIAGNOSIS — R35.0 URINARY FREQUENCY: Primary | ICD-10-CM

## 2022-10-13 DIAGNOSIS — Z46.89 PESSARY MAINTENANCE: ICD-10-CM

## 2022-10-13 PROCEDURE — 99214 OFFICE O/P EST MOD 30 MIN: CPT | Mod: S$PBB,25,, | Performed by: NURSE PRACTITIONER

## 2022-10-13 PROCEDURE — 99213 OFFICE O/P EST LOW 20 MIN: CPT | Mod: PBBFAC,PO | Performed by: NURSE PRACTITIONER

## 2022-10-13 PROCEDURE — 99999 PR PBB SHADOW E&M-EST. PATIENT-LVL III: CPT | Mod: PBBFAC,,, | Performed by: NURSE PRACTITIONER

## 2022-10-13 PROCEDURE — 57160 PR FIT/INSERT INTRAVAG SUPPORT DEVICE: ICD-10-PCS | Mod: S$PBB,,, | Performed by: NURSE PRACTITIONER

## 2022-10-13 PROCEDURE — 99999 PR PBB SHADOW E&M-EST. PATIENT-LVL III: ICD-10-PCS | Mod: PBBFAC,,, | Performed by: NURSE PRACTITIONER

## 2022-10-13 PROCEDURE — 99214 PR OFFICE/OUTPT VISIT, EST, LEVL IV, 30-39 MIN: ICD-10-PCS | Mod: S$PBB,25,, | Performed by: NURSE PRACTITIONER

## 2022-10-13 PROCEDURE — 81002 URINALYSIS NONAUTO W/O SCOPE: CPT | Mod: PBBFAC,PO | Performed by: NURSE PRACTITIONER

## 2022-10-13 PROCEDURE — 87086 URINE CULTURE/COLONY COUNT: CPT | Performed by: NURSE PRACTITIONER

## 2022-10-13 PROCEDURE — 57160 INSERT PESSARY/OTHER DEVICE: CPT | Mod: S$PBB,,, | Performed by: NURSE PRACTITIONER

## 2022-10-13 PROCEDURE — 57160 INSERT PESSARY/OTHER DEVICE: CPT | Mod: PBBFAC,PO | Performed by: NURSE PRACTITIONER

## 2022-10-13 NOTE — PROGRESS NOTES
Subjective:       Patient ID: Josie Allen is a 77 y.o. female.    Chief Complaint: pessary maintenance.      HPI  Josie Allen is a 77 y.o. female.who presents today for routine pessary maintenance.  She has been using the ring pessary for awhile and feels that it works well for her.  She has been having an increase in her GODWIN and UUI.  It is starting to be somewhat bothersome for her.  She is questioning if a new pessary would help support the bladder more than the one she has.  She is not wanting to take any medication if she can avoid it. She is ready to proceed with the exam.      Review of Systems   Constitutional:  Negative for activity change, fever and unexpected weight change.   HENT:  Negative for hearing loss.    Eyes:  Negative for visual disturbance.   Respiratory:  Negative for shortness of breath and wheezing.    Cardiovascular:  Negative for chest pain, palpitations and leg swelling.   Gastrointestinal:  Negative for abdominal pain, constipation and diarrhea.   Genitourinary:  Positive for frequency and urgency. Negative for dyspareunia, dysuria, vaginal bleeding and vaginal discharge.   Musculoskeletal:  Negative for gait problem and neck pain.   Skin:  Negative for rash and wound.   Allergic/Immunologic: Negative for immunocompromised state.   Neurological:  Negative for tremors, speech difficulty and weakness.   Hematological:  Does not bruise/bleed easily.   Psychiatric/Behavioral:  Negative for agitation and confusion.      Objective:      Physical Exam  Vitals reviewed. Exam conducted with a chaperone present.   Constitutional:       General: She is not in acute distress.     Appearance: She is well-developed.   HENT:      Head: Normocephalic and atraumatic.   Neck:      Thyroid: No thyromegaly.   Pulmonary:      Effort: Pulmonary effort is normal. No respiratory distress.   Abdominal:      Palpations: Abdomen is soft.      Tenderness: There is no abdominal tenderness.       Hernia: No hernia is present.   Musculoskeletal:         General: Normal range of motion.      Cervical back: Normal range of motion.   Skin:     General: Skin is warm and dry.      Findings: No rash.   Neurological:      Mental Status: She is alert and oriented to person, place, and time.   Psychiatric:         Mood and Affect: Mood normal.         Behavior: Behavior normal.         Thought Content: Thought content normal.     Pelvic Exam:  V: No lesions. No palpable nodes.   Va: no discharge or bleeding.  Cystocele Ba=0, vaginal vault prolapse to -1 position  Meatus:No caruncle or stenosis  Urethra: Non tender. No suburethral masses.  Cx/Cuff: Normal   Uterus: surgically absent  Ad: No mass or tenderness.  Levators :Symmetrical. Normal tone. Non tender.  BL: Non tender  RV: No hemorrhoids.      Assessment:       1. Urinary frequency    2. Urge incontinence of urine    3. Cystocele, midline    4. Vaginal vault prolapse    5. Pessary maintenance            Procedure note- #4 ring pessary removed with forceps and cleaned with soap and water.  After betadine irrigation of the vagina, a new #4 ring pessary was reinserted into the vagina.  Pt ambulated in the room and denies any discomfort.  NP reminded pt that the first 24-48 hours are the most likely time for the pessary to fall out and to monitor the toilet before flushing.  Pt verbalized understanding.      Plan:       Urinary frequency- we will send her urine for culture as noted below.    -     POCT URINE DIPSTICK WITHOUT MICROSCOPE  -     Urine culture    Urge incontinence of urine- trial of pelvic floor exercises    Cystocele, midline- trial of new #4 ring pessary    Vaginal vault prolapse- as noted above    Pessary maintenance- as noted above    RTC 3 months or PRN

## 2022-10-14 LAB
BACTERIA UR CULT: NORMAL
BACTERIA UR CULT: NORMAL

## 2022-10-18 ENCOUNTER — TELEPHONE (OUTPATIENT)
Dept: UROGYNECOLOGY | Facility: CLINIC | Age: 77
End: 2022-10-18

## 2022-10-18 NOTE — TELEPHONE ENCOUNTER
----- Message from TRAY Del Rio sent at 10/18/2022 11:59 AM CDT -----  Her urine culture shows multiple organisms, none in predominance.  This tends to be vaginal or skin contamination.  How is she feeling?

## 2023-01-12 ENCOUNTER — OFFICE VISIT (OUTPATIENT)
Dept: UROGYNECOLOGY | Facility: CLINIC | Age: 78
End: 2023-01-12
Payer: MEDICARE

## 2023-01-12 VITALS
HEART RATE: 86 BPM | DIASTOLIC BLOOD PRESSURE: 84 MMHG | SYSTOLIC BLOOD PRESSURE: 118 MMHG | WEIGHT: 198.44 LBS | BODY MASS INDEX: 32.02 KG/M2

## 2023-01-12 DIAGNOSIS — R35.0 URINARY FREQUENCY: Primary | ICD-10-CM

## 2023-01-12 DIAGNOSIS — N39.41 URGE INCONTINENCE OF URINE: ICD-10-CM

## 2023-01-12 DIAGNOSIS — T83.89XA VAGINAL IRRITATION FROM PESSARY: ICD-10-CM

## 2023-01-12 DIAGNOSIS — N89.8 VAGINAL IRRITATION FROM PESSARY: ICD-10-CM

## 2023-01-12 DIAGNOSIS — Z46.89 PESSARY MAINTENANCE: ICD-10-CM

## 2023-01-12 DIAGNOSIS — N81.11 CYSTOCELE, MIDLINE: ICD-10-CM

## 2023-01-12 DIAGNOSIS — N81.9 VAGINAL VAULT PROLAPSE: ICD-10-CM

## 2023-01-12 LAB
BILIRUBIN, UA POC OHS: NEGATIVE
BLOOD, UA POC OHS: NEGATIVE
CLARITY, UA POC OHS: CLEAR
COLOR, UA POC OHS: YELLOW
GLUCOSE, UA POC OHS: NEGATIVE
KETONES, UA POC OHS: NEGATIVE
LEUKOCYTES, UA POC OHS: ABNORMAL
NITRITE, UA POC OHS: NEGATIVE
PH, UA POC OHS: 7
PROTEIN, UA POC OHS: NEGATIVE
SPECIFIC GRAVITY, UA POC OHS: 1.01
UROBILINOGEN, UA POC OHS: 0.2

## 2023-01-12 PROCEDURE — 99213 OFFICE O/P EST LOW 20 MIN: CPT | Mod: PBBFAC,PO | Performed by: NURSE PRACTITIONER

## 2023-01-12 PROCEDURE — 99999 PR PBB SHADOW E&M-EST. PATIENT-LVL III: CPT | Mod: PBBFAC,,, | Performed by: NURSE PRACTITIONER

## 2023-01-12 PROCEDURE — 99213 OFFICE O/P EST LOW 20 MIN: CPT | Mod: S$PBB,,, | Performed by: NURSE PRACTITIONER

## 2023-01-12 PROCEDURE — 99213 PR OFFICE/OUTPT VISIT, EST, LEVL III, 20-29 MIN: ICD-10-PCS | Mod: S$PBB,,, | Performed by: NURSE PRACTITIONER

## 2023-01-12 PROCEDURE — 81003 URINALYSIS AUTO W/O SCOPE: CPT | Mod: PBBFAC,PO | Performed by: NURSE PRACTITIONER

## 2023-01-12 PROCEDURE — 99999 PR PBB SHADOW E&M-EST. PATIENT-LVL III: ICD-10-PCS | Mod: PBBFAC,,, | Performed by: NURSE PRACTITIONER

## 2023-01-12 NOTE — PROGRESS NOTES
Subjective:       Patient ID: Josie Allen is a 77 y.o. female.    Chief Complaint: Pessary Check    HPI  Josie Allen is a 77 y.o. female.who presents today for routine pessary maintenance.  At her last visit, we changed the pessary for a new pessary.  She does feel that the new pessary has helped with her frequency and urgency.  She feels that she is emptying her bladder a little better.  She is wanting to wait for any medications and feels that she is able to wait as her symptoms have improved.  She denies any problems with the pessary.  She feels that she is doing well and is ready to proceed with pessary maintenance.      Review of Systems   Constitutional:  Negative for activity change, fever and unexpected weight change.   HENT:  Negative for hearing loss.    Eyes:  Negative for visual disturbance.   Respiratory:  Negative for shortness of breath and wheezing.    Cardiovascular:  Negative for chest pain, palpitations and leg swelling.   Gastrointestinal:  Negative for abdominal pain, constipation and diarrhea.   Genitourinary:  Positive for frequency. Negative for dyspareunia, dysuria, urgency, vaginal bleeding and vaginal discharge.   Musculoskeletal:  Negative for gait problem and neck pain.   Skin:  Negative for rash and wound.   Allergic/Immunologic: Negative for immunocompromised state.   Neurological:  Negative for tremors, speech difficulty and weakness.   Hematological:  Does not bruise/bleed easily.   Psychiatric/Behavioral:  Negative for agitation and confusion.      Objective:      Physical Exam  Vitals reviewed. Exam conducted with a chaperone present.   Constitutional:       General: She is not in acute distress.     Appearance: She is well-developed.   HENT:      Head: Normocephalic and atraumatic.   Neck:      Thyroid: No thyromegaly.   Pulmonary:      Effort: Pulmonary effort is normal. No respiratory distress.   Abdominal:      Palpations: Abdomen is soft.      Tenderness:  There is no abdominal tenderness.      Hernia: No hernia is present.   Musculoskeletal:         General: Normal range of motion.      Cervical back: Normal range of motion.   Skin:     General: Skin is warm and dry.      Findings: No rash.   Neurological:      Mental Status: She is alert and oriented to person, place, and time.   Psychiatric:         Mood and Affect: Mood normal.         Behavior: Behavior normal.         Thought Content: Thought content normal.     Pelvic Exam:  V: No lesions. No palpable nodes.   Va: small amount of thin white discharge.  No bleeding noted.  Mild irritation noted near the 10 o'clock position.  Area cauterized with silver nitrate.  Midline cystocele Ba=-1, vaginal vault prolapse 0  Meatus:No caruncle or stenosis  Urethra: Non tender. No suburethral masses.  Cx/Cuff: Normal   Uterus: surgically absent  Ad: No mass or tenderness.  Levators :Symmetrical. Normal tone. Non tender.  BL: Non tender  RV: No hemorrhoids.      Assessment:       1. Urinary frequency    2. Urge incontinence of urine    3. Cystocele, midline    4. Vaginal vault prolapse    5. Pessary maintenance    6. Vaginal irritation from pessary            Procedure note- #4 ring pessary removed and cleaned with soap and water.  After betadine irrigation of the vagina, #4 ring pessary was reinserted into the vagina.  Pt ambulated in the room and denies any discomfort.  NP reminded pt that the first 24-48 hours are the most likely time for the pessary to fall out and to monitor the toilet before flushing.  Pt verbalized understanding.      Plan:       Urinary frequency- monitor  -     POCT Urinalysis(Instrument)    Urge incontinence of urine- monitor    Cystocele, midline- continue with ring pessary    Vaginal vault prolapse- as noted above    Pessary maintenance- as noted above    Vaginal irritation from pessary- area cauterized with silver nitrate.  Monitor    RTC 3 months

## 2023-02-02 ENCOUNTER — OFFICE VISIT (OUTPATIENT)
Dept: FAMILY MEDICINE | Facility: CLINIC | Age: 78
End: 2023-02-02
Payer: MEDICARE

## 2023-02-02 VITALS
DIASTOLIC BLOOD PRESSURE: 78 MMHG | HEIGHT: 66 IN | RESPIRATION RATE: 18 BRPM | WEIGHT: 200.19 LBS | BODY MASS INDEX: 32.17 KG/M2 | OXYGEN SATURATION: 97 % | HEART RATE: 76 BPM | TEMPERATURE: 98 F | SYSTOLIC BLOOD PRESSURE: 132 MMHG

## 2023-02-02 DIAGNOSIS — K21.9 GASTROESOPHAGEAL REFLUX DISEASE WITHOUT ESOPHAGITIS: ICD-10-CM

## 2023-02-02 DIAGNOSIS — F33.1 MODERATE EPISODE OF RECURRENT MAJOR DEPRESSIVE DISORDER: ICD-10-CM

## 2023-02-02 DIAGNOSIS — J30.89 ENVIRONMENTAL AND SEASONAL ALLERGIES: ICD-10-CM

## 2023-02-02 DIAGNOSIS — E66.9 OBESITY, CLASS I, BMI 30-34.9: Primary | ICD-10-CM

## 2023-02-02 DIAGNOSIS — J30.1 SEASONAL ALLERGIC RHINITIS DUE TO POLLEN: ICD-10-CM

## 2023-02-02 DIAGNOSIS — J02.9 SORE THROAT: ICD-10-CM

## 2023-02-02 DIAGNOSIS — E78.5 HYPERLIPIDEMIA, UNSPECIFIED HYPERLIPIDEMIA TYPE: ICD-10-CM

## 2023-02-02 LAB
CTP QC/QA: YES
S PYO RRNA THROAT QL PROBE: NEGATIVE

## 2023-02-02 PROCEDURE — 99214 OFFICE O/P EST MOD 30 MIN: CPT | Mod: ,,, | Performed by: FAMILY MEDICINE

## 2023-02-02 PROCEDURE — 99214 PR OFFICE/OUTPT VISIT, EST, LEVL IV, 30-39 MIN: ICD-10-PCS | Mod: ,,, | Performed by: FAMILY MEDICINE

## 2023-02-02 PROCEDURE — 87880 STREP A ASSAY W/OPTIC: CPT | Mod: QW,RHCUB | Performed by: FAMILY MEDICINE

## 2023-02-02 RX ORDER — CETIRIZINE HYDROCHLORIDE 5 MG/1
5 TABLET ORAL DAILY
Qty: 90 TABLET | Refills: 3 | Status: SHIPPED | OUTPATIENT
Start: 2023-02-02 | End: 2023-08-08 | Stop reason: ALTCHOICE

## 2023-02-02 RX ORDER — ESCITALOPRAM OXALATE 10 MG/1
10 TABLET ORAL DAILY
Qty: 90 TABLET | Refills: 3 | Status: SHIPPED | OUTPATIENT
Start: 2023-02-02 | End: 2024-02-08 | Stop reason: SDUPTHER

## 2023-02-02 RX ORDER — FLUTICASONE PROPIONATE 50 MCG
2 SPRAY, SUSPENSION (ML) NASAL DAILY
Qty: 118.2 ML | Refills: 1 | Status: SHIPPED | OUTPATIENT
Start: 2023-02-02 | End: 2023-08-08 | Stop reason: SDUPTHER

## 2023-02-02 RX ORDER — EZETIMIBE 10 MG/1
10 TABLET ORAL DAILY
Qty: 90 TABLET | Refills: 3 | Status: SHIPPED | OUTPATIENT
Start: 2023-02-02 | End: 2023-04-25

## 2023-02-02 NOTE — PROGRESS NOTES
Subjective:       Patient ID: Josie Allen is a 77 y.o. female.    Chief Complaint: Follow-up and Sore Throat (6 month follow up, sore throat since last night, nasal drainage )    This patient is new to me.  Josie Allen presents to the clinic today for 6 month follow up. Patient states she also started with a sore throat last night. Patient states she also has post nasal drip but is not currently on her allergy medication.   Patient states she is otherwise doing ok.   Patient educated on plan of care, verbalized understanding.       Review of Systems   Constitutional:  Negative for activity change, appetite change, chills, diaphoresis and fever.   HENT:  Negative for congestion, ear pain, postnasal drip, sinus pressure, sneezing and sore throat.    Eyes:  Negative for pain, discharge, redness and itching.   Respiratory:  Negative for apnea, cough, chest tightness, shortness of breath and wheezing.    Cardiovascular:  Negative for chest pain and leg swelling.   Gastrointestinal:  Negative for abdominal distention, abdominal pain, constipation, diarrhea, nausea and vomiting.   Genitourinary:  Negative for difficulty urinating, dysuria, flank pain and frequency.   Skin:  Negative for color change, rash and wound.   Neurological:  Negative for dizziness.     Patient Active Problem List   Diagnosis    Vaginal vault prolapse, posthysterectomy    Prolapse of anterior vaginal wall    Tinnitus    MVP (mitral valve prolapse)    Hypercholesterolemia    Gastroesophageal reflux disease    Depression    Anxiety    Allergic rhinitis    Chronic pain of right knee    Candidal intertrigo    Chronic constipation    Decreased strength    Muscle tightness    Decreased functional mobility    Decreased range of motion       Objective:      Physical Exam  Vitals reviewed.   Constitutional:       General: She is not in acute distress.     Appearance: Normal appearance. She is well-developed.   HENT:      Head:  "Normocephalic.      Right Ear: Tympanic membrane, ear canal and external ear normal.      Left Ear: Tympanic membrane, ear canal and external ear normal.      Nose: Nose normal.      Mouth/Throat:      Mouth: Mucous membranes are moist.      Pharynx: Posterior oropharyngeal erythema present.   Eyes:      Conjunctiva/sclera: Conjunctivae normal.      Pupils: Pupils are equal, round, and reactive to light.   Cardiovascular:      Rate and Rhythm: Normal rate and regular rhythm.      Heart sounds: Normal heart sounds.   Pulmonary:      Effort: Pulmonary effort is normal. No respiratory distress.      Breath sounds: Normal breath sounds.   Musculoskeletal:      Cervical back: Normal range of motion and neck supple.   Skin:     General: Skin is warm and dry.      Capillary Refill: Capillary refill takes less than 2 seconds.      Findings: No rash.   Neurological:      General: No focal deficit present.      Mental Status: She is alert and oriented to person, place, and time.   Psychiatric:         Mood and Affect: Mood normal.         Behavior: Behavior normal.       Lab Results   Component Value Date    CHOL 269 (H) 08/02/2022    TRIG 99 08/02/2022    HDL 64 08/02/2022     The 10-year ASCVD risk score (London MCCLELLAND, et al., 2019) is: 23.3%    Values used to calculate the score:      Age: 77 years      Sex: Female      Is Non- : No      Diabetic: No      Tobacco smoker: No      Systolic Blood Pressure: 132 mmHg      Is BP treated: No      HDL Cholesterol: 64 mg/dL      Total Cholesterol: 269 mg/dL  Visit Vitals  /78 (BP Location: Right arm, Patient Position: Sitting, BP Method: Medium (Manual))   Pulse 76   Temp 98.4 °F (36.9 °C) (Oral)   Resp 18   Ht 5' 6" (1.676 m)   Wt 90.8 kg (200 lb 2.8 oz)   SpO2 97%   BMI 32.31 kg/m²      Assessment:       1. Obesity, Class I, BMI 30-34.9    2. Gastroesophageal reflux disease without esophagitis    3. Hyperlipidemia, unspecified hyperlipidemia type    4. " Sore throat    5. Moderate episode of recurrent major depressive disorder    6. Environmental and seasonal allergies    7. Seasonal allergic rhinitis due to pollen        Plan:       Josie was seen today for follow-up and sore throat.    Diagnoses and all orders for this visit:    Obesity, Class I, BMI 30-34.9  Body mass index is 32.31 kg/m².  Continue healthy diet and regular exercise as tolerated.  Continue medications as prescribed.  Follow up with PCP     Gastroesophageal reflux disease without esophagitis  -     Comprehensive Metabolic Panel; Future  -     CBC Auto Differential; Future  Stable  Continue medications as prescribed.  Follow up with PCP     Hyperlipidemia, unspecified hyperlipidemia type  -     Lipid Panel; Future  -     ezetimibe (ZETIA) 10 mg tablet; Take 1 tablet (10 mg total) by mouth once daily.  Stable  Continue medications as prescribed.  Follow up with PCP     Sore throat  -     POCT Rapid Strep A    Moderate episode of recurrent major depressive disorder  -     EScitalopram oxalate (LEXAPRO) 10 MG tablet; Take 1 tablet (10 mg total) by mouth once daily.  Stable  Continue medications as prescribed.  Follow up with PCP     Environmental and seasonal allergies / Seasonal allergic rhinitis due to pollen  -     fluticasone propionate (FLONASE) 50 mcg/actuation nasal spray; 2 sprays (100 mcg total) by Each Nostril route once daily.  -     cetirizine (ZYRTEC) 5 MG tablet; Take 1 tablet (5 mg total) by mouth once daily.  Continue medications as prescribed.  Follow up with PCP      Follow up in about 6 months (around 8/2/2023), or if symptoms worsen or fail to improve, for scheduled appt, fasting labs prior to next appointment.      Future Appointments       Date Provider Specialty Appt Notes    4/13/2023 TRAY [place] Urogy[facility] 3 month pessary    8/2/2023  Lab .    8/3/2023 Patrizia Ayala NP Family Medicine 6 mo f/u with lab work prior

## 2023-04-25 ENCOUNTER — OFFICE VISIT (OUTPATIENT)
Dept: UROGYNECOLOGY | Facility: CLINIC | Age: 78
End: 2023-04-25
Payer: MEDICARE

## 2023-04-25 VITALS
HEIGHT: 66 IN | DIASTOLIC BLOOD PRESSURE: 83 MMHG | HEART RATE: 80 BPM | WEIGHT: 200.19 LBS | SYSTOLIC BLOOD PRESSURE: 144 MMHG | BODY MASS INDEX: 32.17 KG/M2

## 2023-04-25 DIAGNOSIS — N95.2 ATROPHIC VAGINITIS: ICD-10-CM

## 2023-04-25 DIAGNOSIS — Z46.89 PESSARY MAINTENANCE: ICD-10-CM

## 2023-04-25 DIAGNOSIS — R35.0 URINARY FREQUENCY: Primary | ICD-10-CM

## 2023-04-25 DIAGNOSIS — N39.41 URGE INCONTINENCE OF URINE: ICD-10-CM

## 2023-04-25 DIAGNOSIS — L30.9 DERMATITIS: ICD-10-CM

## 2023-04-25 DIAGNOSIS — N81.9 VAGINAL VAULT PROLAPSE: ICD-10-CM

## 2023-04-25 DIAGNOSIS — N81.11 CYSTOCELE, MIDLINE: ICD-10-CM

## 2023-04-25 PROCEDURE — 99213 PR OFFICE/OUTPT VISIT, EST, LEVL III, 20-29 MIN: ICD-10-PCS | Mod: S$PBB,,, | Performed by: NURSE PRACTITIONER

## 2023-04-25 PROCEDURE — 99999 PR PBB SHADOW E&M-EST. PATIENT-LVL III: ICD-10-PCS | Mod: PBBFAC,,, | Performed by: NURSE PRACTITIONER

## 2023-04-25 PROCEDURE — 99213 OFFICE O/P EST LOW 20 MIN: CPT | Mod: PBBFAC,PO | Performed by: NURSE PRACTITIONER

## 2023-04-25 PROCEDURE — 99999 PR PBB SHADOW E&M-EST. PATIENT-LVL III: CPT | Mod: PBBFAC,,, | Performed by: NURSE PRACTITIONER

## 2023-04-25 PROCEDURE — 99213 OFFICE O/P EST LOW 20 MIN: CPT | Mod: S$PBB,,, | Performed by: NURSE PRACTITIONER

## 2023-04-25 RX ORDER — ESTRADIOL 0.1 MG/G
CREAM VAGINAL
Qty: 42.5 G | Refills: 3 | Status: SHIPPED | OUTPATIENT
Start: 2023-04-25

## 2023-04-25 RX ORDER — CLOBETASOL PROPIONATE 0.5 MG/G
CREAM TOPICAL 2 TIMES DAILY
Qty: 45 G | Refills: 3 | Status: SHIPPED | OUTPATIENT
Start: 2023-04-25 | End: 2023-04-26 | Stop reason: SDUPTHER

## 2023-04-25 NOTE — PROGRESS NOTES
Subjective:       Patient ID: Josie Allen is a 77 y.o. female.    Chief Complaint: Pessary Check    HPI  Josie Allen is a 77 y.o. female.  Who presents today for routine pessary maintenance.  She was last seen in our office on 01/12/2023.  She feels that she has been doing fairly well in regards to the pessary.  She does have some mild irritation in the vaginal area.  She was actually used clobetasol in the past and felt that this was helpful for her.  She was asking for a refill of this today.  She also is requesting a refill on her Estrace cream.  She does use it about 3 times a week.  She denies any vaginal discharge or bleeding, but again does have some mild irritation.  She denies any urinary complaints/concerns at this time.  She is ready to proceed with the pessary.    Review of Systems   Constitutional:  Negative for activity change, fever and unexpected weight change.   HENT:  Negative for hearing loss.    Eyes:  Negative for visual disturbance.   Respiratory:  Negative for shortness of breath and wheezing.    Cardiovascular:  Negative for chest pain, palpitations and leg swelling.   Gastrointestinal:  Negative for abdominal pain, constipation and diarrhea.   Genitourinary:  Positive for frequency. Negative for dyspareunia, dysuria, urgency, vaginal bleeding and vaginal discharge.   Musculoskeletal:  Negative for gait problem and neck pain.   Skin:  Negative for rash and wound.   Allergic/Immunologic: Negative for immunocompromised state.   Neurological:  Negative for tremors, speech difficulty and weakness.   Hematological:  Does not bruise/bleed easily.   Psychiatric/Behavioral:  Negative for agitation and confusion.      Objective:      Physical Exam  Vitals reviewed. Exam conducted with a chaperone present.   Constitutional:       General: She is not in acute distress.     Appearance: She is well-developed.   HENT:      Head: Normocephalic and atraumatic.   Neck:      Thyroid: No  thyromegaly.   Pulmonary:      Effort: Pulmonary effort is normal. No respiratory distress.   Abdominal:      Palpations: Abdomen is soft.      Tenderness: There is no abdominal tenderness.      Hernia: No hernia is present.   Musculoskeletal:         General: Normal range of motion.      Cervical back: Normal range of motion.   Skin:     General: Skin is warm and dry.      Findings: No rash.   Neurological:      Mental Status: She is alert and oriented to person, place, and time.   Psychiatric:         Mood and Affect: Mood normal.         Behavior: Behavior normal.         Thought Content: Thought content normal.     Pelvic Exam:  V: No lesions. No palpable nodes.   Va:  Mild irritation noted near the 10 o'clock position.  Area cauterized with silver nitrate.  Dominant midline cystocele BA = 0, vaginal vault prolapse  Meatus:No caruncle or stenosis  Urethra: Non tender. No suburethral masses.  Cx/Cuff: Normal   Uterus:  Surgically absent  Ad: No mass or tenderness.  Levators :Symmetrical. Normal tone. Non tender.  BL: Non tender  RV: No hemorrhoids.      Assessment:       1. Urinary frequency    2. Urge incontinence of urine    3. Cystocele, midline    4. Vaginal vault prolapse    5. Pessary maintenance    6. Atrophic vaginitis    7. Dermatitis          Procedure note- #3 ring  pessary removed and cleaned with soap and water.  After betadine irrigation of the vagina, #3 ring pessary was reinserted into the vagina.  Pt ambulated in the room and denies any discomfort.  NP reminded pt that the first 24-48 hours are the most likely time for the pessary to fall out and to monitor the toilet before flushing.  Pt verbalized understanding.      Plan:       Urinary frequency-monitor    Urge incontinence of urine-monitor    Cystocele, midline-continue with ring pessary    Vaginal vault prolapse-continue with ring pessary    Pessary maintenance-as noted above    Atrophic vaginitis-continue with Estrace  -     estradioL  (ESTRACE) 0.01 % (0.1 mg/gram) vaginal cream; Apply 1 fingertipful to vaginal area nightly x 2 weeks then use on MWF  Dispense: 42.5 g; Refill: 3    Dermatitis-trial of clobetasol as noted below  -     clobetasoL (TEMOVATE) 0.05 % cream; Apply topically 2 (two) times daily. for 15 days  Dispense: 45 g; Refill: 3          RTC 3 months

## 2023-04-26 DIAGNOSIS — L30.9 DERMATITIS: ICD-10-CM

## 2023-04-26 RX ORDER — CLOBETASOL PROPIONATE 0.5 MG/G
CREAM TOPICAL 2 TIMES DAILY
Qty: 45 G | Refills: 3 | Status: SHIPPED | OUTPATIENT
Start: 2023-04-26 | End: 2023-07-06 | Stop reason: SDUPTHER

## 2023-04-26 NOTE — TELEPHONE ENCOUNTER
----- Message from Larisa Duron sent at 4/26/2023  9:24 AM CDT -----  Contact: Pt @ [phone]  Type:  RX Refill Request    Who Called: Pt  Refill or New Rx:Refill  RX Name and Strength:c  How is the patient currently taking it? (ex. 1XDay):as prescribed   Is this a 30 day or 90 day RX:30 day  Preferred Pharmacy with phone number:      [id number] - [place], [place] - [address] [address] [address] [facility] [address]  [address] [facility] [address]  [place] [address]  Phone: [phone] Fax: [phone]      Local or Mail Order:Local  Ordering Provider:Ivis Carrera  Would the patient rather a call back or a response via [facility]? call  Best Call Back Number:[phone]  Additional Information: Pt would like her prescription to be called in to [facility]. Please call pt's prescription in.

## 2023-04-26 NOTE — TELEPHONE ENCOUNTER
Patient called  and states that the compounding pharmacy can not fill the rx please sen d milvia jensen.

## 2023-07-06 ENCOUNTER — OFFICE VISIT (OUTPATIENT)
Dept: UROGYNECOLOGY | Facility: CLINIC | Age: 78
End: 2023-07-06
Payer: MEDICARE

## 2023-07-06 VITALS — SYSTOLIC BLOOD PRESSURE: 174 MMHG | HEART RATE: 81 BPM | DIASTOLIC BLOOD PRESSURE: 101 MMHG

## 2023-07-06 DIAGNOSIS — N39.41 URGE INCONTINENCE OF URINE: ICD-10-CM

## 2023-07-06 DIAGNOSIS — N81.11 CYSTOCELE, MIDLINE: ICD-10-CM

## 2023-07-06 DIAGNOSIS — Z46.89 PESSARY MAINTENANCE: ICD-10-CM

## 2023-07-06 DIAGNOSIS — L30.9 DERMATITIS: ICD-10-CM

## 2023-07-06 DIAGNOSIS — R35.0 URINARY FREQUENCY: Primary | ICD-10-CM

## 2023-07-06 DIAGNOSIS — N95.2 ATROPHIC VAGINITIS: ICD-10-CM

## 2023-07-06 DIAGNOSIS — N81.9 VAGINAL VAULT PROLAPSE: ICD-10-CM

## 2023-07-06 LAB
BILIRUBIN, UA POC OHS: NEGATIVE
BLOOD, UA POC OHS: NEGATIVE
CLARITY, UA POC OHS: CLEAR
COLOR, UA POC OHS: YELLOW
GLUCOSE, UA POC OHS: NEGATIVE
KETONES, UA POC OHS: NEGATIVE
LEUKOCYTES, UA POC OHS: ABNORMAL
NITRITE, UA POC OHS: NEGATIVE
PH, UA POC OHS: 6.5
PROTEIN, UA POC OHS: NEGATIVE
SPECIFIC GRAVITY, UA POC OHS: <=1.005
UROBILINOGEN, UA POC OHS: 0.2

## 2023-07-06 PROCEDURE — 81003 URINALYSIS AUTO W/O SCOPE: CPT | Mod: PBBFAC,PO | Performed by: NURSE PRACTITIONER

## 2023-07-06 PROCEDURE — 99213 PR OFFICE/OUTPT VISIT, EST, LEVL III, 20-29 MIN: ICD-10-PCS | Mod: S$PBB,,, | Performed by: NURSE PRACTITIONER

## 2023-07-06 PROCEDURE — 99999 PR PBB SHADOW E&M-EST. PATIENT-LVL III: CPT | Mod: PBBFAC,,, | Performed by: NURSE PRACTITIONER

## 2023-07-06 PROCEDURE — 99999 PR PBB SHADOW E&M-EST. PATIENT-LVL III: ICD-10-PCS | Mod: PBBFAC,,, | Performed by: NURSE PRACTITIONER

## 2023-07-06 PROCEDURE — 99213 OFFICE O/P EST LOW 20 MIN: CPT | Mod: PBBFAC,PO | Performed by: NURSE PRACTITIONER

## 2023-07-06 PROCEDURE — 99213 OFFICE O/P EST LOW 20 MIN: CPT | Mod: S$PBB,,, | Performed by: NURSE PRACTITIONER

## 2023-07-06 RX ORDER — CLOBETASOL PROPIONATE 0.5 MG/G
CREAM TOPICAL 2 TIMES DAILY
Qty: 45 G | Refills: 3 | Status: SHIPPED | OUTPATIENT
Start: 2023-07-06 | End: 2023-07-21

## 2023-07-06 NOTE — PROGRESS NOTES
Subjective:       Patient ID: Josie Allen is a 77 y.o. female.    Chief Complaint: Pessary Check    HPI  Josie Allen is a 77 y.o. female.  Who presents today for routine pessary maintenance.  She was last seen in our office on 04/25/2023.  She feels that the pessary is working well for.  She denies any vaginal discharge/bleeding/bulging around pessary.  At her last visit she would asked for a refill on the clobetasol.  This was inadvertently sent to [facility].  The patient had apparently to [facility], but the message was not received by our office.  We will send this in for her today.  She denies any real urinary complaints/concerns at this time is ready to proceed with the pessary    Review of Systems   Constitutional:  Negative for activity change, fever and unexpected weight change.   HENT:  Negative for hearing loss.    Eyes:  Negative for visual disturbance.   Respiratory:  Negative for shortness of breath and wheezing.    Cardiovascular:  Negative for chest pain, palpitations and leg swelling.   Gastrointestinal:  Negative for abdominal pain, constipation and diarrhea.   Genitourinary:  Positive for frequency. Negative for dyspareunia, dysuria, urgency, vaginal bleeding and vaginal discharge.   Musculoskeletal:  Negative for gait problem and neck pain.   Skin:  Negative for rash and wound.   Allergic/Immunologic: Negative for immunocompromised state.   Neurological:  Negative for tremors, speech difficulty and weakness.   Hematological:  Does not bruise/bleed easily.   Psychiatric/Behavioral:  Negative for agitation and confusion.      Objective:      Physical Exam  Vitals reviewed. Exam conducted with a chaperone present.   Constitutional:       General: She is not in acute distress.     Appearance: She is well-developed.   HENT:      Head: Normocephalic and atraumatic.   Neck:      Thyroid: No thyromegaly.   Pulmonary:      Effort: Pulmonary effort is normal. No respiratory  distress.   Abdominal:      Palpations: Abdomen is soft.      Tenderness: There is no abdominal tenderness.      Hernia: No hernia is present.   Musculoskeletal:         General: Normal range of motion.      Cervical back: Normal range of motion.   Skin:     General: Skin is warm and dry.      Findings: No rash.   Neurological:      Mental Status: She is alert and oriented to person, place, and time.   Psychiatric:         Mood and Affect: Mood normal.         Behavior: Behavior normal.         Thought Content: Thought content normal.     Pelvic Exam:  V: No lesions. No palpable nodes.   Va:  No discharge or bleeding.  Midline cystocele BA equals 0 with elevation of the bladder vaginal vault prolapse to negative 2 position  Meatus:No caruncle or stenosis  Urethra: Non tender. No suburethral masses.  Cx/Cuff: Normal   Uterus:  Surgically absent  Ad: No mass or tenderness.  Levators :Symmetrical. Normal tone. Non tender.  BL: Non tender  RV: No hemorrhoids.      Assessment:       1. Urinary frequency    2. Urge incontinence of urine    3. Cystocele, midline    4. Vaginal vault prolapse    5. Pessary maintenance    6. Atrophic vaginitis          Procedure note- #3 ring  pessary removed and cleaned with soap and water.  After betadine irrigation of the vagina, #3 ring pessary was reinserted into the vagina.  Pt ambulated in the room and denies any discomfort.  NP reminded pt that the first 24-48 hours are the most likely time for the pessary to fall out and to monitor the toilet before flushing.  Pt verbalized understanding.      Plan:       Urinary frequency monitor  -     POCT Urinalysis(Instrument)    Urge incontinence of urine monitor    Cystocele, midline continue with ring pessary    Vaginal vault prolapse continue with ring pessary    Pessary maintenance continue with ring pessary    Atrophic vaginitis continue with Estrace    RTC 3 months

## 2023-07-26 ENCOUNTER — TELEPHONE (OUTPATIENT)
Dept: FAMILY MEDICINE | Facility: CLINIC | Age: 78
End: 2023-07-26
Payer: MEDICARE

## 2023-07-26 NOTE — TELEPHONE ENCOUNTER
Pt has been rescheduled to 8/8/23 at 9am with Charity Ayala due to provider being out of the office on 8/3/23 .   Pt verbalized understanding.

## 2023-08-02 ENCOUNTER — LAB VISIT (OUTPATIENT)
Dept: LAB | Facility: CLINIC | Age: 78
End: 2023-08-02
Payer: MEDICARE

## 2023-08-02 DIAGNOSIS — K21.9 GASTROESOPHAGEAL REFLUX DISEASE WITHOUT ESOPHAGITIS: ICD-10-CM

## 2023-08-02 DIAGNOSIS — E78.5 HYPERLIPIDEMIA, UNSPECIFIED HYPERLIPIDEMIA TYPE: ICD-10-CM

## 2023-08-02 DIAGNOSIS — Z11.59 NEED FOR HEPATITIS C SCREENING TEST: ICD-10-CM

## 2023-08-02 LAB
ALBUMIN SERPL BCP-MCNC: 3.7 G/DL (ref 3.5–5.2)
ALP SERPL-CCNC: 98 U/L (ref 55–135)
ALT SERPL W/O P-5'-P-CCNC: 20 U/L (ref 10–44)
ANION GAP SERPL CALC-SCNC: 8 MMOL/L (ref 8–16)
AST SERPL-CCNC: 22 U/L (ref 10–40)
BASOPHILS # BLD AUTO: 0.05 K/UL (ref 0–0.2)
BASOPHILS NFR BLD: 0.9 % (ref 0–1.9)
BILIRUB SERPL-MCNC: 0.6 MG/DL (ref 0.1–1)
BUN SERPL-MCNC: 12 MG/DL (ref 8–23)
CALCIUM SERPL-MCNC: 9.8 MG/DL (ref 8.7–10.5)
CHLORIDE SERPL-SCNC: 102 MMOL/L (ref 95–110)
CHOLEST SERPL-MCNC: 288 MG/DL (ref 120–199)
CHOLEST/HDLC SERPL: 4.3 {RATIO} (ref 2–5)
CO2 SERPL-SCNC: 27 MMOL/L (ref 23–29)
CREAT SERPL-MCNC: 0.9 MG/DL (ref 0.5–1.4)
DIFFERENTIAL METHOD: ABNORMAL
EOSINOPHIL # BLD AUTO: 0.5 K/UL (ref 0–0.5)
EOSINOPHIL NFR BLD: 9 % (ref 0–8)
ERYTHROCYTE [DISTWIDTH] IN BLOOD BY AUTOMATED COUNT: 13.2 % (ref 11.5–14.5)
EST. GFR  (NO RACE VARIABLE): >60 ML/MIN/1.73 M^2
GLUCOSE SERPL-MCNC: 91 MG/DL (ref 70–110)
HCT VFR BLD AUTO: 43.7 % (ref 37–48.5)
HCV AB SERPL QL IA: NORMAL
HDLC SERPL-MCNC: 67 MG/DL (ref 40–75)
HDLC SERPL: 23.3 % (ref 20–50)
HGB BLD-MCNC: 14.4 G/DL (ref 12–16)
IMM GRANULOCYTES # BLD AUTO: 0.02 K/UL (ref 0–0.04)
IMM GRANULOCYTES NFR BLD AUTO: 0.4 % (ref 0–0.5)
LDLC SERPL CALC-MCNC: 203.6 MG/DL (ref 63–159)
LYMPHOCYTES # BLD AUTO: 2 K/UL (ref 1–4.8)
LYMPHOCYTES NFR BLD: 37.6 % (ref 18–48)
MCH RBC QN AUTO: 30.8 PG (ref 27–31)
MCHC RBC AUTO-ENTMCNC: 33 G/DL (ref 32–36)
MCV RBC AUTO: 94 FL (ref 82–98)
MONOCYTES # BLD AUTO: 0.5 K/UL (ref 0.3–1)
MONOCYTES NFR BLD: 8.7 % (ref 4–15)
NEUTROPHILS # BLD AUTO: 2.4 K/UL (ref 1.8–7.7)
NEUTROPHILS NFR BLD: 43.4 % (ref 38–73)
NONHDLC SERPL-MCNC: 221 MG/DL
NRBC BLD-RTO: 0 /100 WBC
PLATELET # BLD AUTO: 150 K/UL (ref 150–450)
PMV BLD AUTO: 12.5 FL (ref 9.2–12.9)
POTASSIUM SERPL-SCNC: 4.2 MMOL/L (ref 3.5–5.1)
PROT SERPL-MCNC: 6.8 G/DL (ref 6–8.4)
RBC # BLD AUTO: 4.67 M/UL (ref 4–5.4)
SODIUM SERPL-SCNC: 137 MMOL/L (ref 136–145)
TRIGL SERPL-MCNC: 87 MG/DL (ref 30–150)
WBC # BLD AUTO: 5.43 K/UL (ref 3.9–12.7)

## 2023-08-02 PROCEDURE — 80061 LIPID PANEL: CPT | Performed by: FAMILY MEDICINE

## 2023-08-02 PROCEDURE — 85025 COMPLETE CBC W/AUTO DIFF WBC: CPT | Performed by: FAMILY MEDICINE

## 2023-08-02 PROCEDURE — 80053 COMPREHEN METABOLIC PANEL: CPT | Performed by: FAMILY MEDICINE

## 2023-08-02 PROCEDURE — 86803 HEPATITIS C AB TEST: CPT | Performed by: STUDENT IN AN ORGANIZED HEALTH CARE EDUCATION/TRAINING PROGRAM

## 2023-08-08 ENCOUNTER — OFFICE VISIT (OUTPATIENT)
Dept: FAMILY MEDICINE | Facility: CLINIC | Age: 78
End: 2023-08-08
Payer: MEDICARE

## 2023-08-08 ENCOUNTER — TELEPHONE (OUTPATIENT)
Dept: FAMILY MEDICINE | Facility: CLINIC | Age: 78
End: 2023-08-08

## 2023-08-08 VITALS
HEART RATE: 98 BPM | OXYGEN SATURATION: 97 % | DIASTOLIC BLOOD PRESSURE: 78 MMHG | BODY MASS INDEX: 31.66 KG/M2 | SYSTOLIC BLOOD PRESSURE: 126 MMHG | HEIGHT: 66 IN | RESPIRATION RATE: 18 BRPM | WEIGHT: 197 LBS

## 2023-08-08 DIAGNOSIS — J30.89 ENVIRONMENTAL AND SEASONAL ALLERGIES: ICD-10-CM

## 2023-08-08 DIAGNOSIS — G89.29 CHRONIC PAIN OF RIGHT KNEE: ICD-10-CM

## 2023-08-08 DIAGNOSIS — E78.5 HYPERLIPIDEMIA, UNSPECIFIED HYPERLIPIDEMIA TYPE: ICD-10-CM

## 2023-08-08 DIAGNOSIS — E66.9 OBESITY, CLASS I, BMI 30-34.9: Primary | ICD-10-CM

## 2023-08-08 DIAGNOSIS — M25.561 CHRONIC PAIN OF RIGHT KNEE: ICD-10-CM

## 2023-08-08 PROCEDURE — 99214 OFFICE O/P EST MOD 30 MIN: CPT | Mod: ,,, | Performed by: FAMILY MEDICINE

## 2023-08-08 PROCEDURE — 99214 PR OFFICE/OUTPT VISIT, EST, LEVL IV, 30-39 MIN: ICD-10-PCS | Mod: ,,, | Performed by: FAMILY MEDICINE

## 2023-08-08 RX ORDER — LORATADINE 10 MG/1
10 TABLET ORAL DAILY
Qty: 90 TABLET | Refills: 3 | Status: SHIPPED | OUTPATIENT
Start: 2023-08-08 | End: 2024-08-07

## 2023-08-08 RX ORDER — LORATADINE 10 MG/1
10 TABLET ORAL DAILY
Qty: 90 TABLET | Refills: 3 | Status: SHIPPED | OUTPATIENT
Start: 2023-08-08 | End: 2023-08-08

## 2023-08-08 RX ORDER — DICLOFENAC SODIUM 10 MG/G
2 GEL TOPICAL 3 TIMES DAILY
Qty: 450 G | Refills: 3 | Status: SHIPPED | OUTPATIENT
Start: 2023-08-08 | End: 2023-08-08

## 2023-08-08 RX ORDER — DICLOFENAC SODIUM 10 MG/G
2 GEL TOPICAL 3 TIMES DAILY
Qty: 450 G | Refills: 3 | Status: SHIPPED | OUTPATIENT
Start: 2023-08-08

## 2023-08-08 RX ORDER — FLUTICASONE PROPIONATE 50 MCG
2 SPRAY, SUSPENSION (ML) NASAL DAILY
Qty: 118.2 ML | Refills: 1 | Status: SHIPPED | OUTPATIENT
Start: 2023-08-08

## 2023-08-08 RX ORDER — HYDROGEN PEROXIDE 3 %
20 SOLUTION, NON-ORAL MISCELLANEOUS
COMMUNITY

## 2023-08-08 RX ORDER — FLUTICASONE PROPIONATE 50 MCG
2 SPRAY, SUSPENSION (ML) NASAL DAILY
Qty: 118.2 ML | Refills: 1 | Status: SHIPPED | OUTPATIENT
Start: 2023-08-08 | End: 2023-08-08

## 2023-08-08 NOTE — TELEPHONE ENCOUNTER
Patient was calling regarding the medications going to her [facility] and not the [facility], informed patient this has been corrected

## 2023-08-08 NOTE — PATIENT INSTRUCTIONS
Kal Galindo,     If you are due for any health screening(s) below please notify me so we can arrange them to be ordered and scheduled to maintain your health. Most healthy patients complete it. Don't lose out on improving your health.     All of your core healthy metrics are met.                               Thank you for allowing me to be part of your healthcare team at [facility]. It is a pleasure to care for you today.   Please take all of your medications as instructed and follow all new instructions from your visit today.  If you received labs or medical tests today you should hear information about results or scheduling either by phone or mychart within approximately a week.   If you have any questions or concerns please do not hesitate to call. Have a blessed day and I hope to see you again soon.  Patrizia Ayala

## 2023-08-08 NOTE — TELEPHONE ENCOUNTER
----- Message from Madison Mcwilliams sent at 8/8/2023  4:29 PM CDT -----  Regarding: RX REFILL  Contact: MIKAYLA SUBRAMANIAN [phone]    Type: Needs Medical Advice      Who Called:  MIKAYLA SUBRAMANIAN     Best Call Back Number: [phone] (home)       Pharmacy:   [id number], [place] [address] AT [facility] [address] [facility]  [address] [facility] [address] [facility] [address]  Phone: [phone] Fax: [phone]      Additional Information: Patient is calling to speak with nurse/MA regarding Rx Refill was sent to [facility] pharmacy. Requesting if it can be resent to [facility] pharmacy.   Please call back and advise. Thanks

## 2023-08-08 NOTE — PROGRESS NOTES
Subjective:       Patient ID: Josie Allen is a 77 y.o. female.    Chief Complaint: Follow-up (Pt presents to the clinic for a 6m f/u )    Josie Allen presents to the clinic today for 6 month wellness exam and lab review. Previous blood work reviewed with patient today.   Patient states she has increased fatigue and chronic knee pain.   Patient has a wasp/hornet sting on her left arm today from a few days ago. Patient reports she was in the yard pulling weeds when she was stung. Patient states she is apply over the counter anti-itch and it is improving.   Patient educated on plan of care, verbalized understanding.         Review of Systems   Constitutional:  Negative for activity change, appetite change, chills, diaphoresis and fever.   HENT:  Negative for congestion, ear pain, postnasal drip, sinus pressure, sneezing and sore throat.    Eyes:  Negative for pain, discharge, redness and itching.   Respiratory:  Negative for apnea, cough, chest tightness, shortness of breath and wheezing.    Cardiovascular:  Negative for chest pain and leg swelling.   Gastrointestinal:  Negative for abdominal distention, abdominal pain, constipation, diarrhea, nausea and vomiting.   Genitourinary:  Negative for difficulty urinating, dysuria, flank pain and frequency.   Musculoskeletal:  Positive for arthralgias.   Skin:  Negative for color change, rash and wound.   Neurological:  Negative for dizziness.   Psychiatric/Behavioral:  Positive for dysphoric mood. The patient is nervous/anxious.        Patient Active Problem List   Diagnosis    Vaginal vault prolapse, posthysterectomy    Prolapse of anterior vaginal wall    Tinnitus    MVP (mitral valve prolapse)    Hyperlipidemia    Gastroesophageal reflux disease    Depression    Anxiety    Allergic rhinitis    Chronic pain of right knee    Candidal intertrigo    Chronic constipation    Decreased strength    Muscle tightness    Decreased functional mobility    Decreased  "range of motion       Objective:      Physical Exam  Vitals reviewed.   Constitutional:       General: She is not in acute distress.     Appearance: Normal appearance. She is well-developed.   HENT:      Head: Normocephalic.      Nose: Nose normal.   Eyes:      Conjunctiva/sclera: Conjunctivae normal.      Pupils: Pupils are equal, round, and reactive to light.   Cardiovascular:      Rate and Rhythm: Normal rate and regular rhythm.      Heart sounds: Normal heart sounds.   Pulmonary:      Effort: Pulmonary effort is normal. No respiratory distress.      Breath sounds: Normal breath sounds.   Musculoskeletal:      Cervical back: Normal range of motion and neck supple.   Skin:     General: Skin is warm and dry.      Findings: No rash.   Neurological:      Mental Status: She is alert and oriented to person, place, and time.   Psychiatric:         Behavior: Behavior normal.         Lab Results   Component Value Date    WBC 5.43 08/02/2023    HGB 14.4 08/02/2023    HCT 43.7 08/02/2023     08/02/2023    CHOL 288 (H) 08/02/2023    TRIG 87 08/02/2023    HDL 67 08/02/2023    ALT 20 08/02/2023    AST 22 08/02/2023     08/02/2023    K 4.2 08/02/2023     08/02/2023    CREATININE 0.9 08/02/2023    BUN 12 08/02/2023    CO2 27 08/02/2023     The 10-year ASCVD risk score (London MCCLELLAND, et al., 2019) is: 21.6%    Values used to calculate the score:      Age: 77 years      Sex: Female      Is Non- : No      Diabetic: No      Tobacco smoker: No      Systolic Blood Pressure: 126 mmHg      Is BP treated: No      HDL Cholesterol: 67 mg/dL      Total Cholesterol: 288 mg/dL  Visit Vitals  /78 (BP Location: Right arm, Patient Position: Sitting, BP Method: Large (Manual))   Pulse 98   Resp 18   Ht 5' 6" (1.676 m)   Wt 89.4 kg (197 lb)   SpO2 97%   BMI 31.80 kg/m²      Assessment:       1. Obesity, Class I, BMI 30-34.9    2. Chronic pain of right knee    3. Environmental and seasonal allergies  "   4. Hyperlipidemia, unspecified hyperlipidemia type        Plan:       Josie was seen today for follow-up.    Diagnoses and all orders for this visit:    Obesity, Class I, BMI 30-34.9  Body mass index is 31.8 kg/m².  Continue healthy diet and regular exercise as tolerated..  Continue medications as prescribed.  Follow up with PCP     Chronic pain of right knee  -     diclofenac sodium (VOLTAREN) 1 % Gel; Apply 2 g topically 3 (three) times daily.  Stable  Continue medications as prescribed.  Follow up with PCP     Environmental and seasonal allergies  -     fluticasone propionate (FLONASE) 50 mcg/actuation nasal spray; 2 sprays (100 mcg total) by Each Nostril route once daily.  -     loratadine (CLARITIN) 10 mg tablet; Take 1 tablet (10 mg total) by mouth once daily.  Stable  Continue medications as prescribed.  Follow up with PCP     Hyperlipidemia, unspecified hyperlipidemia type  Stable per recent blood work   Continue medications as prescribed.  Follow up with PCP      Follow up in about 6 months (around 2/8/2024), or if symptoms worsen or fail to improve.  I have reviewed the notes, assessments, and/or procedures performed by Patrizia Ayala NP, I concur with her/his documentation of Josie Maxwell Erum Osman    Future Appointments       Date Provider Specialty Appt Notes    10/5/2023 DAVID Carrera, [facility] Urogynecology pessary check    2/8/2024 Patrizia Ayala NP Family Medicine 6 mo f/u             All of your core healthy metrics are met.

## 2023-09-21 DIAGNOSIS — Z78.0 MENOPAUSE: ICD-10-CM

## 2023-10-12 ENCOUNTER — OFFICE VISIT (OUTPATIENT)
Dept: UROGYNECOLOGY | Facility: CLINIC | Age: 78
End: 2023-10-12
Payer: MEDICARE

## 2023-10-12 DIAGNOSIS — Z46.89 PESSARY MAINTENANCE: ICD-10-CM

## 2023-10-12 DIAGNOSIS — N95.2 ATROPHIC VAGINITIS: ICD-10-CM

## 2023-10-12 DIAGNOSIS — N81.11 CYSTOCELE, MIDLINE: ICD-10-CM

## 2023-10-12 DIAGNOSIS — N39.41 URGE INCONTINENCE OF URINE: ICD-10-CM

## 2023-10-12 DIAGNOSIS — R35.0 URINARY FREQUENCY: Primary | ICD-10-CM

## 2023-10-12 DIAGNOSIS — N81.9 VAGINAL VAULT PROLAPSE: ICD-10-CM

## 2023-10-12 LAB
BILIRUBIN, UA POC OHS: NEGATIVE
BLOOD, UA POC OHS: ABNORMAL
CLARITY, UA POC OHS: CLEAR
COLOR, UA POC OHS: YELLOW
GLUCOSE, UA POC OHS: NEGATIVE
KETONES, UA POC OHS: NEGATIVE
LEUKOCYTES, UA POC OHS: ABNORMAL
NITRITE, UA POC OHS: NEGATIVE
PH, UA POC OHS: 7
PROTEIN, UA POC OHS: NEGATIVE
SPECIFIC GRAVITY, UA POC OHS: 1.01
UROBILINOGEN, UA POC OHS: 0.2

## 2023-10-12 PROCEDURE — 99213 PR OFFICE/OUTPT VISIT, EST, LEVL III, 20-29 MIN: ICD-10-PCS | Mod: S$PBB,,, | Performed by: NURSE PRACTITIONER

## 2023-10-12 PROCEDURE — 99213 OFFICE O/P EST LOW 20 MIN: CPT | Mod: S$PBB,,, | Performed by: NURSE PRACTITIONER

## 2023-10-12 PROCEDURE — 99999 PR PBB SHADOW E&M-EST. PATIENT-LVL II: CPT | Mod: PBBFAC,,, | Performed by: NURSE PRACTITIONER

## 2023-10-12 PROCEDURE — 81003 URINALYSIS AUTO W/O SCOPE: CPT | Mod: PBBFAC,PO | Performed by: NURSE PRACTITIONER

## 2023-10-12 PROCEDURE — 99999PBSHW POCT URINALYSIS(INSTRUMENT): ICD-10-PCS | Mod: PBBFAC,,,

## 2023-10-12 PROCEDURE — 99999 PR PBB SHADOW E&M-EST. PATIENT-LVL II: ICD-10-PCS | Mod: PBBFAC,,, | Performed by: NURSE PRACTITIONER

## 2023-10-12 PROCEDURE — 99999PBSHW POCT URINALYSIS(INSTRUMENT): Mod: PBBFAC,,,

## 2023-10-12 PROCEDURE — 99212 OFFICE O/P EST SF 10 MIN: CPT | Mod: PBBFAC,PO | Performed by: NURSE PRACTITIONER

## 2023-10-12 NOTE — PROGRESS NOTES
Subjective:       Patient ID: Josie Allen is a 78 y.o. female.    Chief Complaint: Pessary Check    HPI  Josie Allen is a 78 y.o. female.  Who presents today for routine pessary maintenance.  She was last seen in our office on 07/06/2023.  She feels that she is doing well.  She denies any pain/discharge/bleeding with the pessary.  She denies any urinary complaints/concerns at this time.  She was using her Estrace cream and feels that it has been helpful.  She denies any other acute complaints/concerns is ready to proceed with the pessary.    Review of Systems   Constitutional:  Negative for activity change, fever and unexpected weight change.   HENT:  Negative for hearing loss.    Eyes:  Negative for visual disturbance.   Respiratory:  Negative for shortness of breath and wheezing.    Cardiovascular:  Negative for chest pain, palpitations and leg swelling.   Gastrointestinal:  Negative for abdominal pain, constipation and diarrhea.   Genitourinary:  Positive for frequency. Negative for dyspareunia, dysuria, urgency, vaginal bleeding and vaginal discharge.   Musculoskeletal:  Negative for gait problem and neck pain.   Skin:  Negative for rash and wound.   Allergic/Immunologic: Negative for immunocompromised state.   Neurological:  Negative for tremors, speech difficulty and weakness.   Hematological:  Does not bruise/bleed easily.   Psychiatric/Behavioral:  Negative for agitation and confusion.        Objective:      Physical Exam  Vitals reviewed. Exam conducted with a chaperone present.   Constitutional:       General: She is not in acute distress.     Appearance: She is well-developed.   HENT:      Head: Normocephalic and atraumatic.   Neck:      Thyroid: No thyromegaly.   Pulmonary:      Effort: Pulmonary effort is normal. No respiratory distress.   Abdominal:      Palpations: Abdomen is soft.      Tenderness: There is no abdominal tenderness.      Hernia: No hernia is present.    Musculoskeletal:         General: Normal range of motion.      Cervical back: Normal range of motion.   Skin:     General: Skin is warm and dry.      Findings: No rash.   Neurological:      Mental Status: She is alert and oriented to person, place, and time.   Psychiatric:         Mood and Affect: Mood normal.         Behavior: Behavior normal.         Thought Content: Thought content normal.       Pelvic Exam:  V: No lesions. No palpable nodes.   Va:  No discharge or bleeding.  Good length.  Midline cystocele BA=0, with elevation of the bladder vaginal vault prolapse to -2 position  Meatus:No caruncle or stenosis  Urethra: Non tender. No suburethral masses.  Cx/Cuff: Normal   Uterus:  Surgically absent  Ad: No mass or tenderness.  Levators :Symmetrical. Normal tone. Non tender.  BL: Non tender  RV: No hemorrhoids.      Assessment:       1. Urinary frequency    2. Urge incontinence of urine    3. Cystocele, midline    4. Vaginal vault prolapse    5. Pessary maintenance    6. Atrophic vaginitis          Procedure note- #3 ring  pessary removed and cleaned with soap and water.  After betadine irrigation of the vagina, #3 ring pessary was reinserted into the vagina.  Pt ambulated in the room and denies any discomfort.  NP reminded pt that the first 24-48 hours are the most likely time for the pessary to fall out and to monitor the toilet before flushing.  Pt verbalized understanding.      Plan:       Urinary frequency monitor  -     POCT Urinalysis(Instrument)    Urge incontinence of urine monitor    Cystocele, midline continue with ring pessary    Vaginal vault prolapse as noted above    Pessary maintenance as noted above    Atrophic vaginitis continue with Estrace    RTC 3 months

## 2024-01-11 ENCOUNTER — OFFICE VISIT (OUTPATIENT)
Dept: UROGYNECOLOGY | Facility: CLINIC | Age: 79
End: 2024-01-11
Payer: MEDICARE

## 2024-01-11 DIAGNOSIS — Z46.89 PESSARY MAINTENANCE: ICD-10-CM

## 2024-01-11 DIAGNOSIS — N81.9 VAGINAL VAULT PROLAPSE: ICD-10-CM

## 2024-01-11 DIAGNOSIS — N81.11 CYSTOCELE, MIDLINE: ICD-10-CM

## 2024-01-11 DIAGNOSIS — N95.2 ATROPHIC VAGINITIS: ICD-10-CM

## 2024-01-11 DIAGNOSIS — N39.41 URGE INCONTINENCE OF URINE: ICD-10-CM

## 2024-01-11 DIAGNOSIS — R35.0 URINARY FREQUENCY: Primary | ICD-10-CM

## 2024-01-11 PROCEDURE — 99213 OFFICE O/P EST LOW 20 MIN: CPT | Mod: PBBFAC,PO | Performed by: NURSE PRACTITIONER

## 2024-01-11 PROCEDURE — 99999 PR PBB SHADOW E&M-EST. PATIENT-LVL III: CPT | Mod: PBBFAC,,, | Performed by: NURSE PRACTITIONER

## 2024-01-11 PROCEDURE — 99213 OFFICE O/P EST LOW 20 MIN: CPT | Mod: S$PBB,,, | Performed by: NURSE PRACTITIONER

## 2024-01-11 NOTE — PROGRESS NOTES
Subjective:       Patient ID: Josie Allen is a 78 y.o. female.    Chief Complaint: Pessary Check    HPI  Josie Allen is a 78 y.o. female.  Routine pessary maintenance.  She feels that she was doing well in regards to the pessary.  She denies any vaginal discharge bleeding or bulging around the pessary.  She denies any urinary complaints concerns at this time.  She did have upper respiratory issues around Jodi time and she was slowly recovering.  She denies any other acute complaints/concerns at this time is ready to proceed with the pessary.    Review of Systems   Constitutional:  Negative for activity change, fever and unexpected weight change.   HENT:  Negative for hearing loss.    Eyes:  Negative for visual disturbance.   Respiratory:  Negative for shortness of breath and wheezing.    Cardiovascular:  Negative for chest pain, palpitations and leg swelling.   Gastrointestinal:  Negative for abdominal pain, constipation and diarrhea.   Genitourinary:  Positive for frequency. Negative for dyspareunia, dysuria, urgency, vaginal bleeding and vaginal discharge.   Musculoskeletal:  Negative for gait problem and neck pain.   Skin:  Negative for rash and wound.   Allergic/Immunologic: Negative for immunocompromised state.   Neurological:  Negative for tremors, speech difficulty and weakness.   Hematological:  Does not bruise/bleed easily.   Psychiatric/Behavioral:  Negative for agitation and confusion.        Objective:      Physical Exam  Vitals reviewed. Exam conducted with a chaperone present.   Constitutional:       General: She is not in acute distress.     Appearance: She is well-developed.   HENT:      Head: Normocephalic and atraumatic.   Neck:      Thyroid: No thyromegaly.   Pulmonary:      Effort: Pulmonary effort is normal. No respiratory distress.   Abdominal:      Palpations: Abdomen is soft.      Tenderness: There is no abdominal tenderness.      Hernia: No hernia is present.    Musculoskeletal:         General: Normal range of motion.      Cervical back: Normal range of motion.   Skin:     General: Skin is warm and dry.      Findings: No rash.   Neurological:      Mental Status: She is alert and oriented to person, place, and time.   Psychiatric:         Mood and Affect: Mood normal.         Behavior: Behavior normal.         Thought Content: Thought content normal.       Pelvic Exam:  V: No lesions. No palpable nodes.   Va:  No discharge or bleeding.  Midline cystocele BA equals 0.  Vaginal vault prolapse to negative 2 position  Meatus:No caruncle or stenosis  Urethra: Non tender. No suburethral masses.  Cx/Cuff: Normal   Uterus:  Surgically absent  Ad: No mass or tenderness.  Levators :Symmetrical. Normal tone. Non tender.  BL: Non tender  RV: No hemorrhoids.      Assessment:       1. Urinary frequency    2. Urge incontinence of urine    3. Cystocele, midline    4. Vaginal vault prolapse    5. Pessary maintenance    6. Atrophic vaginitis          Procedure note- #3 ring  pessary removed and cleaned with soap and water.  After betadine irrigation of the vagina, #3 pessary was reinserted into the vagina.  Pt ambulated in the room and denies any discomfort.  NP reminded pt that the first 24-48 hours are the most likely time for the pessary to fall out and to monitor the toilet before flushing.  Pt verbalized understanding.      Plan:       Urinary frequency monitor    Urge incontinence of urine monitor    Cystocele, midline continue with ring pessary    Vaginal vault prolapse continue with ring pessary    Pessary maintenance continue with ring pessary    Atrophic vaginitis continue Estrace cream    RTC 3 months

## 2024-02-08 ENCOUNTER — OFFICE VISIT (OUTPATIENT)
Dept: FAMILY MEDICINE | Facility: CLINIC | Age: 79
End: 2024-02-08
Payer: MEDICARE

## 2024-02-08 VITALS
BODY MASS INDEX: 32.1 KG/M2 | WEIGHT: 199.75 LBS | HEART RATE: 73 BPM | DIASTOLIC BLOOD PRESSURE: 76 MMHG | HEIGHT: 66 IN | OXYGEN SATURATION: 97 % | RESPIRATION RATE: 16 BRPM | SYSTOLIC BLOOD PRESSURE: 138 MMHG

## 2024-02-08 DIAGNOSIS — R73.09 OTHER ABNORMAL GLUCOSE: ICD-10-CM

## 2024-02-08 DIAGNOSIS — E66.9 OBESITY, CLASS I, BMI 30-34.9: ICD-10-CM

## 2024-02-08 DIAGNOSIS — F33.1 MODERATE EPISODE OF RECURRENT MAJOR DEPRESSIVE DISORDER: ICD-10-CM

## 2024-02-08 DIAGNOSIS — E78.5 HYPERLIPIDEMIA, UNSPECIFIED HYPERLIPIDEMIA TYPE: Primary | ICD-10-CM

## 2024-02-08 PROCEDURE — 99214 OFFICE O/P EST MOD 30 MIN: CPT | Mod: S$PBB,,, | Performed by: FAMILY MEDICINE

## 2024-02-08 PROCEDURE — 99214 OFFICE O/P EST MOD 30 MIN: CPT | Mod: PBBFAC,PO | Performed by: FAMILY MEDICINE

## 2024-02-08 PROCEDURE — 99999 PR PBB SHADOW E&M-EST. PATIENT-LVL IV: CPT | Mod: PBBFAC,,, | Performed by: FAMILY MEDICINE

## 2024-02-08 RX ORDER — ESCITALOPRAM OXALATE 10 MG/1
10 TABLET ORAL DAILY
Qty: 90 TABLET | Refills: 3 | Status: SHIPPED | OUTPATIENT
Start: 2024-02-08 | End: 2025-02-07

## 2024-02-08 NOTE — PROGRESS NOTES
Subjective:       Patient ID: Josie Allen is a 78 y.o. female.    Chief Complaint: Follow-up (Pt presents to the clinic for a 6m f/u)    Josie Allen presents to the clinic today for 6 month follow up.  Reports she has been doing well lately but she would was sick a few weeks ago.  Patient reports she does still have a lingering cough but she is managing this herself.  Patient reports she does need medication refills while she is here today.  Patient is blood work prior to her next visit this was ordered today.  Patient educated on plan of care, verbalized understanding.         Review of Systems   Constitutional:  Negative for activity change, appetite change, chills, diaphoresis and fever.   HENT:  Negative for congestion, ear pain, postnasal drip, sinus pressure, sneezing and sore throat.    Eyes:  Negative for pain, discharge, redness and itching.   Respiratory:  Positive for cough. Negative for apnea, chest tightness, shortness of breath and wheezing.    Cardiovascular:  Negative for chest pain and leg swelling.   Gastrointestinal:  Negative for abdominal distention, abdominal pain, constipation, diarrhea, nausea and vomiting.   Genitourinary:  Negative for difficulty urinating, dysuria, flank pain and frequency.   Skin:  Negative for color change, rash and wound.   Neurological:  Negative for dizziness.       Patient Active Problem List   Diagnosis    Vaginal vault prolapse, posthysterectomy    Prolapse of anterior vaginal wall    Tinnitus    MVP (mitral valve prolapse)    Hyperlipidemia    Gastroesophageal reflux disease    Depression    Anxiety    Allergic rhinitis    Chronic pain of right knee    Candidal intertrigo    Chronic constipation    Decreased strength    Muscle tightness    Decreased functional mobility    Decreased range of motion       Objective:      Physical Exam  Vitals reviewed.   Constitutional:       General: She is not in acute distress.     Appearance: Normal appearance.  "She is well-developed.   HENT:      Head: Normocephalic.      Right Ear: Ear canal and external ear normal. No middle ear effusion. Tympanic membrane is bulging.      Left Ear: Ear canal and external ear normal.  No middle ear effusion. Tympanic membrane is bulging.      Nose: Nose normal.   Eyes:      Conjunctiva/sclera: Conjunctivae normal.      Pupils: Pupils are equal, round, and reactive to light.   Cardiovascular:      Rate and Rhythm: Normal rate and regular rhythm.      Heart sounds: Normal heart sounds.   Pulmonary:      Effort: Pulmonary effort is normal. No respiratory distress.      Breath sounds: Normal breath sounds.   Musculoskeletal:      Cervical back: Normal range of motion and neck supple.   Skin:     General: Skin is warm and dry.      Findings: No rash.   Neurological:      Mental Status: She is alert and oriented to person, place, and time.   Psychiatric:         Mood and Affect: Mood normal.         Behavior: Behavior normal.         Lab Results   Component Value Date    WBC 5.43 08/02/2023    HGB 14.4 08/02/2023    HCT 43.7 08/02/2023     08/02/2023    CHOL 288 (H) 08/02/2023    TRIG 87 08/02/2023    HDL 67 08/02/2023    ALT 20 08/02/2023    AST 22 08/02/2023     08/02/2023    K 4.2 08/02/2023     08/02/2023    CREATININE 0.9 08/02/2023    BUN 12 08/02/2023    CO2 27 08/02/2023     The 10-year ASCVD risk score ([place] MCCLELLAND, et al., 2019) is: 27.6%    Values used to calculate the score:      Age: 78 years      Sex: Female      Is Non- : No      Diabetic: No      Tobacco smoker: No      Systolic Blood Pressure: 138 mmHg      Is BP treated: No      HDL Cholesterol: 67 mg/dL      Total Cholesterol: 288 mg/dL  Visit Vitals  /76 (BP Location: Right arm, Patient Position: Sitting, BP Method: Medium (Manual))   Pulse 73   Resp 16   Ht 5' 6" (1.676 m)   Wt 90.6 kg (199 lb 11.8 oz)   SpO2 97%   BMI 32.24 kg/m²      Assessment:       1. Hyperlipidemia, " unspecified hyperlipidemia type    2. Obesity, Class I, BMI 30-34.9    3. Other abnormal glucose    4. Moderate episode of recurrent major depressive disorder        Plan:       Josie was seen today for follow-up.    Diagnoses and all orders for this visit:    Hyperlipidemia, unspecified hyperlipidemia type  -     T4, Free; Future  -     TSH; Future  -     Lipid Panel; Future  -     Comprehensive Metabolic Panel; Future  -     CBC Auto Differential; Future  Stable  Continue medications as prescribed.  Follow up with PCP    Obesity, Class I, BMI 30-34.9  -     Hemoglobin A1C; Future  Body mass index is 32.24 kg/m².  Continue healthy diet and regular exercise as tolerated.  Continue medications as prescribed.  Follow up with PCP    Other abnormal glucose  -     Hemoglobin A1C; Future  Continue medications as prescribed.  Follow up with PCP    Moderate episode of recurrent major depressive disorder  -     EScitalopram oxalate (LEXAPRO) 10 MG tablet; Take 1 tablet (10 mg total) by mouth once daily.  Stable  Continue medications as prescribed.  Follow up with PCP      Follow up in about 6 months (around 8/8/2024), or if symptoms worsen or fail to improve, for fasting labs prior to next appointment.      Future Appointments       Date Provider Specialty Appt Notes    4/11/2024 DAVID Carrera, TRAY Urogynecology pessary    8/8/2024  Lab .    8/8/2024 Patrizia Ayala, NP Family Medicine 6 mo f/u             All of your core healthy metrics are met.

## 2024-04-11 ENCOUNTER — OFFICE VISIT (OUTPATIENT)
Dept: UROGYNECOLOGY | Facility: CLINIC | Age: 79
End: 2024-04-11
Payer: MEDICARE

## 2024-04-11 DIAGNOSIS — R35.0 URINARY FREQUENCY: Primary | ICD-10-CM

## 2024-04-11 DIAGNOSIS — N39.41 URGE INCONTINENCE OF URINE: ICD-10-CM

## 2024-04-11 DIAGNOSIS — N81.11 CYSTOCELE, MIDLINE: ICD-10-CM

## 2024-04-11 DIAGNOSIS — N81.9 VAGINAL VAULT PROLAPSE: ICD-10-CM

## 2024-04-11 DIAGNOSIS — N95.2 ATROPHIC VAGINITIS: ICD-10-CM

## 2024-04-11 LAB
BILIRUBIN, UA POC OHS: NEGATIVE
BLOOD, UA POC OHS: NEGATIVE
CLARITY, UA POC OHS: CLEAR
COLOR, UA POC OHS: YELLOW
GLUCOSE, UA POC OHS: NEGATIVE
KETONES, UA POC OHS: NEGATIVE
LEUKOCYTES, UA POC OHS: ABNORMAL
NITRITE, UA POC OHS: NEGATIVE
PH, UA POC OHS: 7
PROTEIN, UA POC OHS: NEGATIVE
SPECIFIC GRAVITY, UA POC OHS: 1.02
UROBILINOGEN, UA POC OHS: 0.2

## 2024-04-11 PROCEDURE — 99213 OFFICE O/P EST LOW 20 MIN: CPT | Mod: PBBFAC,PO | Performed by: NURSE PRACTITIONER

## 2024-04-11 PROCEDURE — 99213 OFFICE O/P EST LOW 20 MIN: CPT | Mod: S$PBB,,, | Performed by: NURSE PRACTITIONER

## 2024-04-11 PROCEDURE — 99999PBSHW POCT URINALYSIS(INSTRUMENT): Mod: PBBFAC,,,

## 2024-04-11 PROCEDURE — 81003 URINALYSIS AUTO W/O SCOPE: CPT | Mod: PBBFAC,PO | Performed by: NURSE PRACTITIONER

## 2024-04-11 PROCEDURE — 99999 PR PBB SHADOW E&M-EST. PATIENT-LVL III: CPT | Mod: PBBFAC,,, | Performed by: NURSE PRACTITIONER

## 2024-04-11 NOTE — PROGRESS NOTES
Subjective:       Patient ID: Josie Allen is a 78 y.o. female.    Chief Complaint: Pessary Check    HPI  Josie Allen is a 78 y.o. female.  Who presents today for routine pessary maintenance.  She was last seen in our office on 01/11/2024.  Feels that she was doing fairly well.  She denies any real vaginal complaints or concerns with the pessary.  She denies any urinary complaints or concerns at this time.  Overall, she feels that she is doing well.  She has been using her Estrace cream about 3 times a week.  She states that she will probably need a refill but she will call when she needs the refill.  She denies any other acute complaints/concerns at this time is ready to proceed with the pessary.    Review of Systems   Constitutional:  Negative for activity change, fever and unexpected weight change.   HENT:  Negative for hearing loss.    Eyes:  Negative for visual disturbance.   Respiratory:  Negative for shortness of breath and wheezing.    Cardiovascular:  Negative for chest pain, palpitations and leg swelling.   Gastrointestinal:  Negative for abdominal pain, constipation and diarrhea.   Genitourinary:  Positive for frequency. Negative for dyspareunia, dysuria, urgency, vaginal bleeding and vaginal discharge.   Musculoskeletal:  Negative for gait problem and neck pain.   Skin:  Negative for rash and wound.   Allergic/Immunologic: Negative for immunocompromised state.   Neurological:  Negative for tremors, speech difficulty and weakness.   Hematological:  Does not bruise/bleed easily.   Psychiatric/Behavioral:  Negative for agitation and confusion.        Objective:      Physical Exam  Vitals reviewed. Exam conducted with a chaperone present.   Constitutional:       General: She is not in acute distress.     Appearance: She is well-developed.   HENT:      Head: Normocephalic and atraumatic.   Neck:      Thyroid: No thyromegaly.   Pulmonary:      Effort: Pulmonary effort is normal. No respiratory  distress.   Abdominal:      Palpations: Abdomen is soft.      Tenderness: There is no abdominal tenderness.      Hernia: No hernia is present.   Musculoskeletal:         General: Normal range of motion.      Cervical back: Normal range of motion.   Skin:     General: Skin is warm and dry.      Findings: No rash.   Neurological:      Mental Status: She is alert and oriented to person, place, and time.   Psychiatric:         Mood and Affect: Mood normal.         Behavior: Behavior normal.         Thought Content: Thought content normal.       Pelvic Exam:  V: No lesions. No palpable nodes.   Va:  No discharge or bleeding.  Very mild irritation noted near the 10 o'clock position.  Dominant midline cystocele BA equals 0.  Vaginal vault prolapse to negative 4 position   Meatus:No caruncle or stenosis  Urethra: Non tender. No suburethral masses.  Cx/Cuff: Normal   Uterus:  Surgically absent  Ad: No mass or tenderness.  Levators :Symmetrical. Normal tone. Non tender.  BL: Non tender  RV: No hemorrhoids.      Assessment:       1. Urinary frequency    2. Urge incontinence of urine    3. Cystocele, midline    4. Vaginal vault prolapse    5. Atrophic vaginitis          Procedure note- #3 ring  pessary removed and cleaned with soap and water.  After betadine irrigation of the vagina, #3 ring pessary was reinserted into the vagina.  Pt ambulated in the room and denies any discomfort.  NP reminded pt that the first 24-48 hours are the most likely time for the pessary to fall out and to monitor the toilet before flushing.  Pt verbalized understanding.      Plan:       Urinary frequency monitor    Urge incontinence of urine monitor    Cystocele, midline continue with ring pessary    Vaginal vault prolapse as noted above    Atrophic vaginitis continue with Estrace cream    RTC 3 months

## 2024-06-25 DIAGNOSIS — Z96.0 PRESENCE OF PESSARY: ICD-10-CM

## 2024-06-25 DIAGNOSIS — R10.12 ABDOMINAL PAIN, LEFT UPPER QUADRANT: Primary | ICD-10-CM

## 2024-06-25 DIAGNOSIS — M62.89 PELVIC FLOOR DYSFUNCTION: ICD-10-CM

## 2024-06-25 DIAGNOSIS — K59.04 CHRONIC IDIOPATHIC CONSTIPATION: ICD-10-CM

## 2024-07-02 ENCOUNTER — HOSPITAL ENCOUNTER (OUTPATIENT)
Dept: RADIOLOGY | Facility: HOSPITAL | Age: 79
Discharge: HOME | End: 2024-07-02
Attending: INTERNAL MEDICINE
Payer: MEDICARE

## 2024-07-02 DIAGNOSIS — M62.89 PELVIC FLOOR DYSFUNCTION: ICD-10-CM

## 2024-07-02 DIAGNOSIS — Z96.0 PRESENCE OF PESSARY: ICD-10-CM

## 2024-07-02 DIAGNOSIS — K59.04 CHRONIC IDIOPATHIC CONSTIPATION: ICD-10-CM

## 2024-07-02 DIAGNOSIS — R10.12 ABDOMINAL PAIN, LEFT UPPER QUADRANT: ICD-10-CM

## 2024-07-02 LAB
CREAT SERPL-MCNC: 0.8 MG/DL (ref 0.5–1.4)
SAMPLE: NORMAL

## 2024-07-02 PROCEDURE — 74177 CT ABD & PELVIS W/CONTRAST: CPT | Mod: 26,,, | Performed by: RADIOLOGY

## 2024-07-02 PROCEDURE — 82565 ASSAY OF CREATININE: CPT | Mod: PO

## 2024-07-02 PROCEDURE — 25500020 PHARM REV CODE 255: Mod: PO | Performed by: INTERNAL MEDICINE

## 2024-07-02 RX ADMIN — IOHEXOL 100 ML: 350 INJECTION, SOLUTION INTRAVENOUS at 02:07

## 2024-07-11 ENCOUNTER — OFFICE VISIT (OUTPATIENT)
Dept: UROGYNECOLOGY | Facility: CLINIC | Age: 79
End: 2024-07-11
Payer: MEDICARE

## 2024-07-11 DIAGNOSIS — N39.41 URGE INCONTINENCE OF URINE: ICD-10-CM

## 2024-07-11 DIAGNOSIS — N95.2 ATROPHIC VAGINITIS: ICD-10-CM

## 2024-07-11 DIAGNOSIS — R35.0 URINARY FREQUENCY: Primary | ICD-10-CM

## 2024-07-11 DIAGNOSIS — K59.00 CONSTIPATION, UNSPECIFIED CONSTIPATION TYPE: ICD-10-CM

## 2024-07-11 DIAGNOSIS — Z46.89 PESSARY MAINTENANCE: ICD-10-CM

## 2024-07-11 DIAGNOSIS — N81.9 VAGINAL VAULT PROLAPSE: ICD-10-CM

## 2024-07-11 DIAGNOSIS — N81.11 CYSTOCELE, MIDLINE: ICD-10-CM

## 2024-07-11 PROCEDURE — 99213 OFFICE O/P EST LOW 20 MIN: CPT | Mod: PBBFAC,PO | Performed by: NURSE PRACTITIONER

## 2024-07-11 PROCEDURE — 99999 PR PBB SHADOW E&M-EST. PATIENT-LVL III: CPT | Mod: PBBFAC,,, | Performed by: NURSE PRACTITIONER

## 2024-07-11 RX ORDER — ESTRADIOL 0.1 MG/G
CREAM VAGINAL
Qty: 42.5 G | Refills: 3 | Status: SHIPPED | OUTPATIENT
Start: 2024-07-11

## 2024-07-11 NOTE — PROGRESS NOTES
Subjective:       Patient ID: Josie Allen is a 78 y.o. female.    Chief Complaint: Pessary Check    HPI  Josie Allen is a 78 y.o. female.  Who presents today for routine pessary maintenance.  She was last seen in our office on 04/11/2024.  She feels that the pessary is doing fairly well.  However, she has noticed that her urinary frequency and urgency have increased lately.  She does not particularly want to try any OAB medication.  She does have issues with constipation as well and we have talked about doing pelvic floor physical therapy in the past.  Patient was amenable to this idea at this time.  She is wanting a referral to pelvic floor PT to be placed.  She denies any other acute complaints/concerns at this time.  She is ready to proceed with the pessary.  She was also asking for a refill on her Estrace cream.    Review of Systems   Constitutional:  Negative for activity change, fever and unexpected weight change.   HENT:  Negative for hearing loss.    Eyes:  Negative for visual disturbance.   Respiratory:  Negative for shortness of breath and wheezing.    Cardiovascular:  Negative for chest pain, palpitations and leg swelling.   Gastrointestinal:  Negative for abdominal pain, constipation and diarrhea.   Genitourinary:  Positive for frequency and urgency. Negative for dyspareunia, dysuria, vaginal bleeding and vaginal discharge.   Musculoskeletal:  Negative for gait problem and neck pain.   Skin:  Negative for rash and wound.   Allergic/Immunologic: Negative for immunocompromised state.   Neurological:  Negative for tremors, speech difficulty and weakness.   Hematological:  Does not bruise/bleed easily.   Psychiatric/Behavioral:  Negative for agitation and confusion.        Objective:      Physical Exam  Vitals reviewed. Exam conducted with a chaperone present.   Constitutional:       General: She is not in acute distress.     Appearance: She is well-developed.   HENT:      Head:  Normocephalic and atraumatic.   Neck:      Thyroid: No thyromegaly.   Pulmonary:      Effort: Pulmonary effort is normal. No respiratory distress.   Abdominal:      Palpations: Abdomen is soft.      Tenderness: There is no abdominal tenderness.      Hernia: No hernia is present.   Musculoskeletal:         General: Normal range of motion.      Cervical back: Normal range of motion.   Skin:     General: Skin is warm and dry.      Findings: No rash.   Neurological:      Mental Status: She is alert and oriented to person, place, and time.   Psychiatric:         Mood and Affect: Mood normal.         Behavior: Behavior normal.         Thought Content: Thought content normal.       Pelvic Exam:  V: No lesions. No palpable nodes.   Va:  No discharge or bleeding.  Midline cystocele BA equals 0.  Elevation of the bladder vaginal vault prolapse to negative 2 position  Meatus:No caruncle or stenosis  Urethra: Non tender. No suburethral masses.  Cx/Cuff: Normal   Uterus:  Surgically absent  Ad: No mass or tenderness.  Levators :Symmetrical. Normal tone. Non tender.  BL: Non tender  RV: No hemorrhoids.      Assessment:       1. Urinary frequency    2. Urge incontinence of urine    3. Cystocele, midline    4. Vaginal vault prolapse    5. Pessary maintenance    6. Atrophic vaginitis    7. Constipation, unspecified constipation type          Procedure note- #3 ring pessary removed and cleaned with soap and water.  After betadine irrigation of the vagina, #3 ring pessary was reinserted into the vagina.  Pt ambulated in the room and denies any discomfort.  NP reminded pt that the first 24-48 hours are the most likely time for the pessary to fall out and to monitor the toilet before flushing.  Pt verbalized understanding.      Plan:       Urinary frequency monitor, patient will try pelvic floor PT    Urge incontinence of urine trial of pelvic floor PT  -     Ambulatory referral/consult to Physical/Occupational Therapy; Future; Expected  date: 07/18/2024    Cystocele, midline continue with ring pessary    Vaginal vault prolapse as noted above    Pessary maintenance as noted above    Atrophic vaginitis continue Estrace cream as noted below  -     estradioL (ESTRACE) 0.01 % (0.1 mg/gram) vaginal cream; Apply 1 fingertipful to vaginal area nightly x 2 weeks then use on MWF  Dispense: 42.5 g; Refill: 3    Constipation, unspecified constipation type trial of pelvic floor PT  -     Ambulatory referral/consult to Physical/Occupational Therapy; Future; Expected date: 07/18/2024          RTC 3 months

## 2024-08-08 ENCOUNTER — LAB VISIT (OUTPATIENT)
Dept: LAB | Facility: HOSPITAL | Age: 79
End: 2024-08-08
Attending: FAMILY MEDICINE
Payer: MEDICARE

## 2024-08-08 DIAGNOSIS — E78.5 HYPERLIPIDEMIA, UNSPECIFIED HYPERLIPIDEMIA TYPE: ICD-10-CM

## 2024-08-08 DIAGNOSIS — R73.09 OTHER ABNORMAL GLUCOSE: ICD-10-CM

## 2024-08-08 DIAGNOSIS — E66.9 OBESITY, CLASS I, BMI 30-34.9: ICD-10-CM

## 2024-08-08 LAB
ALBUMIN SERPL BCP-MCNC: 3.7 G/DL (ref 3.5–5.2)
ALP SERPL-CCNC: 91 U/L (ref 55–135)
ALT SERPL W/O P-5'-P-CCNC: 24 U/L (ref 10–44)
ANION GAP SERPL CALC-SCNC: 10 MMOL/L (ref 8–16)
AST SERPL-CCNC: 29 U/L (ref 10–40)
BASOPHILS # BLD AUTO: 0.04 K/UL (ref 0–0.2)
BASOPHILS NFR BLD: 0.6 % (ref 0–1.9)
BILIRUB SERPL-MCNC: 0.7 MG/DL (ref 0.1–1)
BUN SERPL-MCNC: 11 MG/DL (ref 8–23)
CALCIUM SERPL-MCNC: 10.1 MG/DL (ref 8.7–10.5)
CHLORIDE SERPL-SCNC: 103 MMOL/L (ref 95–110)
CHOLEST SERPL-MCNC: 289 MG/DL (ref 120–199)
CHOLEST/HDLC SERPL: 4.7 {RATIO} (ref 2–5)
CO2 SERPL-SCNC: 24 MMOL/L (ref 23–29)
CREAT SERPL-MCNC: 0.8 MG/DL (ref 0.5–1.4)
DIFFERENTIAL METHOD BLD: NORMAL
EOSINOPHIL # BLD AUTO: 0.4 K/UL (ref 0–0.5)
EOSINOPHIL NFR BLD: 6.9 % (ref 0–8)
ERYTHROCYTE [DISTWIDTH] IN BLOOD BY AUTOMATED COUNT: 13.3 % (ref 11.5–14.5)
EST. GFR  (NO RACE VARIABLE): >60 ML/MIN/1.73 M^2
ESTIMATED AVG GLUCOSE: 103 MG/DL (ref 68–131)
GLUCOSE SERPL-MCNC: 94 MG/DL (ref 70–110)
HBA1C MFR BLD: 5.2 % (ref 4–5.6)
HCT VFR BLD AUTO: 45 % (ref 37–48.5)
HDLC SERPL-MCNC: 62 MG/DL (ref 40–75)
HDLC SERPL: 21.5 % (ref 20–50)
HGB BLD-MCNC: 14.4 G/DL (ref 12–16)
IMM GRANULOCYTES # BLD AUTO: 0.02 K/UL (ref 0–0.04)
IMM GRANULOCYTES NFR BLD AUTO: 0.3 % (ref 0–0.5)
LDLC SERPL CALC-MCNC: 209.4 MG/DL (ref 63–159)
LYMPHOCYTES # BLD AUTO: 1.9 K/UL (ref 1–4.8)
LYMPHOCYTES NFR BLD: 31 % (ref 18–48)
MCH RBC QN AUTO: 30.6 PG (ref 27–31)
MCHC RBC AUTO-ENTMCNC: 32 G/DL (ref 32–36)
MCV RBC AUTO: 96 FL (ref 82–98)
MONOCYTES # BLD AUTO: 0.5 K/UL (ref 0.3–1)
MONOCYTES NFR BLD: 8.7 % (ref 4–15)
NEUTROPHILS # BLD AUTO: 3.3 K/UL (ref 1.8–7.7)
NEUTROPHILS NFR BLD: 52.5 % (ref 38–73)
NONHDLC SERPL-MCNC: 227 MG/DL
NRBC BLD-RTO: 0 /100 WBC
PLATELET # BLD AUTO: 154 K/UL (ref 150–450)
PMV BLD AUTO: 12.9 FL (ref 9.2–12.9)
POTASSIUM SERPL-SCNC: 4.7 MMOL/L (ref 3.5–5.1)
PROT SERPL-MCNC: 6.9 G/DL (ref 6–8.4)
RBC # BLD AUTO: 4.7 M/UL (ref 4–5.4)
SODIUM SERPL-SCNC: 137 MMOL/L (ref 136–145)
T4 FREE SERPL-MCNC: 0.91 NG/DL (ref 0.71–1.51)
TRIGL SERPL-MCNC: 88 MG/DL (ref 30–150)
TSH SERPL DL<=0.005 MIU/L-ACNC: 1.17 UIU/ML (ref 0.4–4)
WBC # BLD AUTO: 6.23 K/UL (ref 3.9–12.7)

## 2024-08-08 PROCEDURE — 84439 ASSAY OF FREE THYROXINE: CPT | Performed by: FAMILY MEDICINE

## 2024-08-08 PROCEDURE — 80053 COMPREHEN METABOLIC PANEL: CPT | Performed by: FAMILY MEDICINE

## 2024-08-08 PROCEDURE — 36415 COLL VENOUS BLD VENIPUNCTURE: CPT | Mod: PO | Performed by: FAMILY MEDICINE

## 2024-08-08 PROCEDURE — 83036 HEMOGLOBIN GLYCOSYLATED A1C: CPT | Performed by: FAMILY MEDICINE

## 2024-08-08 PROCEDURE — 85025 COMPLETE CBC W/AUTO DIFF WBC: CPT | Performed by: FAMILY MEDICINE

## 2024-08-08 PROCEDURE — 80061 LIPID PANEL: CPT | Performed by: FAMILY MEDICINE

## 2024-08-08 PROCEDURE — 84443 ASSAY THYROID STIM HORMONE: CPT | Performed by: FAMILY MEDICINE

## 2024-08-13 ENCOUNTER — OFFICE VISIT (OUTPATIENT)
Dept: FAMILY MEDICINE | Facility: CLINIC | Age: 79
End: 2024-08-13
Payer: MEDICARE

## 2024-08-13 VITALS
OXYGEN SATURATION: 98 % | HEART RATE: 74 BPM | DIASTOLIC BLOOD PRESSURE: 78 MMHG | HEIGHT: 66 IN | BODY MASS INDEX: 31.39 KG/M2 | TEMPERATURE: 98 F | SYSTOLIC BLOOD PRESSURE: 128 MMHG | WEIGHT: 195.31 LBS

## 2024-08-13 DIAGNOSIS — E66.9 OBESITY, CLASS I, BMI 30-34.9: ICD-10-CM

## 2024-08-13 DIAGNOSIS — F33.1 MODERATE EPISODE OF RECURRENT MAJOR DEPRESSIVE DISORDER: ICD-10-CM

## 2024-08-13 DIAGNOSIS — J30.89 ENVIRONMENTAL AND SEASONAL ALLERGIES: ICD-10-CM

## 2024-08-13 DIAGNOSIS — K21.9 GASTROESOPHAGEAL REFLUX DISEASE WITHOUT ESOPHAGITIS: ICD-10-CM

## 2024-08-13 DIAGNOSIS — Z76.89 ENCOUNTER TO ESTABLISH CARE: Primary | ICD-10-CM

## 2024-08-13 DIAGNOSIS — J30.1 SEASONAL ALLERGIC RHINITIS DUE TO POLLEN: ICD-10-CM

## 2024-08-13 DIAGNOSIS — E78.5 HYPERLIPIDEMIA, UNSPECIFIED HYPERLIPIDEMIA TYPE: ICD-10-CM

## 2024-08-13 PROCEDURE — 99999 PR PBB SHADOW E&M-EST. PATIENT-LVL IV: CPT | Mod: PBBFAC,,, | Performed by: STUDENT IN AN ORGANIZED HEALTH CARE EDUCATION/TRAINING PROGRAM

## 2024-08-13 PROCEDURE — 99214 OFFICE O/P EST MOD 30 MIN: CPT | Mod: S$PBB,,, | Performed by: STUDENT IN AN ORGANIZED HEALTH CARE EDUCATION/TRAINING PROGRAM

## 2024-08-13 PROCEDURE — 99214 OFFICE O/P EST MOD 30 MIN: CPT | Mod: PBBFAC,PO | Performed by: STUDENT IN AN ORGANIZED HEALTH CARE EDUCATION/TRAINING PROGRAM

## 2024-08-13 PROCEDURE — G2211 COMPLEX E/M VISIT ADD ON: HCPCS | Mod: S$PBB,,, | Performed by: STUDENT IN AN ORGANIZED HEALTH CARE EDUCATION/TRAINING PROGRAM

## 2024-08-13 RX ORDER — LORATADINE 10 MG/1
10 TABLET ORAL DAILY
Qty: 90 TABLET | Refills: 3 | Status: SHIPPED | OUTPATIENT
Start: 2024-08-13 | End: 2025-08-13

## 2024-08-13 RX ORDER — ESCITALOPRAM OXALATE 10 MG/1
10 TABLET ORAL DAILY
Qty: 90 TABLET | Refills: 3 | Status: SHIPPED | OUTPATIENT
Start: 2024-08-13 | End: 2025-08-13

## 2024-08-13 RX ORDER — FLUTICASONE PROPIONATE 50 MCG
2 SPRAY, SUSPENSION (ML) NASAL DAILY
Qty: 18.2 ML | Refills: 5 | Status: SHIPPED | OUTPATIENT
Start: 2024-08-13

## 2024-08-13 NOTE — PATIENT INSTRUCTIONS
Kal Galindo,     If you are due for any health screening(s) below please notify me so we can arrange them to be ordered and scheduled. Most healthy patients at your age complete them, but you are free to accept or refuse.     If you can't do it, I'll definitely understand. If you can, I'd certainly appreciate it!    All of your core healthy metrics are met.              The Scoop on Statins: Things You Should Know.       Heart disease is the number one cause of death in the [place]. Around 1 in 20 adults aged 20 and older have coronary artery disease [1] and every 33 seconds a person dies from cardiovascular disease in the [place] [2]. The good news is that you can reduce your risk.    What are statins and how do they work?    Statins are a medication that is effective at lowering cholesterol and your risk of heart attack and stroke.     Statins lower low-density lipoprotein (LDL) cholesterol known as the bad cholesterol. Over time, just like the pipes in your house, your arteries or pipes can become filled with waxy cholesterol plaques that can build up and block blood flow to your heart. This can lead to heart attacks and strokes. Statins can draw the cholesterol out of plaques and slow down the production of cholesterol in the liver.       Figure 1: Reproduced from: What Is Atherosclerosis? National Heart, Lung, and Blood [place]. Available at http://www.nhlbi.nih.gov/health/health-topics/topics/atherosclerosis/.    What are the benefits and potential risks of statins?    Statins can decrease the risk of major heart events by 31% and have a 21% decrease in death from heart disease [4]. In addition to lowering bad cholesterol, status increase your HDL or good cholesterol. They also are known to have anti-inflammatory properties, decrease the risk of blood clots, and improve the lining of blood vessels [5].     Statins are safe and effective for most people, but there are some rare side effects  that impact a small number of patients. Most patients do not experience any side effects and up to 90% of patients do not experience muscle aches. Statins may also mildly increase glucose levels [6]. If a patient experiences side effects your doctor might try a different statin, lower dose, or decrease the frequency.     There are some common misconceptions about the potential side effects and risks of taking statins which have been debunked in scientific studies. Common myths include statins having a negative effect on liver health and cognitive health. Scientific studies monitoring thousands of patients have shown that statins do not cause dementia. Use of statins can improve your long-term cognitive and overall health by reducing your risk of stroke and heart disease.     Who are prescribed statins?    Statins are used to treat patients with high cholesterol and many doctors prescribe statins for people at risk of heart disease and stroke. Statins are also recommended for patients with diabetes, those with elevated coronary calcium scores and patients with a history of stroke, heart attack, and other cardiovascular events.     Talk to your doctor about your risk and whether statins might be right for you.    References    1.   Saúl CW, Steve AW, Ayah ZI, José Miguel CAM, Yaneli P, Rl CL, Iesha CM, Farnaz AZ, Kalin AK, [place] AE, [place] Y, Elkind MSV, Ingrid KR, Yuri-Jimm C, Cooper S, Errol G, Mary Ann DG, Hiremath S, Charles JE, Moni R, Susan DS, Wenceslao D, Tj DA, Mukesh J, Ma J, Magnani JW, Vale ED, Joaquin ME, Tessa SD, Erlin NI, Daren R, Anand M, Rob GA, Alexey NS, Jairo MP, Christy EL, Angle SS, Angela JH, Jermaine NY, Kamran ND, Kamran SS, Kisha K, Venkat SS; American Heart Association [place] on Epidemiology and Prevention Statistics Committee and Stroke Statistics Subcommittee. Heart Disease and Stroke Statistics-2023 Update: A Report From the American Heart Association. Circulation.  2023 Feb 21;147(8):j03-f118. doi: 10.1161/CIR.[id number]. Epub 2023 Jan 25. Erratum in: Circulation. 2023 Feb 21;147(8):e622. Erratum in: Circulation. 2023 Jul 25;148(4):e4. PMID: 06490364.  2. [facility] for [facility] Statistics. Multiple Cause of Death 5771-7251 on [facility] WONDER Database. Accessed October 28, 2023.  3. Makenzie Diaz, Vianney MCCRACKEN. Exploitation of Aspergillus terreus for the Production of Natural Statins. J Fungi ([place]). 2016 Apr 30;2(2):13. doi: 10.3390/ypz3812009. PMID: 25242283; PMCID: NEF1296152.  4. Maryellen FAITH, Vini J, Sandra S. Effect of statins on risk of coronary disease: a meta-analysis of randomized controlled trials. ARABELLA. 1999 Dec 22-29;282(24):2340-6. doi: 10.1001/arabella.282.24.2340. PMID: 02190605.  5. [place] I, Casal-Garcia M, Robert AL. Statins: pros and cons. Med Clin ([place]). 2018 May 23;150(10):398-402. doi: 10.1016/j.medcli.2017.11.030. Epub 2017 Dec 29. PMID: 37498930; PMCID: CHH2290180.  6. Carter S, Suly A, Suly S. Statin Therapy: Review of Safety and Potential Side Effects. Acta Cardiol Sin. 2016 Nov;32(6):631-639. doi: 10.6515/krv16835327d. PMID: 26029642; PMCID: KGP9366983.

## 2024-08-13 NOTE — PROGRESS NOTES
[facility] Note    Subjective:  Chief Complaint:   Chief Complaint   Patient presents with    Establish Care       History of Present Illness:  Josie is here for establishing care    Depression and anxiety   Escitalopram 10 - considering weaning off     Palpitations  MVP     HLD  ASCVD >10%  Discussed statin therapy - refused   D/c statin years ago s/p muscle aches     GERD  Esomeprazole 20 - will take 14 days and then stops until needed again   Tums prn   Has GI follow up in Sept     Allergies  Loratadine 10     Reports adverse reaction and rectal pain since colonoscopy (7/19/2024) [place]   Lubiprostone 24 bid - d/c 2/2 se   Miralx no longer helping   Increasing fiber     Pessary   Oxyquinoline-sod.lauryl sulfat     DEXA reports mild thinning, refused follow up at this time   Colonoscopy 7/17/24 no repeat rec 2/2 age  Recommend COVID, RSV, Shingles, Pneumo vaccinations. Refused at this time     Screening  Health Maintenance         Date Due Completion Date    DEXA Scan Never done ---    Shingles Vaccine (1 of 2) Never done ---    RSV Vaccine (Age 60+ and Pregnant patients) (1 - 1-dose 60+ series) Never done ---    Pneumococcal Vaccines (Age 65+) (2 of 2 - PCV) 08/27/2014 8/27/2013    COVID-19 Vaccine (3 - 2023-24 season) 09/01/2023 3/4/2021    TETANUS VACCINE 05/08/2024 5/8/2014    Influenza Vaccine (1) 09/01/2024 10/6/2020    Lipid Panel 08/08/2029 8/8/2024          Immunization History   Administered Date(s) Administered    COVID-19, MRNA, LN-S, PF (Pfizer) (Purple Cap) 02/11/2021, 03/04/2021    Influenza - Quadrivalent - PF *Preferred* (6 months and older) 10/10/2013    Influenza - Trivalent (ADULT) 10/31/2012    Pneumococcal Polysaccharide - 23 Valent 08/27/2013    Tetanus 05/08/2014     The 10-year ASCVD risk score ([place] MCCLELLAND, et al., 2019) is: 24%    Values used to calculate the score:      Age: 78 years      Sex: Female      Is Non- : No      Diabetic: No       Tobacco smoker: No      Systolic Blood Pressure: 128 mmHg      Is BP treated: No      HDL Cholesterol: 62 mg/dL      Total Cholesterol: 289 mg/dL    Allergies:  Review of patient's allergies indicates:   Allergen Reactions    Penicillins Nausea And Vomiting    Sulfasalazine Nausea And Vomiting       Home Medications:  Current Outpatient Medications on File Prior to Visit   Medication Sig    clobetasoL (TEMOVATE) 0.05 % cream Apply topically 2 (two) times daily. for 15 days    esomeprazole (NEXIUM) 20 MG capsule Take 20 mg by mouth before breakfast.    estradioL (ESTRACE) 0.01 % (0.1 mg/gram) vaginal cream Apply 1 fingertipful to vaginal area nightly x 2 weeks then use on MWF    nystatin (NYSTOP) powder Apply topically 3 (three) times daily. for 7 days    oxyquinoline-sod.lauryl sulfat 0.025-0.01 % Gel Place 1 applicatorful to the vaginal area nightly as needed    polyethylene glycol (GLYCOLAX) 17 gram PwPk Take 17 g by mouth.    [DISCONTINUED] EScitalopram oxalate (LEXAPRO) 10 MG tablet Take 1 tablet (10 mg total) by mouth once daily.    [DISCONTINUED] fluticasone propionate (FLONASE) 50 mcg/actuation nasal spray 2 sprays (100 mcg total) by Each Nostril route once daily.    [DISCONTINUED] loratadine (CLARITIN) 10 mg tablet Take 1 tablet (10 mg total) by mouth once daily.    [DISCONTINUED] diclofenac sodium (VOLTAREN) 1 % Gel Apply 2 g topically 3 (three) times daily. (Patient not taking: Reported on 8/13/2024)     No current facility-administered medications on file prior to visit.       Past Medical History:   Diagnosis Date    Hyperlipidemia     IBS (irritable bowel syndrome)      Past Surgical History:   Procedure Laterality Date    HYSTERECTOMY      TONSILLECTOMY       No family history on file.  Social History     Tobacco Use    Smoking status: Never    Smokeless tobacco: Never   Substance Use Topics    Alcohol use: No    Drug use: No            The patient's past medical history, surgical history, social  "history, family history, allergies and medications have been reviewed.    Review of Systems     10 point review of systems was conducted and only the pertinent positives and pertinent negatives are noted above in the HPI section.    Physical Examination  General appearance: alert, cooperative, no distress  HEENT: normocephalic, atraumatic, PERRLA, TMs visualized bilaterally not impacted by cerumen,  oropharynx mucosa pink and moist without tonsillar exudates  Neck: trachea midline, no LAD, no thyromegaly, no neck stiffness  Lungs: clear to auscultation, no wheezes, rales or rhonchi, symmetric air entry  Heart: normal rate, regular rhythm, normal S1, S2, no murmurs, rubs, clicks or gallops  Abdomen: soft, nontender, nondistended  Skin: No rashes or abnormal skin lesions, no apparent jaundice or bruising  Extremities: Full ROM of all extremities, peripheral pulses normal, no unilateral leg swelling or calf tenderness   Neurological:alert, oriented, normal speech, no new focal findings or movement disorder noted from baseline      BP Readings from Last 3 Encounters:   08/13/24 128/78   02/08/24 138/76   08/08/23 126/78     Wt Readings from Last 3 Encounters:   08/13/24 88.6 kg (195 lb 5.2 oz)   02/08/24 90.6 kg (199 lb 11.8 oz)   08/08/23 89.4 kg (197 lb)     /78 (BP Location: Left arm, Patient Position: Sitting, BP Method: Large (Manual))   Pulse 74   Temp 97.9 °F (36.6 °C) (Oral)   Ht 5' 6" (1.676 m)   Wt 88.6 kg (195 lb 5.2 oz)   SpO2 98%   BMI 31.53 kg/m²    274}  Laboratory: I have reviewed old labs below:    274}    Lab Results   Component Value Date    WBC 6.23 08/08/2024    HGB 14.4 08/08/2024    HCT 45.0 08/08/2024    MCV 96 08/08/2024     08/08/2024     08/08/2024    K 4.7 08/08/2024     08/08/2024    CALCIUM 10.1 08/08/2024    CO2 24 08/08/2024    GLU 94 08/08/2024    BUN 11 08/08/2024    CREATININE 0.8 08/08/2024    EGFRNORACEVR >60.0 08/08/2024    ANIONGAP 10 08/08/2024    " PROT 6.9 08/08/2024    ALBUMIN 3.7 08/08/2024    BILITOT 0.7 08/08/2024    ALKPHOS 91 08/08/2024    ALT 24 08/08/2024    AST 29 08/08/2024    CHOL 289 (H) 08/08/2024    TRIG 88 08/08/2024    HDL 62 08/08/2024    LDLCALC 209.4 (H) 08/08/2024    TSH 1.167 08/08/2024    HGBA1C 5.2 08/08/2024      Lab reviewed by me: Particular labs of significance that I will monitor, workup, or treat to improve are mentioned below in diagnostic impression remarks.    Imaging/EKG: I have reviewed the pertinent results and my findings are noted in remarks.  274}    CC:   Chief Complaint   Patient presents with    Establish Care        274}    Assessment/Plan  Josie Allen is a 78 y.o. female who presents to clinic with:  1. Encounter to establish care    2. Hyperlipidemia, unspecified hyperlipidemia type    3. Moderate episode of recurrent major depressive disorder    4. Gastroesophageal reflux disease without esophagitis    5. Seasonal allergic rhinitis due to pollen    6. Environmental and seasonal allergies    7. Obesity, Class I, BMI 30-34.9       274}  Diagnostic Impression Remarks       78 y.o. female who presents to clinic today for establishing care    This is the extent of this pleasant patient's concerns at this present time.  I also discussed the importance of close follow up to discuss labs, change or modify her medications if needed, monitor side effects, and further evaluation of medical problems.     Additional workup planned: see labs ordered below.    See below for labs and meds ordered with associated diagnosis      1. Encounter to establish care  - CBC Auto Differential; Future  - Comprehensive Metabolic Panel; Future  - Lipid Panel; Future    2. Hyperlipidemia, unspecified hyperlipidemia type  - CBC Auto Differential; Future  - Comprehensive Metabolic Panel; Future  - Lipid Panel; Future    3. Moderate episode of recurrent major depressive disorder  - EScitalopram oxalate (LEXAPRO) 10 MG tablet; Take 1 tablet  (10 mg total) by mouth once daily.  Dispense: 90 tablet; Refill: 3  Reports anxiety affecting diarrhea symptoms, will continue lexapro at this time     4. Gastroesophageal reflux disease without esophagitis  Stable Continue current regimen   Avoid triggers     5. Seasonal allergic rhinitis due to pollen  Stable Continue current regimen     6. Environmental and seasonal allergies  - loratadine (CLARITIN) 10 mg tablet; Take 1 tablet (10 mg total) by mouth once daily.  Dispense: 90 tablet; Refill: 3  - fluticasone propionate (FLONASE) 50 mcg/actuation nasal spray; 2 sprays (100 mcg total) by Each Nostril route once daily.  Dispense: 18.2 mL; Refill: 5    7. Obesity, Class I, BMI 30-34.9  - CBC Auto Differential; Future  - Comprehensive Metabolic Panel; Future  - Lipid Panel; Future    I recommend the following therapeutic lifestyle changes:     Transition to a low salt, primarily plant-based diet which emphasizes:  Increase fiber with vegetables, whole grains, legumes   Increase lean protein by eating beans, legumes, fish, poultry, eggs, bison  Good dairy choices for lean protein include greek yogurt (low sugar options) and cottage cheese  Replacing butter with healthy fats, such as olive oil and nuts  Using herbs and spices instead of salt to flavor foods   Limiting red meat to no more than a few times a month   Limit added sugars (sodas, fruit juices, fancy coffee drinks)  Limit processed foods        Initiation of a graded aerobic exercise plan (goal 30-45 minutes per day 4-5 times per week)  Patient encouraged to participate in low intensity strength exercising and if unfamiliar with strength and conditioning training, I recommend joining a gym with access to a  to further instruct the patient.      If weight/obesity is a concern a reasonable weight loss goal of 1-2lbs per month to reach a goal of 5-10% weight loss in 5-6 months, or a BMI less than 25.          RTC annually or sahara RICK  MD Manuel, [facility]   Family Medicine Physician  08/13/2024      If you are due for any health screening(s) below please notify me so we can arrange them to be ordered and scheduled to maintain your health.     You are up to date for your primary preventive health care, and there are no reminders at this time.                    The following information is provided to all patients.  This information is to help you find resources for any of the problems found today that may be affecting your health:                Living healthy guide: www.[facility].[place].[facility]       Understanding Diabetes: www.diabetes.org       Eating healthy: www.cdc.gov/healthyweight      [facility] home safety checklist: www.cdc.gov/steadi/patient.html      Agency on Aging: www.goea.[place].[facility]       Alcoholics anonymous (AA): www.aa.org      Physical Activity: www.shiv.nih.gov/do3bvkt       Tobacco use: www.quitwithusla.org

## 2024-09-25 DIAGNOSIS — Z78.0 MENOPAUSE: ICD-10-CM

## 2024-10-09 ENCOUNTER — OFFICE VISIT (OUTPATIENT)
Dept: UROGYNECOLOGY | Facility: CLINIC | Age: 79
End: 2024-10-09
Payer: MEDICARE

## 2024-10-09 DIAGNOSIS — R35.0 URINARY FREQUENCY: Primary | ICD-10-CM

## 2024-10-09 DIAGNOSIS — N81.11 CYSTOCELE, MIDLINE: ICD-10-CM

## 2024-10-09 DIAGNOSIS — N39.41 URGE INCONTINENCE OF URINE: ICD-10-CM

## 2024-10-09 DIAGNOSIS — Z46.89 PESSARY MAINTENANCE: ICD-10-CM

## 2024-10-09 DIAGNOSIS — K59.00 CONSTIPATION, UNSPECIFIED CONSTIPATION TYPE: ICD-10-CM

## 2024-10-09 DIAGNOSIS — N95.2 ATROPHIC VAGINITIS: ICD-10-CM

## 2024-10-09 DIAGNOSIS — N81.9 VAGINAL VAULT PROLAPSE: ICD-10-CM

## 2024-10-09 PROCEDURE — 99999 PR PBB SHADOW E&M-EST. PATIENT-LVL III: CPT | Mod: PBBFAC,,, | Performed by: NURSE PRACTITIONER

## 2024-10-09 PROCEDURE — 99213 OFFICE O/P EST LOW 20 MIN: CPT | Mod: PBBFAC,PO | Performed by: NURSE PRACTITIONER

## 2024-10-09 NOTE — PROGRESS NOTES
Subjective:       Patient ID: Josie Allen is a 79 y.o. female.    Chief Complaint: Pessary Check    HPI  Josie Allen is a 79 y.o. female.  Who presents today for routine pessary maintenance.  She was last seen in our office on 07/11/2024.  She feels that the pessary has done fairly well for her.  She denies any real complaints or concerns with it.  At her last visit we did send in an order for pelvic floor PT. Unfortunately, the patient was not able to get involved with PT at that time.  Things have settled down somewhat and she would like to try this again.  She has been taking daily MiraLax and Flax seed which has been helping the constipation some.  She denies any real changes in her urinary symptoms.  She is ready to proceed with the pessary today.    Review of Systems   Constitutional:  Negative for activity change, fever and unexpected weight change.   HENT:  Negative for hearing loss.    Eyes:  Negative for visual disturbance.   Respiratory:  Negative for shortness of breath and wheezing.    Cardiovascular:  Negative for chest pain, palpitations and leg swelling.   Gastrointestinal:  Positive for constipation. Negative for abdominal pain and diarrhea.   Genitourinary:  Positive for frequency and urgency. Negative for dyspareunia, dysuria, vaginal bleeding and vaginal discharge.   Musculoskeletal:  Negative for gait problem and neck pain.   Skin:  Negative for rash and wound.   Allergic/Immunologic: Negative for immunocompromised state.   Neurological:  Negative for tremors, speech difficulty and weakness.   Hematological:  Does not bruise/bleed easily.   Psychiatric/Behavioral:  Negative for agitation and confusion.        Objective:      Physical Exam  Vitals reviewed. Exam conducted with a chaperone present.   Constitutional:       General: She is not in acute distress.     Appearance: She is well-developed.   HENT:      Head: Normocephalic and atraumatic.   Neck:      Thyroid: No  thyromegaly.   Pulmonary:      Effort: Pulmonary effort is normal. No respiratory distress.   Abdominal:      Palpations: Abdomen is soft.      Tenderness: There is no abdominal tenderness.      Hernia: No hernia is present.   Musculoskeletal:         General: Normal range of motion.      Cervical back: Normal range of motion.   Skin:     General: Skin is warm and dry.      Findings: No rash.   Neurological:      Mental Status: She is alert and oriented to person, place, and time.   Psychiatric:         Mood and Affect: Mood normal.         Behavior: Behavior normal.         Thought Content: Thought content normal.       Pelvic Exam:  V: No lesions. No palpable nodes.   Va:  No discharge or bleeding.  Some irritation noted near the 7:00 o'clock and 1:00 o'clock positions.  These were cauterized with silver nitrate.  Dominant midline cystocele BA equals -1.  With elevation of the bladder vaginal vault prolapse to negative 3 position  Meatus:No caruncle or stenosis  Urethra: Non tender. No suburethral masses.  Cx/Cuff: Normal   Uterus:  Surgically absent  Ad: No mass or tenderness.  Levators :Symmetrical. Normal tone. Non tender.  BL: Non tender  RV: No hemorrhoids.      Assessment:       1. Urinary frequency    2. Urge incontinence of urine    3. Cystocele, midline    4. Vaginal vault prolapse    5. Pessary maintenance    6. Atrophic vaginitis    7. Constipation, unspecified constipation type          Procedure note- #3 ring  pessary removed and cleaned with soap and water.  After betadine irrigation of the vagina, #3 ring pessary was reinserted into the vagina.  Pt ambulated in the room and denies any discomfort.  NP reminded pt that the first 24-48 hours are the most likely time for the pessary to fall out and to monitor the toilet before flushing.  Pt verbalized understanding.      Plan:       Urinary frequency trial of pelvic floor PT as noted below  -     Ambulatory referral/consult to Physical/Occupational  Therapy; Future; Expected date: 10/16/2024    Urge incontinence of urine pelvic floor PT as noted below  -     Ambulatory referral/consult to Physical/Occupational Therapy; Future; Expected date: 10/16/2024    Cystocele, midline continue with ring pessary    Vaginal vault prolapse continue with ring pessary    Pessary maintenance as noted above    Atrophic vaginitis continue with Estrace cream    Constipation, unspecified constipation type  -     Ambulatory referral/consult to Physical/Occupational Therapy; Future; Expected date: 10/16/2024        RTC 3 months

## 2024-11-06 ENCOUNTER — CLINICAL SUPPORT (OUTPATIENT)
Dept: REHABILITATION | Facility: HOSPITAL | Age: 79
End: 2024-11-06
Payer: MEDICARE

## 2024-11-06 DIAGNOSIS — R35.0 URINARY FREQUENCY: ICD-10-CM

## 2024-11-06 DIAGNOSIS — K59.00 CONSTIPATION, UNSPECIFIED CONSTIPATION TYPE: ICD-10-CM

## 2024-11-06 DIAGNOSIS — N39.41 URGE INCONTINENCE OF URINE: ICD-10-CM

## 2024-11-06 PROCEDURE — 97530 THERAPEUTIC ACTIVITIES: CPT | Mod: PO

## 2024-11-06 PROCEDURE — 97162 PT EVAL MOD COMPLEX 30 MIN: CPT | Mod: PO

## 2024-11-06 NOTE — PLAN OF CARE
[facility]   Physical Therapy Initial Evaluation        Date: 2024   Name: Josie Maxwell [facility]  Clinic Number: [id number]    Therapy Diagnosis:   Encounter Diagnoses   Name Primary?    Urinary frequency     Urge incontinence of urine     Constipation, unspecified constipation type      Physician: DAVID Carrera,*    Physician Orders: PT Eval and Treat   Medical Diagnosis from Referral: Urinary frequency [R35.0], Urge incontinence of urine [N39.41], Constipation, unspecified constipation type [K59.00]   Evaluation Date: 2024  Authorization Period Expiration: 24  Plan of Care Expiration: 2025  Progress Note Due: 2024  Visit # / Visits authorized:    FOTO: 1/3    Precautions: Standard     Time In: 10:40 am  Time Out: 11:35 am  Total Appointment Time (timed & untimed codes): 55 minutes    SUBJECTIVE       Date of onset: years, since she was maybe a teenager that worsened with children     History of current condition - Josie reports: has a colonoscopy with Dr. Fisher who suggested she come to pelvic floor therapy. Did good with the colonoscopy, only had two small polyps. She usually does good during the day unless her bladder is full and she thinks about having to urinate. Does not wear a pad during the day, but does at night. When the urge wakes her up at night, she can not hold it to the bathroom. After her babies, she was told to do a lot of Kegel's.  Reports she is uncomfortable if she does not have a bowel movement everyday. States she does not like being on laxatives or using enemas.     OB/GYN History:    vaginal delivery, both over 10lbs.   Hysterectomy? Yes  Oophorectomy? No  Using vaginal estrogen cream: Yes, a few years and is consistent with it. Does get headaches with using this sometimes in addition with the gel for her pH.   Sexually active? No, never had pain unless there was dryness   Pelvic Pain with: only with doctor inserting or  "taking the pessary out- goes every three months. Nothing outside of that  Pelvic pressure? Yes- this is why she got the hysterectomy     Bladder/Bowel History:   Frequency of urination:   Daytime: variable with fluids, more frequent than every 2 hours           Nighttime: 2-3x/night   Difficulty initiating urine stream: No  Do you use the bathroom "just in case"? Yes  Urine stream: strong  Complete emptying: not sure- may not always empty  Post-micturition dribble? Yes  Bladder leakage: Yes  Activities that cause leakage: ELYSSA with a full bladder  Frequency of incidents: mostly daily  Amount leaked (urine): few drops, large squirt, and full emptying  Urinary Urgency: Yes.  Able to delay the urge for at least <1 minute(s).  Pain with delaying the urge to urinate: No , just pressure     Frequency of bowel movements: once a day  Difficulty initiating BM: Yes  Quality/Shape of BM: [place] Stool Chart type 1 but can be 5 with MiraLax   Does Patient Feel Empty after BM? Sometimes if on fiber and MiraLax  Bowel Urgency: Yes with MiraLax Able to delay the urge for at least <3 minute(s).  Fiber Supplements or Laxative Use? Had been on miralax for years. Mixing Miralax and flax seed has her too loose. Did not use this week much.    Pain with BM: No     Bleeding with BM: No   Colon leakage: Yes with soft stools , can mostly hold back formed stools but will have occasional type 1 that comes out on its own.   Frequency of incidents: only when she only takes Miralax, not daily   Amount leaked (bowels): small amount      Form of protection: pad  Number of pads required in 24 hours: when she wakes up at night     Types of fluid intake: Water: 4-5 glassess of 10 ounces/day; 1 cup of coffee at breakfast. 1 cup of tea at lunch  Diet: changing, rarely eats beef anymore unless she craves it. Eats beans, rice, pastas, salmon. Mayne not enough vegetables   Habitus: overweight  Abuse/Neglect: none reported      Pain:  Location: bilateral " knee pain; not pelvic pain     Medical History: Josie  has a past medical history of Hyperlipidemia and IBS (irritable bowel syndrome).     Surgical History: Josie Allen  has a past surgical history that includes Hysterectomy and Tonsillectomy.    Medications: Josie has a current medication list which includes the following prescription(s): clobetasol, escitalopram oxalate, esomeprazole, estradiol, fluticasone propionate, loratadine, nystatin, oxyquinoline-sod.lauryl sulfat, and polyethylene glycol.    Allergies:   Review of patient's allergies indicates:   Allergen Reactions    Penicillins Nausea And Vomiting    Sulfasalazine Nausea And Vomiting        Imaging: None reported for this condition     Prior Therapy/Previous treatment included: None reported for this condition   Social History/Living Arrangements: lives alone  Current exercise: not lately, heat halted her and traveling  Occupation: not working   Prior Level of Function: Pt was independent with all ADLs and iADLs without pain, no reports of incontinence of bowel or bladder.  Current Level of Function: Urinary and bowel dysfunction and incontinence impacting ADLs and iADLs    Constitutional Symptoms Review: The patient denies having any constitutional symptoms.       Pts goals: to be able to have normal bowel movements and a little more control over the bladder    OBJECTIVE     See EMR under MEDIA for written consent provided 11/6/2024  Chaperone: deferred secondary to time constraints     ORTHO SCREEN  Posture in sitting: sacral sitting  Posture in standing: forward and rounded shoulders  and increased kyphosis  Pelvic alignment: pelvic obliquity noted   SI Joint Palpation: Tenderness to the right SI joint palpation. Tenderness to the left SI joint palpation.  Adductor Palpation: decreased tissue extensibility    Squat: decreased weight-bearing in heels    Gait Analysis: decreased stride length  and decreased ayesha  ; decreased hip flexion  B      Balance Reactions:    Static standing: good   Dynamic standing:  fair       ABDOMINAL WALL ASSESSMENT  Abdominal strength: Rectus abdominus: poor     Transverse abdominus: able to tighten lower abdominals - compensations present  Pelvic Floor Muscle and Transverse Abdominus Synergy: present but partial    BREATHING MECHANICS ASSESSMENT   Thorax Assessment During Quiet Respiration: WNL excursion of ribcage and WNL excursion of abdominal wall  Thorax Assessment During Deep Respiration: paradoxical (abdominal wall sucks in during inhalation)    VAGINAL PELVIC FLOOR EXAM    Deferred secondary to time constraints     Intake Outcome Measures for FOTO  Urinary Problem Survey    Therapist reviewed FOTO scores for Josie Allen on 11/6/2024.     FOTO report- see Media section in Epic or account episode details in FOTO.      Intake Score:   46         TREATMENT        Treatment Time In: 11:20 am  Treatment Time Out: 11:35 am  Total Treatment time (time-based codes) separate from Evaluation: 15 minutes    Therapeutic Activity Patient participated in dynamic functional therapeutic activities to improve functional performance for 15 minutes. Including: Education as described below.     [x] bladder irritants, anatomy/physiology of pelvic floor, posture/body mechanices, hygiene of pelvic floor, diaphragmatic breathing, double voiding techniques, isometric abdominal exercises, kegels, fluid intake/dietary modifications, and behavior modifications   [x] Instruction on diaphragmatic breathing   [x] Education on visual cues/mental imagery for pelvic floor relaxation  [x] Education on visual cues/mental imagery for pelvic floor activation   [] Instruction on the Knack for reduction of stress urinary incontinence (provide in future visit)  [x] Pelvic anatomy edu and impact on current level of function   [x] HEP building      Written Home Exercises and Education Provided: yes. Exercises were reviewed and Josie was able to  demonstrate them prior to the end of the session.  Josie demonstrated good  understanding of the education provided. See EMR under Patient Instructions for exercises and education provided during therapy sessions.      ASSESSMENT     Josie is a 79 y.o. female referred to outpatient Physical Therapy with a medical diagnosis of Urinary frequency [R35.0], Urge incontinence of urine [N39.41], Constipation, unspecified constipation type [K59.00] . Pt presents with altered posture, poor knowledge of body mechanics and posture, poor trunk stability, decreased pelvic muscle strength, decreased endurance of the pelvic muscles, increased frequency of urination, increased nocturia, poor coordination of pelvic floor muscles during ADL's leading to urinary or fecal leakage, poor fluid intake, poor diet, dysfunctional defecation, and unable to co-contract or co-relax abdominal wall and pelvic floor muscles. Upon assessment, abdominopelvic weakness is present in addition to coordination deficits. Patient will benefit from pelvic assessment in follow up visit to further individualize current plan of care.      Pt prognosis is Good.   Pt will benefit from skilled outpatient Physical Therapy to address the deficits stated above and in the chart below, provide pt/family education, and to maximize pt's level of independence.     Plan of care discussed with patient: Yes  Pt's spiritual, cultural and educational needs considered and patient is agreeable to the plan of care and goals as stated below:     Anticipated Barriers for therapy: commute, scheduling    Medical Necessity is demonstrated by the following  History  Co-morbidities and personal factors that may impact the plan of care [] LOW: no personal factors / co-morbidities  [] MODERATE: 1-2 personal factors / co-morbidities  [x] HIGH: 3+ personal factors / co-morbidities    Moderate / High Support Documentation:   Co-morbidities affecting plan of care: as noted in medical  record    Personal Factors:   coping style  lifestyle     Examination  Body Structures and Functions, activity limitations and participation restrictions that may impact the plan of care [] LOW: addressing 1-2 elements  [] MODERATE: 3+ elements  [x] HIGH: 4+ elements (please support below)    Moderate / High Support Documentation: as noted in medical record     Clinical Presentation [] LOW: stable  [x] MODERATE: Evolving  [] HIGH: Unstable     Decision Making/ Complexity Score: moderate         Goals:  Short Term Goals: 6 weeks   - Pt will demonstrate excellent knowledge and adherence to HEP to facilitate optimal recovery.  - Pt will demonstrate proper PFM contraction, relaxation, and lengthening coordinated with TA and breath for improved muscle coordination needed for functional activity.    Long Term Goals: 12 weeks   - Pt will demonstrate excellent knowledge and adherence to HEP for continued self-maintenance of symptoms.  - Pt will report FOTO Urinary Problem score of 54% or better indicating clinically relevant increase in function.  - Pt will report voiding interval of 2-3 hours for improved ADL tolerance.  - Pt will report ability to delay urinary urge for at least 15 minutes to maintain continence with ADL/IADLs.   - Pt will report improvements in incidence of urinary or fecal  incontinence by 60-70%for  improved hygiene and ADL/IADL tolerance.   - Pt will report needing </= 1 pad/day (including the night) indicating improved PFM function needed to maintain continence  - Pt will demonstrate PFM strength of at least 3/5 MMT for improved strength needed to maintain continence.   - Pt will report bearing down appropriately 100% of the time for improved bowel function and decreased stress on adjacent pelvic structures.   - Pt will demonstrate independence with pressure-management strategies to decreased stress on adjacent pelvic structures.       PLAN     Plan of care Certification: 11/6/2024 to  1/29/2025.    Outpatient Physical Therapy 1-2 times weekly for 12 weeks to include the following interventions: therapeutic exercises, therapeutic activity, neuromuscular re-education, gait training, manual therapy, modalities PRN, patient/family education, and self care/home management, and  dry needling.     Next visit: pelvic assessment as soon as possible, both urinary control and improved defecation mechanics to be worked on. Strength and coordination training with review of HEP    Ayesha Aden, PT

## 2024-11-06 NOTE — PATIENT INSTRUCTIONS
"Home Exercise Program: 11/06/2024     Increase your water intake to 5-6 of those glasses you have at home  Try elevating the legs 1-2 hours before bed to help process more urine then do the double voiding (below) before bed    DOUBLE VOIDING - Double voiding is a technique that may assist the bladder to empty more effectively when  urine is left in the bladder. It involves passing  urine more than once each time that you go to  the toilet. This makes sure that the bladder is  completely empty.    Here are 3 strategies you can try to fully empty your bladder.  You do not have to do all of these things every single time. Find which ones work best for you.     Check to make sure your pelvic floor is relaxed   Do a body scan - make sure your legs, buttocks, and abdominals are relaxed.  Take a couple deep, slow breaths to encourage your pelvic floor muscles to DROP (ie. Try Diaphragmatic Breathing).  Gently apply pressure over your bladder.  Change your pelvic position: lean forward, rock your pelvis forward and backward, stand up then sit back down.     Wait at least 30 seconds to 1 minute to see if a second urine stream begins.     Do not stop your urine stream or push/strain!    Home Exercise Program: 11/06/2024    "Roll for Control"  Strategies to Delay Voiding    What to do when you feel like you "gotta go gotta go!"     Stop what you are doing.    Take a few good deep breaths (this settles down your nervous system and relaxes your bladder).    WHILE YOU CONTINUE DEEP BREATHING, activate your pelvic floor by performing several quick contractions and releases.  (Pelvic floor contractions send a message to the bladder to relax and hold urine.)  Sitting: Roll thigh in and out slowly or squeeze knees together  Standing: Roll thigh in/out slowly and gently    As you do the above, you can also count backwards from 100 (to help get your mind off the urge!).       After the urge to void has quietly, you may be able to " "process with your activity OR if it has been approximately 3 hours since you've voided, you may choose to go to the bathroom and void.        Remember......"Control your bladder before it controls YOU!"            "

## 2024-11-19 ENCOUNTER — CLINICAL SUPPORT (OUTPATIENT)
Dept: REHABILITATION | Facility: HOSPITAL | Age: 79
End: 2024-11-19
Payer: MEDICARE

## 2024-11-19 ENCOUNTER — DOCUMENTATION ONLY (OUTPATIENT)
Dept: REHABILITATION | Facility: HOSPITAL | Age: 79
End: 2024-11-19
Payer: MEDICARE

## 2024-11-19 DIAGNOSIS — M62.89 PELVIC FLOOR DYSFUNCTION: Primary | ICD-10-CM

## 2024-11-19 PROCEDURE — 97530 THERAPEUTIC ACTIVITIES: CPT | Mod: PO,CQ

## 2024-11-19 PROCEDURE — 97112 NEUROMUSCULAR REEDUCATION: CPT | Mod: PO,CQ

## 2024-11-19 NOTE — PROGRESS NOTES
"    Pelvic Health Physical Therapy   Treatment Note     Name: Josie Maxwell [facility] Number: [id number]    Therapy Diagnosis:   Encounter Diagnosis   Name Primary?    Pelvic floor dysfunction Yes     Physician: DAVID Carrera,*    Visit Date: 11/19/2024    Physician Orders: PT Eval and Treat   Medical Diagnosis from Referral: Urinary frequency [R35.0], Urge incontinence of urine [N39.41], Constipation, unspecified constipation type [K59.00]   Evaluation Date: 11/6/2024  Authorization Period Expiration: 12-31-24  Plan of Care Expiration: 1/29/2025  Progress Note Due: 12/6/2024  Visit # / Visits authorized: 1/ 20  FOTO: 1/3     Precautions: Standard      Time In: 10:30 am  Time Out: 11:30 am  Total Appointment Time (timed & untimed codes): 55 minutes    Subjective     Pt reports: She has not been able to perform many of the home exercises because her life has been busy. "I just don't know how this is going to help me."   She was somewhat compliant with home exercise program.  Response to previous treatment: fair  Functional change: ongoing     Pain: 0/10  Location:       Objective     Josie received therapeutic exercises to develop  strength, endurance, ROM, flexibility, posture, and core stabilization for 0 minutes including:       Josie received the following manual therapy techniques: to develop flexibility, extensibility, and desensitization for 0 minutes including:       Josie participated in neuromuscular re-education activities to develop Coordination, Control, Down training, Posture, Proprioception, Kinesthetic, Balance, and Sense for 20 minutes including: pelvic floor mm contractions focus on activation at 3 layers sequentially in isolation and then sequentially    Layer by layer activation and relaxation   Diaphragmatic breathing     Josie participated in dynamic functional therapeutic activities to improve functional performance for 40  minutes, including:    Education:  The role of fiber and " water on colon health   The role of miralax and benefiber in constipation management  Improving toileting mechanics and relaxation techniques   Pressure management including sit to stand trials   Pressure management when lifting pushing pulling   Urge suppression techniques       Home Exercises Provided and Patient Education Provided     Education provided:   - anatomy/physiology of pelvic floor, bladder retraining, diaphragmatic breathing, kegels, behavior modifications, and Coordination of kegels with functional activities such as cough, laugh, sneeze, lift, etc.   Discussed progression of plan of care with patient; educated pt in activity modification; reviewed HEP with pt. Pt demonstrated and verbalized understanding of all instruction and was provided with a handout of HEP (see Patient Instructions).  - home exercise program update including layer by layer relaxation and techniques and breakdown of education applications    Written Home Exercises Provided: yes.  Exercises were reviewed and Josie was able to demonstrate them prior to the end of the session.  Josie demonstrated good  understanding of the education provided.     See EMR under Patient Instructions for exercises provided 11/19/2024.    Assessment     Today's treatment focused on education and empowerment for patient to create change in her life with improved control of Pelvic Floor. She was issued step by step instructions for engagement and relaxation of Pelvic Floor musculature as well as urge suppression and pressure management techniques. Patient verbalized understanding of all concepts. Carryover to be assessed at next visit. It should also be noted that patient reports taking both miralax and benefiber every day and having fecal incontinence as a direct result of loose stools. She was encouraged to slowly decrease the amount of miralax that she is taking and journal her response to the changes.     Josie Is progressing well towards her goals.    Pt prognosis is Good.     Pt will continue to benefit from skilled outpatient physical therapy to address the deficits listed in the problem list box on initial evaluation, provide pt/family education and to maximize pt's level of independence in the home and community environment.     Pt's spiritual, cultural and educational needs considered and pt agreeable to plan of care and goals.     Anticipated barriers to physical therapy: commute, scheduling     Goals: Goals:  Short Term Goals: 6 weeks   - Pt will demonstrate excellent knowledge and adherence to HEP to facilitate optimal recovery.  - Pt will demonstrate proper PFM contraction, relaxation, and lengthening coordinated with TA and breath for improved muscle coordination needed for functional activity.     Long Term Goals: 12 weeks   - Pt will demonstrate excellent knowledge and adherence to HEP for continued self-maintenance of symptoms.  - Pt will report FOTO Urinary Problem score of 54% or better indicating clinically relevant increase in function.  - Pt will report voiding interval of 2-3 hours for improved ADL tolerance.  - Pt will report ability to delay urinary urge for at least 15 minutes to maintain continence with ADL/IADLs.   - Pt will report improvements in incidence of urinary or fecal  incontinence by 60-70%for  improved hygiene and ADL/IADL tolerance.   - Pt will report needing </= 1 pad/day (including the night) indicating improved PFM function needed to maintain continence  - Pt will demonstrate PFM strength of at least 3/5 MMT for improved strength needed to maintain continence.   - Pt will report bearing down appropriately 100% of the time for improved bowel function and decreased stress on adjacent pelvic structures.   - Pt will demonstrate independence with pressure-management strategies to decreased stress on adjacent pelvic structures.     Plan     Plan of care Certification: 11/6/2024 to 1/29/2025.     Outpatient Physical Therapy 1-2  times weekly for 12 weeks    Next visit: pelvic assessment as soon as possible, both urinary control and improved defecation mechanics to be worked on. Strength and coordination training with review of [facility] [place], PTA

## 2024-11-19 NOTE — PROGRESS NOTES
Hospital Medicine     Daily Progress Note       SUBJECTIVE   Interval History:   Patient seen and examined. Sleepy.  NGT remains in place.  Initially denied pain but grimaced and c/o pain with palpation to abdomen.  Small formed stool in ostomy bag.     OBJECTIVE      Vital signs in last 24 hours:  Temp:  [36 °C (96.8 °F)-36.7 °C (98.1 °F)] 36.7 °C (98.1 °F)  Heart Rate:  [] 98  Resp:  [16-18] 17  BP: ()/(64-93) 157/90    Intake/Output Summary (Last 24 hours) at 10/15/2024 1355  Last data filed at 10/15/2024 0800  Gross per 24 hour   Intake 20 ml   Output 1550 ml   Net -1530 ml       PHYSICAL EXAMINATION      Physical Exam  Constitutional: sleepy. No distress.   HENT:   Head: Normocephalic and atraumatic.   Eyes: No scleral icterus.   Nose: NG tube   Cardiovascular: S1, S2. Normal rate and regular rhythm. No m/r/g appreciated.    Pulmonary/Chest: CTAB. No r/r/w. Respirations unlabored.  Abdominal: Soft. Tender - right> left. Ostomy stoma pink, small formed stool.  Musculoskeletal:  no edema.   Neurological: Grossly nonfocal  Skin: Skin is warm and dry.  No rash on exposed skin.  Psychiatric: Calm, cooperative.     LINES, CATHETERS, DRAINS, AIRWAYS, AND WOUNDS   Lines, Drains, and Airways:  Wounds (agree with documentation and present on admission):  Peripheral IV (Adult) 10/14/24 Anterior;Right Forearm (Active)   Number of days: 1       Peripheral IV (Adult) 10/14/24 Left Antecubital (Active)   Number of days: 1       Peripheral IV (Adult) 10/14/24 Anterior;Left;Proximal;Upper Arm (Active)   Number of days: 1       NG/OG Tube 10/14/24 Right nostril (Active)   Number of days: 1       Colostomy Unknown LUQ (Active)   Number of days: 5317         Comments:    LABS / IMAGING / TELE      Labs  Results from last 7 days   Lab Units 10/15/24  0639 10/14/24  0849 10/13/24  2208   SODIUM mEQ/L 138 136 135*   POTASSIUM mEQ/L 4.0 4.2 3.8   CHLORIDE mEQ/L 101 98 100   CO2 mEQ/L 31 31 25   BUN mg/dL 21 16 15    PT/PTA face to face conference completed regarding patient treatment plan and progress towards established goals. Treatment will be continued as described in initial report/ evaluation and treatment/progress notes. Patient will be seen by physical therapist every sixth visit or minimally once per month.       CREATININE mg/dL 0.9 0.8 0.7   GLUCOSE mg/dL 254* 162* 151*   CALCIUM mg/dL 9.6 10.1 9.7     Results from last 7 days   Lab Units 10/13/24  2208   MAGNESIUM mg/dL 1.7*     Results from last 7 days   Lab Units 10/15/24  0639 10/14/24  0849 10/13/24  2208   WBC K/uL 11.71* 13.35* 10.87*   HEMOGLOBIN g/dL 15.1 15.8 14.0   HEMATOCRIT % 45.7 47.6 41.7   PLATELETS K/uL 212 209 205           Imaging  X-RAY ABDOMEN 1 VIEW    Result Date: 10/14/2024  CLINICAL HISTORY: NGt placement COMPARISON: CT dated 10/14/2024 COMMENT: A single frontal view of the abdomen was obtained. There is a nasogastric tube terminating within the stomach. The visualized lung bases are stable. Incomplete visualization of the bowel gas pattern. No findings of pneumoperitoneum. The osseous structures are unremarkable.     IMPRESSION: Nasogastric tube in satisfactory position.     CT ANGIOGRAPHY CHEST PULMONARY EMBOLISM WITH IV CONTRAST    Addendum Date: 10/14/2024    Correction to IMPRESSION ADDENDUM: 3. Subtotal atelectasis of the RIGHT LOWER and MIDDLE lobes. The left lung is clear.     Addendum Date: 10/14/2024    Correction of a speech recognition/transcription error in the IMPRESSION section of the report. 2. Loculated RIGHT pleural effusion with pleural thickening. Empyema not excluded.    Result Date: 10/14/2024  CLINICAL HISTORY: Chest pain. Suspected pulmonary embolism. Left lower quadrant pain. COMPARISON: Chest x-ray dated 8/20/2024, CT dated 7/7/2024, and other studies TECHNIQUE: CT pulmonary angiography of the chest was performed after contrast administration. Maximum intensity projection (MIP) reconstructions were included for evaluation of pulmonary embolism. Three-dimensional maximum intensity projection imaging was also performed of the vasculature. Then, a contrast enhanced CT of the abdomen and pelvis was performed. Reformatted axial, sagittal and coronal images were reviewed. Dose reduction techniques such as automated exposure  control were used in this study where applicable. Administered contrast and dose: 100mL of iopamidoL (ISOVUE-370) 370 mg iodine /mL (76 %) injection 100 mL COMMENT: Thorax: The main pulmonary artery is normal in caliber. Multiple pulmonary arterial filling defects are seen involving lobar, segmental subsegmental branches in the left upper and left lower lobes. No right-sided pulmonary arterial filling defects are appreciated. The visualized thyroid gland is unremarkable. No thoracic lymphadenopathy. No aortic aneurysm. Mild cardiomegaly.  No pericardial effusion. Coronary calcifications noted. The central airways are grossly patent. There is a moderate-sized loculated right pleural effusion with associated pleural thickening, most pronounced at the lung bases. There is overall volume loss in the right lung with subtotal atelectasis in the right lower middle lobes. These findings are similar to the prior study. There is a stellate nodular opacity in the left upper lobe, series 4 image 88. This has been stable since at least 2020. There are dependent hypoventilatory changes in the left lung base. No left pleural effusion or pneumothorax. The bones of the chest are stable. Abdomen/Pelvis: The liver, gallbladder, pancreas, spleen and adrenal glands are unremarkable. There is a single nonobstructing stone in the upper pole of the left kidney. Multiple bilateral renal sinus cysts are noted, larger on the left. There are also several subcentimeter cortical hypodensities both kidneys, too small to characterize. The major abdominal vasculature is patent. There is a left lower quadrant end colostomy. There is a parastomal hernia containing loops of small bowel. The proximal to mid small bowel is mildly dilated with layering air-fluid levels. A transition point is seen in the left midabdomen, axial image 114 through 109. Findings are concerning for bowel obstruction. The change with a separate from the above-noted parastomal  hernia. No large volume ascites, intra-abdominal abscess or pneumoperitoneum. No lymphadenopathy by size criteria. Small fat-containing left inguinal hernia. The urinary bladder is unremarkable. The prostate is enlarged. Mild degenerative changes in the spine and hips.     IMPRESSION: 1.  Left upper and lower lobe pulmonary emboli. 2.  Loculated left pleural effusion with pleural thickening. Empyema not excluded. 3.  Subtotal atelectasis in the left upper and lower lobe. 4.  Dilated small bowel with transition point in the left midabdomen, concerning for bowel obstruction. 5.  Left lower quadrant colostomy with small bowel containing parastomal hernia. Critical findings were documented in the preliminary interpretation by the overnight radiologist. Findings were acknowledged by emergency department staff, as documented in the EMR on 10/14/2024 at 2:48 AM    CT ABDOMEN PELVIS WITH IV CONTRAST    Addendum Date: 10/14/2024    Correction to IMPRESSION ADDENDUM: 3. Subtotal atelectasis of the RIGHT LOWER and MIDDLE lobes. The left lung is clear.     Addendum Date: 10/14/2024    Correction of a speech recognition/transcription error in the IMPRESSION section of the report. 2. Loculated RIGHT pleural effusion with pleural thickening. Empyema not excluded.    Result Date: 10/14/2024  CLINICAL HISTORY: Chest pain. Suspected pulmonary embolism. Left lower quadrant pain. COMPARISON: Chest x-ray dated 8/20/2024, CT dated 7/7/2024, and other studies TECHNIQUE: CT pulmonary angiography of the chest was performed after contrast administration. Maximum intensity projection (MIP) reconstructions were included for evaluation of pulmonary embolism. Three-dimensional maximum intensity projection imaging was also performed of the vasculature. Then, a contrast enhanced CT of the abdomen and pelvis was performed. Reformatted axial, sagittal and coronal images were reviewed. Dose reduction techniques such as automated exposure control were  used in this study where applicable. Administered contrast and dose: 100mL of iopamidoL (ISOVUE-370) 370 mg iodine /mL (76 %) injection 100 mL COMMENT: Thorax: The main pulmonary artery is normal in caliber. Multiple pulmonary arterial filling defects are seen involving lobar, segmental subsegmental branches in the left upper and left lower lobes. No right-sided pulmonary arterial filling defects are appreciated. The visualized thyroid gland is unremarkable. No thoracic lymphadenopathy. No aortic aneurysm. Mild cardiomegaly.  No pericardial effusion. Coronary calcifications noted. The central airways are grossly patent. There is a moderate-sized loculated right pleural effusion with associated pleural thickening, most pronounced at the lung bases. There is overall volume loss in the right lung with subtotal atelectasis in the right lower middle lobes. These findings are similar to the prior study. There is a stellate nodular opacity in the left upper lobe, series 4 image 88. This has been stable since at least 2020. There are dependent hypoventilatory changes in the left lung base. No left pleural effusion or pneumothorax. The bones of the chest are stable. Abdomen/Pelvis: The liver, gallbladder, pancreas, spleen and adrenal glands are unremarkable. There is a single nonobstructing stone in the upper pole of the left kidney. Multiple bilateral renal sinus cysts are noted, larger on the left. There are also several subcentimeter cortical hypodensities both kidneys, too small to characterize. The major abdominal vasculature is patent. There is a left lower quadrant end colostomy. There is a parastomal hernia containing loops of small bowel. The proximal to mid small bowel is mildly dilated with layering air-fluid levels. A transition point is seen in the left midabdomen, axial image 114 through 109. Findings are concerning for bowel obstruction. The change with a separate from the above-noted parastomal hernia. No  large volume ascites, intra-abdominal abscess or pneumoperitoneum. No lymphadenopathy by size criteria. Small fat-containing left inguinal hernia. The urinary bladder is unremarkable. The prostate is enlarged. Mild degenerative changes in the spine and hips.     IMPRESSION: 1.  Left upper and lower lobe pulmonary emboli. 2.  Loculated left pleural effusion with pleural thickening. Empyema not excluded. 3.  Subtotal atelectasis in the left upper and lower lobe. 4.  Dilated small bowel with transition point in the left midabdomen, concerning for bowel obstruction. 5.  Left lower quadrant colostomy with small bowel containing parastomal hernia. Critical findings were documented in the preliminary interpretation by the overnight radiologist. Findings were acknowledged by emergency department staff, as documented in the EMR on 10/14/2024 at 2:48 AM    X-RAY ABDOMEN 1 VIEW    Result Date: 10/14/2024  CLINICAL HISTORY: tube placement COMPARISON: Abdominal x-ray performed 3 hours prior COMMENT: A single frontal view of the abdomen was obtained. The nasogastric tube has been advanced in the interval, terminating in the region of the stomach. The visualized lung bases and visualized bowel gas pattern are unchanged in the interval. The osseous structures are unremarkable.     IMPRESSION: Nasogastric tube in satisfactory position.       ECG/Telemetry  Sinus rhythm    ASSESSMENT AND PLAN      * PE (pulmonary thromboembolism) (CMS/HCC)  Overview  LLE DVT/PE in 2019-> Xarelto for 6 months then changed to prophylactic dose-> DVT 2020    Assessment & Plan  - on admission reports mild central chest pain x 2-3 days  - no SOB  - CTA chest - Left upper and lower lobe pulmonary emboli.  - Patient reports stopping Xarelto 2 days ago after listening to some one talk about Xarelto and Metoprolol interaction. On Med Dispense List he hasn't had any Xarelto fills since July.     P:  - continue heparin gtt  - eventual transition to Xarelto once  "we are sure there are no procedures  - needs better follow up    Pleural effusion  Assessment & Plan  - h/o recurrent pleural effusion requiring thoracentesis. AS/p \"Right VATS pleural biopsy, insertion of Pleurx catheter \" by Dr. Sergey Dennis on 7/8/24. Patient states catheter had been removed 2 months ago  - He was admitted to [facility] in August 2024 for fall. Looks like at the time had PET-CT which was negative for malignancy  - CT chest  -Loculated RIGHT pleural effusion with pleural thickening. Empyema not excluded.  - Pulse x stable on room air. Chronic dyspnea    P:  - Thoracic surgery consulted appreciated. Recommending IR consult for pigtail catheter placement    SBO (small bowel obstruction) (CMS/HCC)  Assessment & Plan  - Abd pain around area of colostomy since Sunday night  - CT A/P - Dilated small bowel with transition point in the left midabdomen, concerning for bowel obstruction. 5.  Left lower quadrant colostomy with small bowel containing parastomal hernia.   - normal lactate at 1.2, WBC 10.87K  - No n/v    P:  - Surgery following - rec SBFT today  - NGT remains in place  - NPO. Continue IV fluids  - pain control as needed    Grade I diastolic dysfunction  Assessment & Plan  ECHO 6/2024 : Left Ventricle: Normal ventricle size. Concentric left ventricular hypertrophy. Normal systolic function. Estimated EF 65%. No regional wall motion abnormalities. Grade I diastolic dysfunction   He has Lasix on his med list but has not been filled since 5/16/2024 for 90 day supply  Reportedly taking Metoprolol XL until recently  Looks euvolemic    P:  - HOLD diuretics for now  - monitor with fluids  - cotninue Lopressor IV 5mg every 8 hours while NPO    Severe malnutrition (CMS/HCC)  Assessment & Plan  Patient meets criteria for severe malnutrition of chronic illness based on eating less than 75% energy needs for greater than 1 month, >5% weight loss x 3 months (lost 15% of his body weight) and moderate muscle loss " with slight depression of temples.    Nutrition consult appreciated.    Medication management  Assessment & Plan  - Patient is not able to provide accurate list of medications  - He doesn't  have a PCP. Seems like he has been getting refills through cardiologist Dr. Campbell but they have noted he hasn't seen them in over a year.  - He states stopped Xarelto and Metoprolol couple days ago because he was told there is a drug-drug interaction.     P:  - SW and CM consult    History of rectal cancer  Overview  T2 N1 M0 rectal cancer, neoadjuvant chemotherapy and  radiotherapy followed by surgery (APR), then had multiple  complications after the surgery,   Colostomy    Assessment & Plan  - unknown who he follows with        VTE Assessment: [facility]    VTE Prophylaxis:  Current anticoagulants:  heparin (porcine) bolus from bag 2,700-5,450 Units, intravenous, q6h PRN  heparin infusion in D5W 100 units/mL, intravenous, Titrated      Code Status: Full Code      Estimated Discharge Date: 10/16/2024   Disposition Planning: PT/OT consulted. Cont inpatient level of care     VALENCIA Adkins  10/15/2024

## 2024-11-26 NOTE — PROGRESS NOTES
Pelvic Health Physical Therapy   Treatment Note     Name: Josie [facility] Number: [id number]    Therapy Diagnosis:   Encounter Diagnosis   Name Primary?    Pelvic floor dysfunction Yes       Physician: DAVID Carrera,*    Visit Date: 11/27/2024    Physician Orders: PT Eval and Treat   Medical Diagnosis from Referral: Urinary frequency [R35.0], Urge incontinence of urine [N39.41], Constipation, unspecified constipation type [K59.00]   Evaluation Date: 11/6/2024  Authorization Period Expiration: 12-31-24  Plan of Care Expiration: 1/29/2025  Progress Note Due: 12/6/2024  Visit # / Visits authorized: 2/ 20  FOTO: 1/3     Precautions: Standard      Time In: 10:00 am  Time Out: 10:45 am  Total Appointment Time (timed & untimed codes): 45 minutes    Subjective     Pt reports: things are not bad, she has not dedicated as much time as she should have but she is using her squatty potty which helps with the bowel movements. She is not great with delaying the urge to go.     She was somewhat compliant with home exercise program.  Response to previous treatment: fair  Functional change: ongoing     Pain: 0/10  Location:       Objective     VAGINAL PELVIC FLOOR EXAM    EXTERNAL ASSESSMENT  Introitus: WNL  Skin condition: WNL  Scarring: none   Sensation: WNL   Pain: none  Voluntary contraction: visible lift and accessory muscle use  Voluntary relaxation: nil  Involuntary contraction: visible drop  Bearing down: accessory muscle use  Perineal descent: absent      INTERNAL ASSESSMENT  Pain: none   Sensation: able to sense generalized pressure, unable to identify location   Vaginal vault: asymmetrical   Muscle Bulk: atrophy with areas of hypertonus  Muscle Power: 1/5  Muscle Endurance: 3 sec  # Reps To Fatigue: 4    Quality of contraction: slow rise, decreased hold, and slow relaxation   Specificity: patient contracts: adductors   Coordination: tends to hold breath during PFM contration and cannot co-contract  "PFM and abdominals   Comments: straining with bearing down and breath holding    Josie received therapeutic exercises to develop  strength, endurance, ROM, flexibility, posture, and core stabilization for 0 minutes including:       Josie received the following manual therapy techniques: to develop flexibility, extensibility, and desensitization for 0 minutes including:       Josie participated in neuromuscular re-education activities to develop Coordination, Control, Down training, Posture, Proprioception, Kinesthetic, Balance, and Sense for 45 minutes including: pelvic floor mm contractions focus on activation at 3 layers sequentially in isolation and then sequentially    Pelvic assessment as noted above  Layer by layer activation and relaxation   Diaphragmatic breathing   Sitting on towel roll performing coordination and proprioceptive training   Kegel squeeze, endurance holds (10 x 5"), and bearing down     Josie participated in dynamic functional therapeutic activities to improve functional performance for 0  minutes, including:      The role of fiber and water on colon health   The role of miralax and benefiber in constipation management  Improving toileting mechanics and relaxation techniques   Pressure management including sit to stand trials   Pressure management when lifting pushing pulling   Urge suppression techniques       Home Exercises Provided and Patient Education Provided     Education provided:   - anatomy/physiology of pelvic floor, bladder retraining, diaphragmatic breathing, kegels, behavior modifications, and Coordination of kegels with functional activities such as cough, laugh, sneeze, lift, etc.   Discussed progression of plan of care with patient; educated pt in activity modification; reviewed HEP with pt. Pt demonstrated and verbalized understanding of all instruction and was provided with a handout of HEP (see Patient Instructions).  - home exercise program update including layer by layer " relaxation and techniques and breakdown of education applications    Written Home Exercises Provided: yes.  Exercises were reviewed and Josie was able to demonstrate them prior to the end of the session.  Josie demonstrated good  understanding of the education provided.     See EMR under Patient Instructions for exercises provided 11/19/2024.    Assessment     Josie tolerated the pelvic assessment very well today without any pain. Noted coordination impairments with defecation are present as patient tends to strain and breath hold. Poor activation at right periurethral musculature, although this improved slightly with verbal and tactile cues.  Moderate improvement in pelvic activation by the end of today's visit despite poor proprioception. Biofeedback training to be very beneficial in follow up visit after she has performed increased proprioceptive training independently over the next week using towel roll under vulva.    Josie Is progressing well towards her goals.   Pt prognosis is Good.     Pt will continue to benefit from skilled outpatient physical therapy to address the deficits listed in the problem list box on initial evaluation, provide pt/family education and to maximize pt's level of independence in the home and community environment.     Pt's spiritual, cultural and educational needs considered and pt agreeable to plan of care and goals.     Anticipated barriers to physical therapy: commute, scheduling     Goals:  (PROGRESSING 11/27/2024 )  Short Term Goals: 6 weeks   - Pt will demonstrate excellent knowledge and adherence to HEP to facilitate optimal recovery.  - Pt will demonstrate proper PFM contraction, relaxation, and lengthening coordinated with TA and breath for improved muscle coordination needed for functional activity.     Long Term Goals: 12 weeks   - Pt will demonstrate excellent knowledge and adherence to HEP for continued self-maintenance of symptoms.  - Pt will report FOTO Urinary Problem  score of 54% or better indicating clinically relevant increase in function.  - Pt will report voiding interval of 2-3 hours for improved ADL tolerance.  - Pt will report ability to delay urinary urge for at least 15 minutes to maintain continence with ADL/IADLs.   - Pt will report improvements in incidence of urinary or fecal  incontinence by 60-70%for  improved hygiene and ADL/IADL tolerance.   - Pt will report needing </= 1 pad/day (including the night) indicating improved PFM function needed to maintain continence  - Pt will demonstrate PFM strength of at least 3/5 MMT for improved strength needed to maintain continence.   - Pt will report bearing down appropriately 100% of the time for improved bowel function and decreased stress on adjacent pelvic structures.   - Pt will demonstrate independence with pressure-management strategies to decreased stress on adjacent pelvic structures.     Plan     Plan of care Certification: 11/6/2024 to 1/29/2025.     Outpatient Physical Therapy 1-2 times weekly for 12 weeks    Next visit: pelvic assessment as soon as possible, both urinary control and improved defecation mechanics to be worked on. Strength and coordination training. Perform BFB on 12/4/2024.     Ayesha Aden, PT

## 2024-11-27 ENCOUNTER — CLINICAL SUPPORT (OUTPATIENT)
Dept: REHABILITATION | Facility: HOSPITAL | Age: 79
End: 2024-11-27
Payer: MEDICARE

## 2024-11-27 DIAGNOSIS — M62.89 PELVIC FLOOR DYSFUNCTION: Primary | ICD-10-CM

## 2024-11-27 PROCEDURE — 97112 NEUROMUSCULAR REEDUCATION: CPT | Mod: PO

## 2024-11-27 NOTE — PATIENT INSTRUCTIONS
"Sitting with towel roll under the vulva, practice the diaphragmatic breathing with bearing down (big belly, blow out like you are blowing out birthday candles) to practice pelvic drop needed for good bowel movement     2 x 10    After performing this relaxation technique, you can also use this same towel roll set up to practice strengthening the pelvic floor. While sitting on towel roll, perform Kegel squeeze 10 x 5" holds. Practice this 2-3 times per day if able- at minimum, practice at least once per day.       These are both easy to incorporate into your day such as during a virtual meeting, sitting and watching TV, on a car ride, etc.     "

## 2024-12-04 ENCOUNTER — CLINICAL SUPPORT (OUTPATIENT)
Dept: REHABILITATION | Facility: HOSPITAL | Age: 79
End: 2024-12-04
Payer: MEDICARE

## 2024-12-04 DIAGNOSIS — M62.89 PELVIC FLOOR DYSFUNCTION: Primary | ICD-10-CM

## 2024-12-04 PROCEDURE — 97112 NEUROMUSCULAR REEDUCATION: CPT | Mod: PO

## 2024-12-04 NOTE — PROGRESS NOTES
Pelvic Health Physical Therapy   Progress/Treatment Note     Name: Josie [facility] Number: [id number]    Therapy Diagnosis:   Encounter Diagnosis   Name Primary?    Pelvic floor dysfunction Yes       Physician: DAVID Carrera,*    Visit Date: 12/4/2024    Physician Orders: PT Eval and Treat   Medical Diagnosis from Referral: Urinary frequency [R35.0], Urge incontinence of urine [N39.41], Constipation, unspecified constipation type [K59.00]   Evaluation Date: 11/6/2024  Authorization Period Expiration: 12-31-24  Plan of Care Expiration: 1/29/2025  Progress Note Due: 1/4/2024  Visit # / Visits authorized: 3/ 20  FOTO: 2/3 Last provided: 12/4/2024     Precautions: Standard      Time In: 10:08 am  Time Out: 11:02 am  Total Appointment Time (timed & untimed codes): 54 minutes    Subjective     Pt reports: she did not do great with the towel thing. She feels like things are more dropping for her due to irritation from the elastic in her underwear.     She was somewhat compliant with home exercise program.  Response to previous treatment: fair  Functional change: ongoing     Pain: 0/10  Location:       Objective     VAGINAL PELVIC FLOOR EXAM- prior visit    EXTERNAL ASSESSMENT  Introitus: WNL  Skin condition: WNL  Scarring: none   Sensation: WNL   Pain: none  Voluntary contraction: visible lift and accessory muscle use  Voluntary relaxation: nil  Involuntary contraction: visible drop  Bearing down: accessory muscle use  Perineal descent: absent      INTERNAL ASSESSMENT  Pain: none   Sensation: able to sense generalized pressure, unable to identify location   Vaginal vault: asymmetrical   Muscle Bulk: atrophy with areas of hypertonus  Muscle Power: 1/5  Muscle Endurance: 3 sec  # Reps To Fatigue: 4    Quality of contraction: slow rise, decreased hold, and slow relaxation   Specificity: patient contracts: adductors   Coordination: tends to hold breath during PFM contration and cannot co-contract PFM and  abdominals   Comments: straining with bearing down and breath holding    Josie received therapeutic exercises to develop  strength, endurance, ROM, flexibility, posture, and core stabilization for 0 minutes including:       Josie received the following manual therapy techniques: to develop flexibility, extensibility, and desensitization for 0 minutes including:       Josie participated in neuromuscular re-education activities to develop Coordination, Control, Down training, Posture, Proprioception, Kinesthetic, Balance, and Sense for 54 minutes including: pelvic floor mm contractions focus on activation at 3 layers sequentially in isolation and then sequentially    Layer by layer activation and relaxation   Diaphragmatic breathing   Biofeedback working on proprioception (performed in supine secondary to hip pain today)   [id number]   Bearing down attempted (poor execution at this time- tends to strain and breath hold)    Josie participated in dynamic functional therapeutic activities to improve functional performance for 0  minutes, including:      The role of fiber and water on colon health   The role of miralax and benefiber in constipation management  Improving toileting mechanics and relaxation techniques   Pressure management including sit to stand trials   Pressure management when lifting pushing pulling   Urge suppression techniques       Home Exercises Provided and Patient Education Provided     Education provided:   - anatomy/physiology of pelvic floor, bladder retraining, diaphragmatic breathing, kegels, behavior modifications, and Coordination of kegels with functional activities such as cough, laugh, sneeze, lift, etc.   Discussed progression of plan of care with patient; educated pt in activity modification; reviewed HEP with pt. Pt demonstrated and verbalized understanding of all instruction and was provided with a handout of HEP (see Patient Instructions).  - home exercise program update including layer  by layer relaxation and techniques and breakdown of education applications    Written Home Exercises Provided: yes.  Exercises were reviewed and Josie was able to demonstrate them prior to the end of the session.  Josie demonstrated good  understanding of the education provided.     See EMR under Patient Instructions for exercises provided 11/19/2024.    Assessment     Josie performed biofeedback training today for visualization of pelvic floor muscle activation and relaxation. Confirmation of poor endurance hold as well as slow/incomplete relaxation. Gradual improvement in both relaxation and endurance hold in addition to awareness of muscle activation by the end of therapy today. With consistency in therapy, patient is expected to continue progressing well with better urinary control and complete emptying of urine/stool.       Prior visit:  Poor activation at right periurethral musculature, although this improved slightly with verbal and tactile cues.  Moderate improvement in pelvic activation by the end of today's visit despite poor proprioception. Biofeedback training to be very beneficial in follow up visit after she has performed increased proprioceptive training independently over the next week using towel roll under vulva.    Josie Is progressing well towards her goals.   Pt prognosis is Good.     Pt will continue to benefit from skilled outpatient physical therapy to address the deficits listed in the problem list box on initial evaluation, provide pt/family education and to maximize pt's level of independence in the home and community environment.     Pt's spiritual, cultural and educational needs considered and pt agreeable to plan of care and goals.     Anticipated barriers to physical therapy: commute, scheduling     Goals:  (PROGRESSING 12/4/2024 )  Short Term Goals: 6 weeks   - Pt will demonstrate excellent knowledge and adherence to HEP to facilitate optimal recovery. (NOT MET 12/4/2024)  - Pt will  demonstrate proper PFM contraction, relaxation, and lengthening coordinated with TA and breath for improved muscle coordination needed for functional activity. (NOT MET 12/4/2024)     Long Term Goals: 12 weeks   - Pt will demonstrate excellent knowledge and adherence to HEP for continued self-maintenance of symptoms.(NOT MET 12/4/2024)  - Pt will report FOTO Urinary Problem score of 54% or better indicating clinically relevant increase in function.(NOT MET 12/4/2024)  - Pt will report voiding interval of 2-3 hours for improved ADL tolerance.(PART MET 12/4/2024)  - Pt will report ability to delay urinary urge for at least 15 minutes to maintain continence with ADL/IADLs. (PART MET 12/4/2024)  - Pt will report improvements in incidence of urinary or fecal  incontinence by 60-70%for  improved hygiene and ADL/IADL tolerance. (NOT MET 12/4/2024)  - Pt will report needing </= 1 pad/day (including the night) indicating improved PFM function needed to maintain continence(NOT MET 12/4/2024)  - Pt will demonstrate PFM strength of at least 3/5 MMT for improved strength needed to maintain continence. (NOT MET 12/4/2024)  - Pt will report bearing down appropriately 100% of the time for improved bowel function and decreased stress on adjacent pelvic structures. (NOT MET 12/4/2024)  - Pt will demonstrate independence with pressure-management strategies to decreased stress on adjacent pelvic structures. (NOT MET 12/4/2024)    Plan     Plan of care Certification: 11/6/2024 to 1/29/2025.     Outpatient Physical Therapy 1-2 times weekly for 12 weeks    Next visit: Both urinary control and improved defecation mechanics to be worked on. Strength and coordination training. Perform BFB again on 12/11/2024.     Ayesha Aden, PT

## 2024-12-11 ENCOUNTER — CLINICAL SUPPORT (OUTPATIENT)
Dept: REHABILITATION | Facility: HOSPITAL | Age: 79
End: 2024-12-11
Payer: MEDICARE

## 2024-12-11 DIAGNOSIS — M62.89 PELVIC FLOOR DYSFUNCTION: Primary | ICD-10-CM

## 2024-12-11 PROCEDURE — 97112 NEUROMUSCULAR REEDUCATION: CPT | Mod: PO

## 2024-12-11 PROCEDURE — 97530 THERAPEUTIC ACTIVITIES: CPT | Mod: PO

## 2024-12-11 NOTE — PROGRESS NOTES
Pelvic Health Physical Therapy   Treatment Note     Name: Josie Maxwell [facility] Number: [id number]    Therapy Diagnosis:   Encounter Diagnosis   Name Primary?    Pelvic floor dysfunction Yes       Physician: DAVID Carrera,*    Visit Date: 12/11/2024    Physician Orders: PT Eval and Treat   Medical Diagnosis from Referral: Urinary frequency [R35.0], Urge incontinence of urine [N39.41], Constipation, unspecified constipation type [K59.00]   Evaluation Date: 11/6/2024  Authorization Period Expiration: 12-31-24  Plan of Care Expiration: 1/29/2025  Progress Note Due: 1/4/2024  Visit # / Visits authorized: 4/ 20  FOTO: 2/3 Last provided: 12/4/2024     Precautions: Standard      Time In: 10:00 am  Time Out: 10:47 am  Total Appointment Time (timed & untimed codes): 47 minutes    Subjective     Pt reports: the irritation is getting better. She is doing good with the bowel movements on the squatty potty with less straining. Is not not doing bad in the day with urination, it is just at night time. No leakage through the night until she goes to get up.     She was somewhat compliant with home exercise program.  Response to previous treatment: fair  Functional change: ongoing     Pain: 0/10  Location:       Objective     VAGINAL PELVIC FLOOR EXAM- prior visit; deferred today    EXTERNAL ASSESSMENT  Introitus: WNL  Skin condition: WNL  Scarring: none   Sensation: WNL   Pain: none  Voluntary contraction: visible lift and accessory muscle use  Voluntary relaxation: nil  Involuntary contraction: visible drop  Bearing down: accessory muscle use  Perineal descent: absent      INTERNAL ASSESSMENT  Pain: none   Sensation: able to sense generalized pressure, unable to identify location   Vaginal vault: asymmetrical   Muscle Bulk: atrophy with areas of hypertonus  Muscle Power: 1/5  Muscle Endurance: 3 sec  # Reps To Fatigue: 4    Quality of contraction: slow rise, decreased hold, and slow relaxation   Specificity:  patient contracts: adductors   Coordination: tends to hold breath during PFM contration and cannot co-contract PFM and abdominals   Comments: straining with bearing down and breath holding    Josie received therapeutic exercises to develop  strength, endurance, ROM, flexibility, posture, and core stabilization for 8 minutes including:     (Rest breaks as needed due to dizziness with head movements)  [place] ball roll outs 3 way x 10 each way   Exhale each time she transitions    Josie received the following manual therapy techniques: to develop flexibility, extensibility, and desensitization for 0 minutes including:       Josie participated in neuromuscular re-education activities to develop Coordination, Control, Down training, Posture, Proprioception, Kinesthetic, Balance, and Sense for 27 minutes including: pelvic floor mm contractions focus on activation at 3 layers sequentially in isolation and then sequentially    Layer by layer activation and relaxation   Diaphragmatic breathing   Supine pelvic tilting (easing back spasms)  Supine Kegel + ball squeeze with exhale x 10  Side lying clamshells, orange band- exhale with lift 2 x 10    Not today:  Biofeedback working on proprioception (performed in supine secondary to hip pain today)   [id number]   Bearing down attempted (poor execution at this time- tends to strain and breath hold)    Josie participated in dynamic functional therapeutic activities to improve functional performance for 12  minutes, including:    Log rolling left and right with pelvic activation and exhale   Sit to stands with pelvic activation and exhale 2 x 5 (halted secondary to knee pain)      Not today:  The role of fiber and water on colon health   The role of miralax and benefiber in constipation management  Improving toileting mechanics and relaxation techniques   Pressure management including sit to stand trials   Pressure management when lifting pushing pulling   Urge suppression techniques        Home Exercises Provided and Patient Education Provided     Education provided:   - anatomy/physiology of pelvic floor, bladder retraining, diaphragmatic breathing, kegels, behavior modifications, and Coordination of kegels with functional activities such as cough, laugh, sneeze, lift, etc.   Discussed progression of plan of care with patient; educated pt in activity modification; reviewed HEP with pt. Pt demonstrated and verbalized understanding of all instruction and was provided with a handout of HEP (see Patient Instructions).  - home exercise program update including layer by layer relaxation and techniques and breakdown of education applications    Written Home Exercises Provided: yes.  Exercises were reviewed and Josie was able to demonstrate them prior to the end of the session.  Josie demonstrated good  understanding of the education provided.     See EMR under Patient Instructions for exercises provided 11/19/2024.    Assessment     Josie tolerated gradual progression in pelvic activation tasks very well today despite quickness to fatigue and poor mobility leading to increased discomfort with movement. Frequent cues required to assist in decreased breath holding during transitions especially.Bilateral knee pain was a limitation to several progressions today. Despite this, patient with improving pelvic floor awareness and reports feeling good by the end of today's visit with decreased global pain.    Prior visit:  Josie performed biofeedback training today for visualization of pelvic floor muscle activation and relaxation. Confirmation of poor endurance hold as well as slow/incomplete relaxation. Gradual improvement in both relaxation and endurance hold in addition to awareness of muscle activation by the end of therapy today. With consistency in therapy, patient is expected to continue progressing well with better urinary control and complete emptying of urine/stool.     Josie Is progressing well  towards her goals.   Pt prognosis is Good.     Pt will continue to benefit from skilled outpatient physical therapy to address the deficits listed in the problem list box on initial evaluation, provide pt/family education and to maximize pt's level of independence in the home and community environment.     Pt's spiritual, cultural and educational needs considered and pt agreeable to plan of care and goals.     Anticipated barriers to physical therapy: commute, scheduling     Goals:  (PROGRESSING 12/11/2024 )  Short Term Goals: 6 weeks   - Pt will demonstrate excellent knowledge and adherence to HEP to facilitate optimal recovery. (NOT MET 12/4/2024)  - Pt will demonstrate proper PFM contraction, relaxation, and lengthening coordinated with TA and breath for improved muscle coordination needed for functional activity. (NOT MET 12/4/2024)     Long Term Goals: 12 weeks   - Pt will demonstrate excellent knowledge and adherence to HEP for continued self-maintenance of symptoms.(NOT MET 12/4/2024)  - Pt will report FOTO Urinary Problem score of 54% or better indicating clinically relevant increase in function.(NOT MET 12/4/2024)  - Pt will report voiding interval of 2-3 hours for improved ADL tolerance.(PART MET 12/4/2024)  - Pt will report ability to delay urinary urge for at least 15 minutes to maintain continence with ADL/IADLs. (PART MET 12/4/2024)  - Pt will report improvements in incidence of urinary or fecal  incontinence by 60-70%for  improved hygiene and ADL/IADL tolerance. (NOT MET 12/4/2024)  - Pt will report needing </= 1 pad/day (including the night) indicating improved PFM function needed to maintain continence(NOT MET 12/4/2024)  - Pt will demonstrate PFM strength of at least 3/5 MMT for improved strength needed to maintain continence. (NOT MET 12/4/2024)  - Pt will report bearing down appropriately 100% of the time for improved bowel function and decreased stress on adjacent pelvic structures. (NOT MET  12/4/2024)  - Pt will demonstrate independence with pressure-management strategies to decreased stress on adjacent pelvic structures. (NOT MET 12/4/2024)    Plan     Plan of care Certification: 11/6/2024 to 1/29/2025.     Outpatient Physical Therapy 1-2 times weekly for 12 weeks    Next visit: progress strength training with pressure management; ensuring complete relaxation. Print HEP with clams, sit to stands, pelvic tilts, and rollouts (epic and printers  not working on 12/11)    Ayesha Aden, PT

## 2024-12-18 ENCOUNTER — CLINICAL SUPPORT (OUTPATIENT)
Dept: REHABILITATION | Facility: HOSPITAL | Age: 79
End: 2024-12-18
Payer: MEDICARE

## 2024-12-18 DIAGNOSIS — M62.89 PELVIC FLOOR DYSFUNCTION: Primary | ICD-10-CM

## 2024-12-18 PROCEDURE — 97112 NEUROMUSCULAR REEDUCATION: CPT | Mod: KX,PO,CQ

## 2024-12-18 NOTE — PROGRESS NOTES
Pelvic Health Physical Therapy   Treatment Note     Name: Josie Maxwell [facility] Number: [id number]    Therapy Diagnosis:   Encounter Diagnosis   Name Primary?    Pelvic floor dysfunction Yes       Physician: DAVID Carrera,*    Visit Date: 12/18/2024    Physician Orders: PT Eval and Treat   Medical Diagnosis from Referral: Urinary frequency [R35.0], Urge incontinence of urine [N39.41], Constipation, unspecified constipation type [K59.00]   Evaluation Date: 11/6/2024  Authorization Period Expiration: 12-31-24  Plan of Care Expiration: 1/29/2025  Progress Note Due: 1/4/2024  Visit # / Visits authorized: 5/ 20  FOTO: 2/3 Last provided: 12/4/2024     Precautions: Standard      Time In: 10:00 am  Time Out: 10:45 am  Total Appointment Time (timed & untimed codes): 45 minutes    Subjective     Pt reports: overall improvement.     She was somewhat compliant with home exercise program.  Response to previous treatment: fair  Functional change: ongoing     Pain: 0/10  Location:       Objective     VAGINAL PELVIC FLOOR EXAM- prior visit; deferred today    EXTERNAL ASSESSMENT  Introitus: WNL  Skin condition: WNL  Scarring: none   Sensation: WNL   Pain: none  Voluntary contraction: visible lift and accessory muscle use  Voluntary relaxation: nil  Involuntary contraction: visible drop  Bearing down: accessory muscle use  Perineal descent: absent      INTERNAL ASSESSMENT  Pain: none   Sensation: able to sense generalized pressure, unable to identify location   Vaginal vault: asymmetrical   Muscle Bulk: atrophy with areas of hypertonus  Muscle Power: 1/5  Muscle Endurance: 3 sec  # Reps To Fatigue: 4    Quality of contraction: slow rise, decreased hold, and slow relaxation   Specificity: patient contracts: adductors   Coordination: tends to hold breath during PFM contration and cannot co-contract PFM and abdominals   Comments: straining with bearing down and breath holding    Josie received therapeutic exercises to  develop  strength, endurance, ROM, flexibility, posture, and core stabilization for 8 minutes including:     (Rest breaks as needed due to dizziness with head movements)  Turkmen ball roll outs 3 way x 10 each way   Exhale each time she transitions    Josie received the following manual therapy techniques: to develop flexibility, extensibility, and desensitization for 0 minutes including:       Josie participated in neuromuscular re-education activities to develop Coordination, Control, Down training, Posture, Proprioception, Kinesthetic, Balance, and Sense for 40 minutes including: pelvic floor mm contractions focus on activation at 3 layers sequentially in isolation and then sequentially    Layer by layer activation and relaxation   Diaphragmatic breathing   Supine pelvic tilting (easing back spasms)  Supine Kegel + ball squeeze with exhale x 10  Side lying clamshells, orange band- exhale with lift 2 x 10  Mini wall sits 20 sec x 3   Bridges with kegel glute set and exhale 2\ x 10     Not today:  Biofeedback working on proprioception (performed in supine secondary to hip pain today)   [id number]   Bearing down attempted (poor execution at this time- tends to strain and breath hold)    Josie participated in dynamic functional therapeutic activities to improve functional performance for 0  minutes, including:    Log rolling left and right with pelvic activation and exhale   Sit to stands with pelvic activation and exhale 2 x 5 (halted secondary to knee pain)      Not today:  The role of fiber and water on colon health   The role of miralax and benefiber in constipation management  Improving toileting mechanics and relaxation techniques   Pressure management including sit to stand trials   Pressure management when lifting pushing pulling   Urge suppression techniques       Home Exercises Provided and Patient Education Provided     Education provided:   - anatomy/physiology of pelvic floor, bladder retraining,  diaphragmatic breathing, kegels, behavior modifications, and Coordination of kegels with functional activities such as cough, laugh, sneeze, lift, etc.   Discussed progression of plan of care with patient; educated pt in activity modification; reviewed HEP with pt. Pt demonstrated and verbalized understanding of all instruction and was provided with a handout of HEP (see Patient Instructions).  - home exercise program update including layer by layer relaxation and techniques and breakdown of education applications    Written Home Exercises Provided: yes.  Exercises were reviewed and Josie was able to demonstrate them prior to the end of the session.  Josie demonstrated good  understanding of the education provided.     See EMR under Patient Instructions for exercises provided 11/19/2024.    Assessment     Josie tolerated treatment well today.  Today's treatment focused on review of therapeutic exercises that she is performing at home. She required min cues for correction but overall is performing exercises very well. .    Prior visit:  Josie performed biofeedback training today for visualization of pelvic floor muscle activation and relaxation. Confirmation of poor endurance hold as well as slow/incomplete relaxation. Gradual improvement in both relaxation and endurance hold in addition to awareness of muscle activation by the end of therapy today. With consistency in therapy, patient is expected to continue progressing well with better urinary control and complete emptying of urine/stool.     Josie Is progressing well towards her goals.   Pt prognosis is Good.     Pt will continue to benefit from skilled outpatient physical therapy to address the deficits listed in the problem list box on initial evaluation, provide pt/family education and to maximize pt's level of independence in the home and community environment.     Pt's spiritual, cultural and educational needs considered and pt agreeable to plan of care and  goals.     Anticipated barriers to physical therapy: commute, scheduling     Goals:  (PROGRESSING 12/18/2024 )  Short Term Goals: 6 weeks   - Pt will demonstrate excellent knowledge and adherence to HEP to facilitate optimal recovery. (NOT MET 12/4/2024)  - Pt will demonstrate proper PFM contraction, relaxation, and lengthening coordinated with TA and breath for improved muscle coordination needed for functional activity. (NOT MET 12/4/2024)     Long Term Goals: 12 weeks   - Pt will demonstrate excellent knowledge and adherence to HEP for continued self-maintenance of symptoms.(NOT MET 12/4/2024)  - Pt will report FOTO Urinary Problem score of 54% or better indicating clinically relevant increase in function.(NOT MET 12/4/2024)  - Pt will report voiding interval of 2-3 hours for improved ADL tolerance.(PART MET 12/4/2024)  - Pt will report ability to delay urinary urge for at least 15 minutes to maintain continence with ADL/IADLs. (PART MET 12/4/2024)  - Pt will report improvements in incidence of urinary or fecal  incontinence by 60-70%for  improved hygiene and ADL/IADL tolerance. (NOT MET 12/4/2024)  - Pt will report needing </= 1 pad/day (including the night) indicating improved PFM function needed to maintain continence(NOT MET 12/4/2024)  - Pt will demonstrate PFM strength of at least 3/5 MMT for improved strength needed to maintain continence. (NOT MET 12/4/2024)  - Pt will report bearing down appropriately 100% of the time for improved bowel function and decreased stress on adjacent pelvic structures. (NOT MET 12/4/2024)  - Pt will demonstrate independence with pressure-management strategies to decreased stress on adjacent pelvic structures. (NOT MET 12/4/2024)    Plan     Plan of care Certification: 11/6/2024 to 1/29/2025.     Outpatient Physical Therapy 1-2 times weekly for 12 weeks    Next visit: progress strength training with pressure management; ensuring complete relaxation. Print HEP with clams, sit to  stands, pelvic tilts, and rollouts (epic and printers  not working on 12/11)    Ave Gramajo, PTA

## 2024-12-19 ENCOUNTER — OFFICE VISIT (OUTPATIENT)
Dept: UROGYNECOLOGY | Facility: CLINIC | Age: 79
End: 2024-12-19
Payer: MEDICARE

## 2024-12-19 ENCOUNTER — TELEPHONE (OUTPATIENT)
Dept: UROGYNECOLOGY | Facility: CLINIC | Age: 79
End: 2024-12-19
Payer: MEDICARE

## 2024-12-19 DIAGNOSIS — N39.41 URGE INCONTINENCE OF URINE: ICD-10-CM

## 2024-12-19 DIAGNOSIS — N81.11 CYSTOCELE, MIDLINE: ICD-10-CM

## 2024-12-19 DIAGNOSIS — N81.9 VAGINAL VAULT PROLAPSE: ICD-10-CM

## 2024-12-19 DIAGNOSIS — R35.0 URINARY FREQUENCY: Primary | ICD-10-CM

## 2024-12-19 DIAGNOSIS — Z46.89 PESSARY MAINTENANCE: ICD-10-CM

## 2024-12-19 DIAGNOSIS — N95.2 ATROPHIC VAGINITIS: ICD-10-CM

## 2024-12-19 LAB
BILIRUBIN, UA POC OHS: NEGATIVE
BLOOD, UA POC OHS: NEGATIVE
CLARITY, UA POC OHS: CLEAR
COLOR, UA POC OHS: YELLOW
GLUCOSE, UA POC OHS: NEGATIVE
KETONES, UA POC OHS: NEGATIVE
LEUKOCYTES, UA POC OHS: ABNORMAL
NITRITE, UA POC OHS: NEGATIVE
PH, UA POC OHS: 5.5
PROTEIN, UA POC OHS: NEGATIVE
SPECIFIC GRAVITY, UA POC OHS: 1.01
UROBILINOGEN, UA POC OHS: 0.2

## 2024-12-19 PROCEDURE — 87086 URINE CULTURE/COLONY COUNT: CPT | Performed by: NURSE PRACTITIONER

## 2024-12-19 PROCEDURE — 81003 URINALYSIS AUTO W/O SCOPE: CPT | Mod: PBBFAC,PO | Performed by: NURSE PRACTITIONER

## 2024-12-19 PROCEDURE — 99999PBSHW POCT URINALYSIS(INSTRUMENT): Mod: PBBFAC,,,

## 2024-12-19 PROCEDURE — 99999 PR PBB SHADOW E&M-EST. PATIENT-LVL II: CPT | Mod: PBBFAC,,, | Performed by: NURSE PRACTITIONER

## 2024-12-19 PROCEDURE — 99212 OFFICE O/P EST SF 10 MIN: CPT | Mod: PBBFAC,PO | Performed by: NURSE PRACTITIONER

## 2024-12-19 NOTE — PROGRESS NOTES
"Subjective:       Patient ID: Josie Allen is a 79 y.o. female.    Chief Complaint: Pessary Check    HPI  Josie Allen is a 79 y.o. female.  Who presents today for pessary concerns.  She was last seen in our office on 10/09/2024.  She had no real complaints at that time.  She recently started doing pelvic floor physical therapy.  She feels that it has been beneficial for her but in the past few days she feels that there is a bump on the left side and she is having some lower abdominal cramping throughout.  She was partially thinking that it was related to the MiraLax that she has been taking but was concerned that maybe something was going on with the pessary.  She is wondering if anything is out of position since she has been doing the exercises with her physical therapy.  She denies any other acute complaints/concerns at this time is ready to proceed with the pessary.    Review of Systems   Constitutional:  Negative for activity change, fever and unexpected weight change.   HENT:  Negative for hearing loss.    Eyes:  Negative for visual disturbance.   Respiratory:  Negative for shortness of breath and wheezing.    Cardiovascular:  Negative for chest pain, palpitations and leg swelling.   Gastrointestinal:  Positive for abdominal pain. Negative for constipation and diarrhea.   Genitourinary:  Positive for frequency and urgency. Negative for dyspareunia, dysuria, vaginal bleeding and vaginal discharge.        "bump" on the left side inside the vagina   Musculoskeletal:  Negative for gait problem and neck pain.   Skin:  Negative for rash and wound.   Allergic/Immunologic: Negative for immunocompromised state.   Neurological:  Negative for tremors, speech difficulty and weakness.   Hematological:  Does not bruise/bleed easily.   Psychiatric/Behavioral:  Negative for agitation and confusion.        Objective:      Physical Exam  Vitals reviewed. Exam conducted with a chaperone present. "   Constitutional:       General: She is not in acute distress.     Appearance: She is well-developed.   HENT:      Head: Normocephalic and atraumatic.   Neck:      Thyroid: No thyromegaly.   Pulmonary:      Effort: Pulmonary effort is normal. No respiratory distress.   Abdominal:      Palpations: Abdomen is soft.      Tenderness: There is no abdominal tenderness.      Hernia: No hernia is present.   Musculoskeletal:         General: Normal range of motion.      Cervical back: Normal range of motion.   Skin:     General: Skin is warm and dry.      Findings: No rash.   Neurological:      Mental Status: She is alert and oriented to person, place, and time.   Psychiatric:         Mood and Affect: Mood normal.         Behavior: Behavior normal.         Thought Content: Thought content normal.       Pelvic Exam:  V: No lesions. No palpable nodes.   Va:  Small amount of thin yellow discharge no bleeding noted.  Dominant midline cystocele BA equals 0.  Vaginal vault prolapse to negative 3 position  Meatus:No caruncle or stenosis  Urethra: Non tender. No suburethral masses.  Cx/Cuff: Normal   Uterus:  Surgically absent  Ad: No mass or tenderness.  Levators :Symmetrical. Normal tone. Non tender.  BL: Non tender  RV: No hemorrhoids.      Assessment:       1. Urinary frequency    2. Urge incontinence of urine    3. Cystocele, midline    4. Vaginal vault prolapse    5. Pessary maintenance    6. Atrophic vaginitis          Procedure note- #3 ring pessary removed and cleaned with soap and water.  After betadine irrigation of the vagina, #3 ring pessary was reinserted into the vagina.  Pt ambulated in the room and denies any discomfort.  NP reminded pt that the first 24-48 hours are the most likely time for the pessary to fall out and to monitor the toilet before flushing.  Pt verbalized understanding.      Plan:       Urinary frequency her urine is mostly negative but does have some leukocytes.  We will send it for culture as  noted below  -     Urine culture  -     POCT Urinalysis(Instrument)    Urge incontinence of urine monitor    Cystocele, midline continue with ring pessary.    Vaginal vault prolapse continue with ring pessary    Pessary maintenance as noted above    Atrophic vaginitis continue Estrace cream    RTC 3 months or p.r.n.

## 2024-12-19 NOTE — TELEPHONE ENCOUNTER
----- Message from Tamar sent at 12/19/2024 11:12 AM CST -----  Type:  Needs Medical Advice    Who Called: pt       Symptoms (please be specific): pain in pelvic        How long has patient had these symptoms:  2 days       Pharmacy name and phone #:    [id number] - [place], [place] - [address] AT [facility] [address]  [address]  [place] [address]  Phone: [phone] Fax: [phone]         Would the patient rather a call back or a response via [facility]? Call back       Best Call Back Number: [phone]       Additional Information: pt would like to speak with someone regarding pain  in pelvic area.     Please call back to advise. Thank you.

## 2024-12-19 NOTE — TELEPHONE ENCOUNTER
Pelvic therapy, feels lump on left side of vagina when she wipes , sounds like pessery is slipping will come in to be checked.

## 2024-12-21 LAB — BACTERIA UR CULT: NORMAL

## 2025-03-19 ENCOUNTER — OFFICE VISIT (OUTPATIENT)
Dept: UROGYNECOLOGY | Facility: CLINIC | Age: 80
End: 2025-03-19
Payer: MEDICARE

## 2025-03-19 DIAGNOSIS — N81.9 VAGINAL VAULT PROLAPSE: ICD-10-CM

## 2025-03-19 DIAGNOSIS — Z46.89 PESSARY MAINTENANCE: ICD-10-CM

## 2025-03-19 DIAGNOSIS — N39.41 URGE INCONTINENCE OF URINE: ICD-10-CM

## 2025-03-19 DIAGNOSIS — N81.11 CYSTOCELE, MIDLINE: ICD-10-CM

## 2025-03-19 DIAGNOSIS — N95.2 ATROPHIC VAGINITIS: ICD-10-CM

## 2025-03-19 DIAGNOSIS — R35.0 URINARY FREQUENCY: Primary | ICD-10-CM

## 2025-03-19 LAB
BILIRUBIN, UA POC OHS: ABNORMAL
BLOOD, UA POC OHS: NEGATIVE
CLARITY, UA POC OHS: CLEAR
COLOR, UA POC OHS: YELLOW
GLUCOSE, UA POC OHS: NEGATIVE
KETONES, UA POC OHS: NEGATIVE
LEUKOCYTES, UA POC OHS: ABNORMAL
NITRITE, UA POC OHS: NEGATIVE
PH, UA POC OHS: 7
PROTEIN, UA POC OHS: NEGATIVE
SPECIFIC GRAVITY, UA POC OHS: 1.01
UROBILINOGEN, UA POC OHS: 0.2

## 2025-03-19 PROCEDURE — 81003 URINALYSIS AUTO W/O SCOPE: CPT | Mod: PBBFAC,PO | Performed by: NURSE PRACTITIONER

## 2025-03-19 PROCEDURE — 99213 OFFICE O/P EST LOW 20 MIN: CPT | Mod: S$PBB,,, | Performed by: NURSE PRACTITIONER

## 2025-03-19 PROCEDURE — 99212 OFFICE O/P EST SF 10 MIN: CPT | Mod: PBBFAC,PO | Performed by: NURSE PRACTITIONER

## 2025-03-19 PROCEDURE — 99999 PR PBB SHADOW E&M-EST. PATIENT-LVL II: CPT | Mod: PBBFAC,,, | Performed by: NURSE PRACTITIONER

## 2025-03-19 PROCEDURE — 99999PBSHW POCT URINALYSIS(INSTRUMENT): Mod: PBBFAC,,,

## 2025-03-19 NOTE — PROGRESS NOTES
Subjective:       Patient ID: Josie Allen is a 79 y.o. female.    Chief Complaint: Pessary Check    HPI  Josie Allen is a 79 y.o. female.  Who presents today for routine pessary maintenance.  She was last seen in our office on 12/19/24.  She feels that the pessary is doing fairly well for her.  She has been having cramping off and on in her abdomen but relates this to her bowels as she has issues with IBS.  She has been using her Estrace cream about twice a week at this point.  She denies any other acute complaints/concerns at this time and is ready to proceed with the pessary.    Review of Systems   Constitutional:  Negative for activity change, fever and unexpected weight change.   HENT:  Negative for hearing loss.    Eyes:  Negative for visual disturbance.   Respiratory:  Negative for shortness of breath and wheezing.    Cardiovascular:  Negative for chest pain, palpitations and leg swelling.   Gastrointestinal:  Negative for abdominal pain, constipation and diarrhea.   Genitourinary:  Positive for frequency. Negative for dyspareunia, dysuria, urgency, vaginal bleeding and vaginal discharge.   Musculoskeletal:  Negative for gait problem and neck pain.   Skin:  Negative for rash and wound.   Allergic/Immunologic: Negative for immunocompromised state.   Neurological:  Negative for tremors, speech difficulty and weakness.   Hematological:  Does not bruise/bleed easily.   Psychiatric/Behavioral:  Negative for agitation and confusion.        Objective:      Physical Exam  Vitals reviewed. Exam conducted with a chaperone present.   Constitutional:       General: She is not in acute distress.     Appearance: She is well-developed.   HENT:      Head: Normocephalic and atraumatic.   Neck:      Thyroid: No thyromegaly.   Pulmonary:      Effort: Pulmonary effort is normal. No respiratory distress.   Abdominal:      Palpations: Abdomen is soft.      Tenderness: There is no abdominal tenderness.      Hernia:  No hernia is present.   Musculoskeletal:         General: Normal range of motion.      Cervical back: Normal range of motion.   Skin:     General: Skin is warm and dry.      Findings: No rash.   Neurological:      Mental Status: She is alert and oriented to person, place, and time.   Psychiatric:         Mood and Affect: Mood normal.         Behavior: Behavior normal.         Thought Content: Thought content normal.       Pelvic Exam:  V: No lesions. No palpable nodes.   Va:  Small amount of white discharge.  No bleeding noted.  Dominant midline cystocele BA equals 0.  Vaginal vault prolapse to negative 3 position  Meatus:No caruncle or stenosis  Urethra: Non tender. No suburethral masses.  Cx/Cuff: Normal   Uterus:  Surgically absent  Ad: No mass or tenderness.  Levators :Symmetrical. Normal tone. Non tender.  BL: Non tender  RV: No hemorrhoids.      Assessment:       1. Urinary frequency    2. Urge incontinence of urine    3. Cystocele, midline    4. Vaginal vault prolapse    5. Pessary maintenance    6. Atrophic vaginitis          Procedure note- #3 ring  pessary removed and cleaned with soap and water.  After betadine irrigation of the vagina, #3 ring pessary was reinserted into the vagina.  Pt ambulated in the room and denies any discomfort.  NP reminded pt that the first 24-48 hours are the most likely time for the pessary to fall out and to monitor the toilet before flushing.  Pt verbalized understanding.      Plan:       Urinary frequency monitor  -     POCT Urinalysis(Instrument)    Urge incontinence of urine monitor    Cystocele, midline continue with ring pessary    Vaginal vault prolapse continue with ring pessary    Pessary maintenance as noted above    Atrophic vaginitis continue with Estrace cream    RTC 3 months

## 2025-03-25 ENCOUNTER — OFFICE VISIT (OUTPATIENT)
Dept: URGENT CARE | Facility: CLINIC | Age: 80
End: 2025-03-25
Payer: MEDICARE

## 2025-03-25 VITALS
WEIGHT: 195 LBS | TEMPERATURE: 98 F | DIASTOLIC BLOOD PRESSURE: 106 MMHG | HEART RATE: 81 BPM | RESPIRATION RATE: 20 BRPM | BODY MASS INDEX: 31.34 KG/M2 | HEIGHT: 66 IN | OXYGEN SATURATION: 98 % | SYSTOLIC BLOOD PRESSURE: 173 MMHG

## 2025-03-25 DIAGNOSIS — R42 VERTIGO: ICD-10-CM

## 2025-03-25 DIAGNOSIS — H65.93 MIDDLE EAR EFFUSION, BILATERAL: Primary | ICD-10-CM

## 2025-03-25 DIAGNOSIS — H92.03 OTALGIA OF BOTH EARS: ICD-10-CM

## 2025-03-25 PROCEDURE — 99204 OFFICE O/P NEW MOD 45 MIN: CPT | Mod: S$GLB,,, | Performed by: NURSE PRACTITIONER

## 2025-03-25 RX ORDER — MECLIZINE HCL 12.5 MG 12.5 MG/1
12.5 TABLET ORAL 2 TIMES DAILY PRN
Qty: 20 TABLET | Refills: 0 | Status: SHIPPED | OUTPATIENT
Start: 2025-03-25 | End: 2025-04-04

## 2025-03-25 NOTE — PATIENT INSTRUCTIONS
Make an appointment to see your primary care provider within the next week    Continue Flonase 2 sprays to each nostril once daily    Discontinue Claritin    Go to the emergency room if dizziness gets worse as the dizziness is accompanied by weakness, headache or difficulty speaking

## 2025-03-25 NOTE — PROGRESS NOTES
"Subjective:      Patient ID: Josie Allen is a 79 y.o. female.    Vitals:  height is 5' 6" (1.676 m) and weight is 88.5 kg (195 lb). Her temperature is 97.9 °F (36.6 °C). Her blood pressure is 173/106 (abnormal) and her pulse is 81. Her respiration is 20 and oxygen saturation is 98%.     Chief Complaint: Ear Fullness    79-year-old female seen today with complaints of fluid in her ears, muffled hearing, and feeling dizzy for approximately 3 weeks.  Patient has been taking Claritin and Flonase without any improvement.  Patient states her symptoms began with a postnasal drip and a cough.  The cough has resolved in the postnasal drip has improved with the pressure in her ears has not.    Ear Fullness   There is pain in both ears. This is a recurrent problem. The current episode started 1 to 4 weeks ago (x's 3 weeks). The problem has been gradually worsening. Associated symptoms include hearing loss. Pertinent negatives include no abdominal pain, coughing, diarrhea, ear discharge, headaches, neck pain, rash, sore throat or vomiting. She has tried ear drops for the symptoms. The treatment provided no relief.       Constitution: Negative.   HENT:  Positive for ear pain, hearing loss and postnasal drip. Negative for ear discharge, tinnitus, congestion, sinus pain and sore throat.    Neck: Positive for neck stiffness. Negative for neck pain.   Cardiovascular:  Negative for chest pain and palpitations.   Eyes: Negative.    Respiratory:  Negative for cough, shortness of breath and wheezing.    Gastrointestinal:  Negative for abdominal pain, nausea, vomiting and diarrhea.   Endocrine: negative.   Genitourinary: Negative.    Musculoskeletal:  Negative for muscle ache.   Skin: Negative.  Negative for rash.   Allergic/Immunologic: Negative.  Positive for seasonal allergies.   Neurological:  Positive for dizziness. Negative for facial drooping, speech difficulty, loss of balance, headaches, disorientation and numbness. "   Hematologic/Lymphatic: Negative.    Psychiatric/Behavioral: Negative.  Negative for disorientation.       Objective:     Physical Exam   Constitutional: She is oriented to person, place, and time.  Non-toxic appearance. She does not appear ill. No distress. normal  HENT:   Head: Normocephalic and atraumatic.   Ears:   Right Ear: External ear and ear canal normal.   Left Ear: External ear and ear canal normal.      Comments: Clear fluid effusions to both TM's, right is worse than left  Nose: Nose normal. No rhinorrhea or congestion.   Mouth/Throat: Mucous membranes are moist. No oropharyngeal exudate or posterior oropharyngeal erythema. Oropharynx is clear.   Eyes: Conjunctivae are normal. Pupils are equal, round, and reactive to light. Extraocular movement intact   Neck: Neck supple. No neck rigidity present.   Cardiovascular: Normal rate, regular rhythm and normal heart sounds.   Pulmonary/Chest: Effort normal and breath sounds normal. No respiratory distress. She has no wheezes. She has no rhonchi. She has no rales.   Abdominal: Normal appearance and bowel sounds are normal. She exhibits no distension. Soft. There is no abdominal tenderness. There is no rebound and no guarding.   Musculoskeletal: Normal range of motion.         General: Normal range of motion.      Cervical back: She exhibits no tenderness.   Lymphadenopathy:     She has no cervical adenopathy.   Neurological: no focal deficit. She is alert, oriented to person, place, and time and at baseline. She displays no weakness and normal reflexes. No cranial nerve deficit or sensory deficit. Coordination and gait normal.   Skin: Skin is warm and dry. Capillary refill takes less than 2 seconds.   Psychiatric: Her behavior is normal. Mood, judgment and thought content normal.       Assessment:     1. Middle ear effusion, bilateral    2. Otalgia of both ears    3. Vertigo        Plan:       Middle ear effusion, bilateral  -     meclizine (ANTIVERT) 12.5 mg  tablet; Take 1 tablet (12.5 mg total) by mouth 2 (two) times daily as needed for Dizziness (ear fluid).  Dispense: 20 tablet; Refill: 0    Otalgia of both ears    Vertigo  -     meclizine (ANTIVERT) 12.5 mg tablet; Take 1 tablet (12.5 mg total) by mouth 2 (two) times daily as needed for Dizziness (ear fluid).  Dispense: 20 tablet; Refill: 0              Make an appointment to see your primary care provider within the next week    Continue Flonase 2 sprays to each nostril once daily    Discontinue Claritin    Go to the emergency room if dizziness gets worse as the dizziness is accompanied by weakness, headache or difficulty speaking

## 2025-05-30 ENCOUNTER — TELEPHONE (OUTPATIENT)
Dept: FAMILY MEDICINE | Facility: CLINIC | Age: 80
End: 2025-05-30
Payer: MEDICARE

## 2025-06-18 ENCOUNTER — OFFICE VISIT (OUTPATIENT)
Dept: UROGYNECOLOGY | Facility: CLINIC | Age: 80
End: 2025-06-18
Payer: MEDICARE

## 2025-06-18 DIAGNOSIS — N39.41 URGE INCONTINENCE OF URINE: ICD-10-CM

## 2025-06-18 DIAGNOSIS — Z46.89 PESSARY MAINTENANCE: ICD-10-CM

## 2025-06-18 DIAGNOSIS — N95.2 ATROPHIC VAGINITIS: ICD-10-CM

## 2025-06-18 DIAGNOSIS — R35.0 URINARY FREQUENCY: Primary | ICD-10-CM

## 2025-06-18 DIAGNOSIS — N81.11 CYSTOCELE, MIDLINE: ICD-10-CM

## 2025-06-18 DIAGNOSIS — N81.9 VAGINAL VAULT PROLAPSE: ICD-10-CM

## 2025-06-18 DIAGNOSIS — L30.9 DERMATITIS: ICD-10-CM

## 2025-06-18 PROCEDURE — 99212 OFFICE O/P EST SF 10 MIN: CPT | Mod: PBBFAC,PO | Performed by: NURSE PRACTITIONER

## 2025-06-18 PROCEDURE — 99999PBSHW POCT URINALYSIS(INSTRUMENT): Mod: PBBFAC,,,

## 2025-06-18 PROCEDURE — 81003 URINALYSIS AUTO W/O SCOPE: CPT | Mod: PBBFAC,PO | Performed by: NURSE PRACTITIONER

## 2025-06-18 PROCEDURE — 99999 PR PBB SHADOW E&M-EST. PATIENT-LVL II: CPT | Mod: PBBFAC,,, | Performed by: NURSE PRACTITIONER

## 2025-06-18 RX ORDER — CLOBETASOL PROPIONATE 0.5 MG/G
CREAM TOPICAL 2 TIMES DAILY
Qty: 45 G | Refills: 3 | Status: SHIPPED | OUTPATIENT
Start: 2025-06-18 | End: 2025-07-03

## 2025-06-18 RX ORDER — NYSTATIN 100000 [USP'U]/G
POWDER TOPICAL 3 TIMES DAILY
Qty: 60 G | Refills: 3 | Status: SHIPPED | OUTPATIENT
Start: 2025-06-18 | End: 2025-06-25

## 2025-06-18 NOTE — PROGRESS NOTES
Subjective:       Patient ID: Josie Allen is a 79 y.o. female.    Chief Complaint: Pessary Check (/)    HPI  Josie Allen is a 79 y.o. female.  Who presents today for routine pessary maintenance.  She was last seen in our office on 03/19/2025.  She feels that the pessary is doing well for her.  She denies any vaginal discharge bleeding or bulging around the pessary.  She is having some itching in the groin folds and underneath the breasts.  She does use nystatin powder off and on but does need a refill on this.  She is also having some mild itching the external vaginal area.  She is requesting a refill on her clobetasol.  She denies any other acute complaints/concerns at this time is ready to proceed with the pessary.    Review of Systems   Constitutional:  Negative for activity change, fever and unexpected weight change.   HENT:  Negative for hearing loss.    Eyes:  Negative for visual disturbance.   Respiratory:  Negative for shortness of breath and wheezing.    Cardiovascular:  Negative for chest pain, palpitations and leg swelling.   Gastrointestinal:  Negative for abdominal pain, constipation and diarrhea.   Genitourinary:  Positive for frequency. Negative for dyspareunia, dysuria, urgency, vaginal bleeding and vaginal discharge.   Musculoskeletal:  Negative for gait problem and neck pain.   Skin:  Negative for rash and wound.   Allergic/Immunologic: Negative for immunocompromised state.   Neurological:  Negative for tremors, speech difficulty and weakness.   Hematological:  Does not bruise/bleed easily.   Psychiatric/Behavioral:  Negative for agitation and confusion.        Objective:      Physical Exam  Vitals reviewed. Exam conducted with a chaperone present.   Constitutional:       General: She is not in acute distress.     Appearance: She is well-developed.   HENT:      Head: Normocephalic and atraumatic.   Neck:      Thyroid: No thyromegaly.   Pulmonary:      Effort: Pulmonary effort is  normal. No respiratory distress.   Abdominal:      Palpations: Abdomen is soft.      Tenderness: There is no abdominal tenderness.      Hernia: No hernia is present.   Musculoskeletal:         General: Normal range of motion.      Cervical back: Normal range of motion.   Skin:     General: Skin is warm and dry.      Findings: No rash.   Neurological:      Mental Status: She is alert and oriented to person, place, and time.   Psychiatric:         Mood and Affect: Mood normal.         Behavior: Behavior normal.         Thought Content: Thought content normal.       Pelvic Exam:  V: No lesions. No palpable nodes.   Va:  No discharge or bleeding.  Meatus:No caruncle or stenosis  Urethra: Non tender. No suburethral masses.  Cx/Cuff: Normal   Uterus:  Surgically absent  Ad: No mass or tenderness.  Levators :Symmetrical. Normal tone. Non tender.  BL: Non tender  RV: No hemorrhoids.        POP-Q Exam- pelvic organ prolapse quantitative    Aa 0  Anterior Wall Ba 0  Anterior wall C -2  Cervix or cuff   Gh   Genital hiatus pb   perineal body tvl 8  Total vaginal length   Ap -3  Posterior wall Bp -3  Posterior wall D   Posterior fornix           Assessment:       1. Urinary frequency    2. Urge incontinence of urine    3. Cystocele, midline    4. Vaginal vault prolapse    5. Pessary maintenance    6. Atrophic vaginitis    7. Dermatitis          Procedure note- #4 ring  pessary removed and cleaned with soap and water.  After betadine irrigation of the vagina, #4 ring pessary was reinserted into the vagina.  Pt ambulated in the room and denies any discomfort.  NP reminded pt that the first 24-48 hours are the most likely time for the pessary to fall out and to monitor the toilet before flushing.  Pt verbalized understanding.      Plan:       Urinary frequency monitor  -     POCT Urinalysis(Instrument)    Urge incontinence of urine monitor    Cystocele, midline continue with ring pessary    Vaginal vault prolapse continue with  pessary    Pessary maintenance continue with ring pessary    Atrophic vaginitis continue Estrace cream    Dermatitis  -     nystatin (NYSTOP) powder; Apply topically 3 (three) times daily. for 7 days  Dispense: 60 g; Refill: 3  -     clobetasoL (TEMOVATE) 0.05 % cream; Apply topically 2 (two) times daily. for 15 days  Dispense: 45 g; Refill: 3      RTC three-month    This note was primarily composed with dictation software.  Grammatical errors may exist.

## 2025-08-06 ENCOUNTER — LAB VISIT (OUTPATIENT)
Dept: LAB | Facility: HOSPITAL | Age: 80
End: 2025-08-06
Attending: STUDENT IN AN ORGANIZED HEALTH CARE EDUCATION/TRAINING PROGRAM
Payer: MEDICARE

## 2025-08-06 DIAGNOSIS — E78.5 HYPERLIPIDEMIA, UNSPECIFIED HYPERLIPIDEMIA TYPE: ICD-10-CM

## 2025-08-06 DIAGNOSIS — Z76.89 ENCOUNTER TO ESTABLISH CARE: ICD-10-CM

## 2025-08-06 DIAGNOSIS — E66.811 OBESITY, CLASS I, BMI 30-34.9: ICD-10-CM

## 2025-08-06 LAB
ABSOLUTE EOSINOPHIL (OHS): 0.47 K/UL
ABSOLUTE MONOCYTE (OHS): 0.49 K/UL (ref 0.3–1)
ABSOLUTE NEUTROPHIL COUNT (OHS): 2.89 K/UL (ref 1.8–7.7)
ALBUMIN SERPL BCP-MCNC: 3.8 G/DL (ref 3.5–5.2)
ALP SERPL-CCNC: 98 UNIT/L (ref 40–150)
ALT SERPL W/O P-5'-P-CCNC: 27 UNIT/L (ref 0–55)
ANION GAP (OHS): 8 MMOL/L (ref 8–16)
AST SERPL-CCNC: 37 UNIT/L (ref 0–50)
BASOPHILS # BLD AUTO: 0.05 K/UL
BASOPHILS NFR BLD AUTO: 0.9 %
BILIRUB SERPL-MCNC: 0.8 MG/DL (ref 0.1–1)
BUN SERPL-MCNC: 12 MG/DL (ref 8–23)
CALCIUM SERPL-MCNC: 9.7 MG/DL (ref 8.7–10.5)
CHLORIDE SERPL-SCNC: 104 MMOL/L (ref 95–110)
CHOLEST SERPL-MCNC: 265 MG/DL (ref 120–199)
CHOLEST/HDLC SERPL: 4.6 {RATIO} (ref 2–5)
CO2 SERPL-SCNC: 25 MMOL/L (ref 23–29)
CREAT SERPL-MCNC: 0.8 MG/DL (ref 0.5–1.4)
ERYTHROCYTE [DISTWIDTH] IN BLOOD BY AUTOMATED COUNT: 13.3 % (ref 11.5–14.5)
GFR SERPLBLD CREATININE-BSD FMLA CKD-EPI: >60 ML/MIN/1.73/M2
GLUCOSE SERPL-MCNC: 89 MG/DL (ref 70–110)
HCT VFR BLD AUTO: 44 % (ref 37–48.5)
HDLC SERPL-MCNC: 58 MG/DL (ref 40–75)
HDLC SERPL: 21.9 % (ref 20–50)
HGB BLD-MCNC: 14.4 GM/DL (ref 12–16)
IMM GRANULOCYTES # BLD AUTO: 0.03 K/UL (ref 0–0.04)
IMM GRANULOCYTES NFR BLD AUTO: 0.5 % (ref 0–0.5)
LDLC SERPL CALC-MCNC: 190.8 MG/DL (ref 63–159)
LYMPHOCYTES # BLD AUTO: 1.8 K/UL (ref 1–4.8)
MCH RBC QN AUTO: 30.7 PG (ref 27–31)
MCHC RBC AUTO-ENTMCNC: 32.7 G/DL (ref 32–36)
MCV RBC AUTO: 94 FL (ref 82–98)
NONHDLC SERPL-MCNC: 207 MG/DL
NUCLEATED RBC (/100WBC) (OHS): 0 /100 WBC
PLATELET # BLD AUTO: 138 K/UL (ref 150–450)
PMV BLD AUTO: 12.1 FL (ref 9.2–12.9)
POTASSIUM SERPL-SCNC: 4.6 MMOL/L (ref 3.5–5.1)
PROT SERPL-MCNC: 6.9 GM/DL (ref 6–8.4)
RBC # BLD AUTO: 4.69 M/UL (ref 4–5.4)
RELATIVE EOSINOPHIL (OHS): 8.2 %
RELATIVE LYMPHOCYTE (OHS): 31.4 % (ref 18–48)
RELATIVE MONOCYTE (OHS): 8.6 % (ref 4–15)
RELATIVE NEUTROPHIL (OHS): 50.4 % (ref 38–73)
SODIUM SERPL-SCNC: 137 MMOL/L (ref 136–145)
TRIGL SERPL-MCNC: 81 MG/DL (ref 30–150)
WBC # BLD AUTO: 5.73 K/UL (ref 3.9–12.7)

## 2025-08-06 PROCEDURE — 85025 COMPLETE CBC W/AUTO DIFF WBC: CPT

## 2025-08-06 PROCEDURE — 36415 COLL VENOUS BLD VENIPUNCTURE: CPT | Mod: PO

## 2025-08-06 PROCEDURE — 80061 LIPID PANEL: CPT

## 2025-08-06 PROCEDURE — 80053 COMPREHEN METABOLIC PANEL: CPT

## 2025-08-26 ENCOUNTER — OFFICE VISIT (OUTPATIENT)
Dept: FAMILY MEDICINE | Facility: CLINIC | Age: 80
End: 2025-08-26
Payer: MEDICARE

## 2025-08-26 VITALS
HEIGHT: 66 IN | SYSTOLIC BLOOD PRESSURE: 120 MMHG | OXYGEN SATURATION: 100 % | BODY MASS INDEX: 32.13 KG/M2 | HEART RATE: 75 BPM | WEIGHT: 199.94 LBS | DIASTOLIC BLOOD PRESSURE: 80 MMHG

## 2025-08-26 DIAGNOSIS — Z13.1 SCREENING FOR DIABETES MELLITUS (DM): ICD-10-CM

## 2025-08-26 DIAGNOSIS — D69.6 THROMBOCYTOPENIA: ICD-10-CM

## 2025-08-26 DIAGNOSIS — R42 VERTIGO: ICD-10-CM

## 2025-08-26 DIAGNOSIS — F41.9 ANXIETY: Primary | ICD-10-CM

## 2025-08-26 DIAGNOSIS — J30.89 ENVIRONMENTAL AND SEASONAL ALLERGIES: ICD-10-CM

## 2025-08-26 DIAGNOSIS — E78.2 MIXED HYPERLIPIDEMIA: ICD-10-CM

## 2025-08-26 DIAGNOSIS — F33.1 MODERATE EPISODE OF RECURRENT MAJOR DEPRESSIVE DISORDER: ICD-10-CM

## 2025-08-26 DIAGNOSIS — Z78.0 ASYMPTOMATIC MENOPAUSAL STATE: ICD-10-CM

## 2025-08-26 PROCEDURE — 99214 OFFICE O/P EST MOD 30 MIN: CPT | Mod: PBBFAC,PO | Performed by: STUDENT IN AN ORGANIZED HEALTH CARE EDUCATION/TRAINING PROGRAM

## 2025-08-26 PROCEDURE — 99999 PR PBB SHADOW E&M-EST. PATIENT-LVL IV: CPT | Mod: PBBFAC,,, | Performed by: STUDENT IN AN ORGANIZED HEALTH CARE EDUCATION/TRAINING PROGRAM

## 2025-08-26 RX ORDER — FLUTICASONE PROPIONATE 50 MCG
2 SPRAY, SUSPENSION (ML) NASAL DAILY
Qty: 18.2 ML | Refills: 5 | Status: SHIPPED | OUTPATIENT
Start: 2025-08-26

## 2025-08-26 RX ORDER — MECLIZINE HCL 12.5 MG 12.5 MG/1
12.5 TABLET ORAL 2 TIMES DAILY PRN
Qty: 20 TABLET | Refills: 0 | Status: SHIPPED | OUTPATIENT
Start: 2025-08-26 | End: 2025-09-05

## 2025-08-26 RX ORDER — EZETIMIBE 10 MG/1
10 TABLET ORAL DAILY
Qty: 90 TABLET | Refills: 3 | Status: SHIPPED | OUTPATIENT
Start: 2025-08-26 | End: 2026-08-26

## 2025-08-26 RX ORDER — ESCITALOPRAM OXALATE 5 MG/1
7.5 TABLET ORAL DAILY
Qty: 180 TABLET | Refills: 3 | Status: CANCELLED | OUTPATIENT
Start: 2025-08-26 | End: 2026-08-26

## 2025-08-26 RX ORDER — ESCITALOPRAM OXALATE 5 MG/1
TABLET ORAL
Qty: 180 TABLET | Refills: 3 | Status: SHIPPED | OUTPATIENT
Start: 2025-08-26

## 2025-08-26 RX ORDER — LORATADINE 10 MG/1
10 TABLET ORAL DAILY
Qty: 90 TABLET | Refills: 3 | Status: SHIPPED | OUTPATIENT
Start: 2025-08-26 | End: 2026-08-26

## 2025-09-03 ENCOUNTER — HOSPITAL ENCOUNTER (OUTPATIENT)
Dept: RADIOLOGY | Facility: CLINIC | Age: 80
Discharge: HOME | End: 2025-09-03
Attending: STUDENT IN AN ORGANIZED HEALTH CARE EDUCATION/TRAINING PROGRAM
Payer: MEDICARE

## 2025-09-03 DIAGNOSIS — Z78.0 ASYMPTOMATIC MENOPAUSAL STATE: ICD-10-CM

## 2025-09-03 PROCEDURE — 77080 DXA BONE DENSITY AXIAL: CPT | Mod: TC,PO

## 2025-09-03 PROCEDURE — 77080 DXA BONE DENSITY AXIAL: CPT | Mod: 26,,, | Performed by: STUDENT IN AN ORGANIZED HEALTH CARE EDUCATION/TRAINING PROGRAM
